# Patient Record
Sex: FEMALE | Race: WHITE | NOT HISPANIC OR LATINO | Employment: OTHER | URBAN - NONMETROPOLITAN AREA
[De-identification: names, ages, dates, MRNs, and addresses within clinical notes are randomized per-mention and may not be internally consistent; named-entity substitution may affect disease eponyms.]

---

## 2017-01-13 ENCOUNTER — HOSPITAL ENCOUNTER (EMERGENCY)
Facility: HOSPITAL | Age: 62
End: 2017-01-13
Attending: EMERGENCY MEDICINE
Payer: COMMERCIAL

## 2017-01-13 ENCOUNTER — APPOINTMENT (EMERGENCY)
Dept: RADIOLOGY | Facility: HOSPITAL | Age: 62
End: 2017-01-13
Payer: COMMERCIAL

## 2017-01-13 ENCOUNTER — APPOINTMENT (EMERGENCY)
Dept: RADIOLOGY | Facility: HOSPITAL | Age: 62
DRG: 956 | End: 2017-01-13
Payer: COMMERCIAL

## 2017-01-13 ENCOUNTER — HOSPITAL ENCOUNTER (INPATIENT)
Facility: HOSPITAL | Age: 62
LOS: 6 days | DRG: 956 | End: 2017-01-20
Attending: SURGERY | Admitting: SURGERY
Payer: COMMERCIAL

## 2017-01-13 VITALS
BODY MASS INDEX: 22.24 KG/M2 | SYSTOLIC BLOOD PRESSURE: 112 MMHG | HEART RATE: 95 BPM | OXYGEN SATURATION: 95 % | WEIGHT: 142 LBS | DIASTOLIC BLOOD PRESSURE: 62 MMHG | RESPIRATION RATE: 18 BRPM | TEMPERATURE: 97.5 F

## 2017-01-13 DIAGNOSIS — S72.142A: Primary | ICD-10-CM
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
## 2017-01-13 DIAGNOSIS — S72.142A INTERTROCHANTERIC FRACTURE OF LEFT FEMUR (HCC): Primary | ICD-10-CM

## 2017-01-13 LAB
ABO GROUP BLD: NORMAL
ABO GROUP BLD: NORMAL
ANION GAP SERPL CALCULATED.3IONS-SCNC: 14 MMOL/L (ref 4–13)
BASOPHILS # BLD AUTO: 0.02 THOUSANDS/ΜL (ref 0–0.1)
BASOPHILS NFR BLD AUTO: 0 % (ref 0–1)
BLD GP AB SCN SERPL QL: NEGATIVE
BLD GP AB SCN SERPL QL: NEGATIVE
BUN SERPL-MCNC: 8 MG/DL (ref 5–25)
CALCIUM SERPL-MCNC: 9.2 MG/DL (ref 8.3–10.1)
CHLORIDE SERPL-SCNC: 102 MMOL/L (ref 100–108)
CO2 SERPL-SCNC: 26 MMOL/L (ref 21–32)
CREAT SERPL-MCNC: 0.68 MG/DL (ref 0.6–1.3)
EOSINOPHIL # BLD AUTO: 0.01 THOUSAND/ΜL (ref 0–0.61)
EOSINOPHIL NFR BLD AUTO: 0 % (ref 0–6)
ERYTHROCYTE [DISTWIDTH] IN BLOOD BY AUTOMATED COUNT: 16.4 % (ref 11.6–15.1)
GFR SERPL CREATININE-BSD FRML MDRD: >60 ML/MIN/1.73SQ M
GLUCOSE SERPL-MCNC: 103 MG/DL (ref 65–140)
HCT VFR BLD AUTO: 46.4 % (ref 34.8–46.1)
HGB BLD-MCNC: 15.1 G/DL (ref 11.5–15.4)
INR PPP: 3.47 (ref 0.86–1.16)
LYMPHOCYTES # BLD AUTO: 1.92 THOUSANDS/ΜL (ref 0.6–4.47)
LYMPHOCYTES NFR BLD AUTO: 20 % (ref 14–44)
MCH RBC QN AUTO: 27.3 PG (ref 26.8–34.3)
MCHC RBC AUTO-ENTMCNC: 32.5 G/DL (ref 31.4–37.4)
MCV RBC AUTO: 84 FL (ref 82–98)
MONOCYTES # BLD AUTO: 0.5 THOUSAND/ΜL (ref 0.17–1.22)
MONOCYTES NFR BLD AUTO: 5 % (ref 4–12)
NEUTROPHILS # BLD AUTO: 7.08 THOUSANDS/ΜL (ref 1.85–7.62)
NEUTS SEG NFR BLD AUTO: 75 % (ref 43–75)
PLATELET # BLD AUTO: 297 THOUSANDS/UL (ref 149–390)
PMV BLD AUTO: 10.6 FL (ref 8.9–12.7)
POTASSIUM SERPL-SCNC: 3.5 MMOL/L (ref 3.5–5.3)
PROTHROMBIN TIME: 33.1 SECONDS (ref 12–14.3)
RBC # BLD AUTO: 5.53 MILLION/UL (ref 3.81–5.12)
RH BLD: POSITIVE
RH BLD: POSITIVE
SODIUM SERPL-SCNC: 142 MMOL/L (ref 136–145)
WBC # BLD AUTO: 9.53 THOUSAND/UL (ref 4.31–10.16)

## 2017-01-13 PROCEDURE — P9017 PLASMA 1 DONOR FRZ W/IN 8 HR: HCPCS

## 2017-01-13 PROCEDURE — 85025 COMPLETE CBC W/AUTO DIFF WBC: CPT | Performed by: EMERGENCY MEDICINE

## 2017-01-13 PROCEDURE — 86901 BLOOD TYPING SEROLOGIC RH(D): CPT | Performed by: ORTHOPAEDIC SURGERY

## 2017-01-13 PROCEDURE — 71010 HB CHEST X-RAY 1 VIEW FRONTAL: CPT

## 2017-01-13 PROCEDURE — 86923 COMPATIBILITY TEST ELECTRIC: CPT

## 2017-01-13 PROCEDURE — 86900 BLOOD TYPING SEROLOGIC ABO: CPT | Performed by: EMERGENCY MEDICINE

## 2017-01-13 PROCEDURE — 86850 RBC ANTIBODY SCREEN: CPT | Performed by: EMERGENCY MEDICINE

## 2017-01-13 PROCEDURE — 86850 RBC ANTIBODY SCREEN: CPT | Performed by: ORTHOPAEDIC SURGERY

## 2017-01-13 PROCEDURE — 99285 EMERGENCY DEPT VISIT HI MDM: CPT

## 2017-01-13 PROCEDURE — 85610 PROTHROMBIN TIME: CPT | Performed by: EMERGENCY MEDICINE

## 2017-01-13 PROCEDURE — 86927 PLASMA FRESH FROZEN: CPT

## 2017-01-13 PROCEDURE — 80048 BASIC METABOLIC PNL TOTAL CA: CPT | Performed by: EMERGENCY MEDICINE

## 2017-01-13 PROCEDURE — 73502 X-RAY EXAM HIP UNI 2-3 VIEWS: CPT

## 2017-01-13 PROCEDURE — 86900 BLOOD TYPING SEROLOGIC ABO: CPT | Performed by: ORTHOPAEDIC SURGERY

## 2017-01-13 PROCEDURE — 74177 CT ABD & PELVIS W/CONTRAST: CPT

## 2017-01-13 PROCEDURE — 72125 CT NECK SPINE W/O DYE: CPT

## 2017-01-13 PROCEDURE — 36415 COLL VENOUS BLD VENIPUNCTURE: CPT | Performed by: EMERGENCY MEDICINE

## 2017-01-13 PROCEDURE — 70450 CT HEAD/BRAIN W/O DYE: CPT

## 2017-01-13 PROCEDURE — 71260 CT THORAX DX C+: CPT

## 2017-01-13 PROCEDURE — 30233K1 TRANSFUSION OF NONAUTOLOGOUS FROZEN PLASMA INTO PERIPHERAL VEIN, PERCUTANEOUS APPROACH: ICD-10-PCS | Performed by: SURGERY

## 2017-01-13 PROCEDURE — 96374 THER/PROPH/DIAG INJ IV PUSH: CPT

## 2017-01-13 PROCEDURE — 73552 X-RAY EXAM OF FEMUR 2/>: CPT

## 2017-01-13 PROCEDURE — 86901 BLOOD TYPING SEROLOGIC RH(D): CPT | Performed by: EMERGENCY MEDICINE

## 2017-01-13 RX ORDER — OXYCODONE HYDROCHLORIDE 5 MG/1
5 TABLET ORAL EVERY 4 HOURS PRN
Status: DISCONTINUED | OUTPATIENT
Start: 2017-01-13 | End: 2017-01-20 | Stop reason: HOSPADM

## 2017-01-13 RX ORDER — MORPHINE SULFATE 2 MG/ML
2 INJECTION, SOLUTION INTRAMUSCULAR; INTRAVENOUS EVERY 4 HOURS PRN
Status: DISCONTINUED | OUTPATIENT
Start: 2017-01-13 | End: 2017-01-19

## 2017-01-13 RX ORDER — SENNOSIDES 8.6 MG
1 TABLET ORAL DAILY
Status: DISCONTINUED | OUTPATIENT
Start: 2017-01-14 | End: 2017-01-20 | Stop reason: HOSPADM

## 2017-01-13 RX ORDER — ALPRAZOLAM 0.25 MG/1
0.25 TABLET ORAL
Status: ON HOLD | COMMUNITY
End: 2017-01-31 | Stop reason: CLARIF

## 2017-01-13 RX ORDER — ATORVASTATIN CALCIUM 10 MG/1
10 TABLET, FILM COATED ORAL
COMMUNITY
End: 2017-01-31 | Stop reason: HOSPADM

## 2017-01-13 RX ORDER — MORPHINE SULFATE 10 MG/ML
6 INJECTION, SOLUTION INTRAMUSCULAR; INTRAVENOUS ONCE
Status: COMPLETED | OUTPATIENT
Start: 2017-01-13 | End: 2017-01-13

## 2017-01-13 RX ORDER — WARFARIN SODIUM 2.5 MG/1
2.5 TABLET ORAL
Status: ON HOLD | COMMUNITY
End: 2017-01-31

## 2017-01-13 RX ORDER — PHYTONADIONE 5 MG/1
10 TABLET ORAL ONCE
Status: COMPLETED | OUTPATIENT
Start: 2017-01-13 | End: 2017-01-13

## 2017-01-13 RX ORDER — DOCUSATE SODIUM 100 MG/1
100 CAPSULE, LIQUID FILLED ORAL 2 TIMES DAILY
Status: DISCONTINUED | OUTPATIENT
Start: 2017-01-13 | End: 2017-01-20 | Stop reason: HOSPADM

## 2017-01-13 RX ORDER — OXYCODONE AND ACETAMINOPHEN 10; 325 MG/1; MG/1
1 TABLET ORAL EVERY 4 HOURS PRN
Status: ON HOLD | COMMUNITY
End: 2017-01-20 | Stop reason: CLARIF

## 2017-01-13 RX ORDER — SODIUM CHLORIDE, SODIUM LACTATE, POTASSIUM CHLORIDE, CALCIUM CHLORIDE 600; 310; 30; 20 MG/100ML; MG/100ML; MG/100ML; MG/100ML
75 INJECTION, SOLUTION INTRAVENOUS CONTINUOUS
Status: DISCONTINUED | OUTPATIENT
Start: 2017-01-14 | End: 2017-01-15

## 2017-01-13 RX ORDER — OXYCODONE HYDROCHLORIDE 5 MG/1
10 TABLET ORAL EVERY 4 HOURS PRN
Status: DISCONTINUED | OUTPATIENT
Start: 2017-01-13 | End: 2017-01-20 | Stop reason: HOSPADM

## 2017-01-13 RX ORDER — SODIUM CHLORIDE 9 MG/ML
125 INJECTION, SOLUTION INTRAVENOUS CONTINUOUS
Status: DISCONTINUED | OUTPATIENT
Start: 2017-01-13 | End: 2017-01-13

## 2017-01-13 RX ORDER — ESCITALOPRAM OXALATE 10 MG/1
10 TABLET ORAL DAILY
COMMUNITY
End: 2017-01-31 | Stop reason: HOSPADM

## 2017-01-13 RX ORDER — ONDANSETRON 2 MG/ML
4 INJECTION INTRAMUSCULAR; INTRAVENOUS EVERY 6 HOURS PRN
Status: DISCONTINUED | OUTPATIENT
Start: 2017-01-13 | End: 2017-01-20 | Stop reason: HOSPADM

## 2017-01-13 RX ORDER — POTASSIUM CHLORIDE 20 MEQ/1
20 TABLET, EXTENDED RELEASE ORAL DAILY
Status: ON HOLD | COMMUNITY
End: 2017-01-31

## 2017-01-13 RX ADMIN — IOHEXOL 100 ML: 350 INJECTION, SOLUTION INTRAVENOUS at 21:05

## 2017-01-13 RX ADMIN — MORPHINE SULFATE 6 MG: 10 INJECTION, SOLUTION INTRAMUSCULAR; INTRAVENOUS at 17:33

## 2017-01-13 RX ADMIN — PHYTONADIONE 10 MG: 5 TABLET ORAL at 22:34

## 2017-01-13 RX ADMIN — DOCUSATE SODIUM 100 MG: 100 CAPSULE, LIQUID FILLED ORAL at 21:57

## 2017-01-13 RX ADMIN — OXYCODONE HYDROCHLORIDE 10 MG: 5 TABLET ORAL at 21:57

## 2017-01-14 ENCOUNTER — ANESTHESIA EVENT (OUTPATIENT)
Dept: PERIOP | Facility: HOSPITAL | Age: 62
DRG: 956 | End: 2017-01-14
Payer: COMMERCIAL

## 2017-01-14 ENCOUNTER — ANESTHESIA (OUTPATIENT)
Dept: PERIOP | Facility: HOSPITAL | Age: 62
DRG: 956 | End: 2017-01-14
Payer: COMMERCIAL

## 2017-01-14 ENCOUNTER — APPOINTMENT (OUTPATIENT)
Dept: RADIOLOGY | Facility: HOSPITAL | Age: 62
DRG: 956 | End: 2017-01-14
Payer: COMMERCIAL

## 2017-01-14 PROBLEM — Z04.1 ENCOUNTER FOR EXAMINATION FOLLOWING MOTOR VEHICLE COLLISION (MVC): Status: ACTIVE | Noted: 2017-01-14

## 2017-01-14 PROBLEM — S27.321A LEFT PULMONARY CONTUSION: Status: ACTIVE | Noted: 2017-01-14

## 2017-01-14 PROBLEM — R47.01 EXPRESSIVE APHASIA: Status: ACTIVE | Noted: 2017-01-14

## 2017-01-14 PROBLEM — S72.142A INTERTROCHANTERIC FRACTURE OF LEFT FEMUR (HCC): Status: ACTIVE | Noted: 2017-01-14

## 2017-01-14 PROBLEM — R79.1 ELEVATED INTERNATIONAL NORMALIZED RATIO (INR): Status: ACTIVE | Noted: 2017-01-14

## 2017-01-14 PROBLEM — T14.90XA INJURY: Status: RESOLVED | Noted: 2017-01-14 | Resolved: 2017-01-14

## 2017-01-14 PROBLEM — Z86.73 H/O ISCHEMIC LEFT MCA STROKE: Status: ACTIVE | Noted: 2017-01-14

## 2017-01-14 PROBLEM — T14.90XA INJURY: Status: ACTIVE | Noted: 2017-01-14

## 2017-01-14 LAB
ABO GROUP BLD BPU: NORMAL
ABO GROUP BLD BPU: NORMAL
ANION GAP SERPL CALCULATED.3IONS-SCNC: 12 MMOL/L (ref 4–13)
BASOPHILS # BLD AUTO: 0.02 THOUSANDS/ΜL (ref 0–0.1)
BASOPHILS NFR BLD AUTO: 0 % (ref 0–1)
BPU ID: NORMAL
BPU ID: NORMAL
BUN SERPL-MCNC: 8 MG/DL (ref 5–25)
CALCIUM SERPL-MCNC: 8.9 MG/DL (ref 8.3–10.1)
CHLORIDE SERPL-SCNC: 102 MMOL/L (ref 100–108)
CO2 SERPL-SCNC: 24 MMOL/L (ref 21–32)
CREAT SERPL-MCNC: 0.55 MG/DL (ref 0.6–1.3)
EOSINOPHIL # BLD AUTO: 0 THOUSAND/ΜL (ref 0–0.61)
EOSINOPHIL NFR BLD AUTO: 0 % (ref 0–6)
ERYTHROCYTE [DISTWIDTH] IN BLOOD BY AUTOMATED COUNT: 16.6 % (ref 11.6–15.1)
ERYTHROCYTE [DISTWIDTH] IN BLOOD BY AUTOMATED COUNT: 16.6 % (ref 11.6–15.1)
GFR SERPL CREATININE-BSD FRML MDRD: >60 ML/MIN/1.73SQ M
GLUCOSE SERPL-MCNC: 96 MG/DL (ref 65–140)
HCT VFR BLD AUTO: 30.4 % (ref 34.8–46.1)
HCT VFR BLD AUTO: 35.9 % (ref 34.8–46.1)
HGB BLD-MCNC: 10 G/DL (ref 11.5–15.4)
HGB BLD-MCNC: 12 G/DL (ref 11.5–15.4)
INR PPP: 1.52 (ref 0.86–1.16)
LYMPHOCYTES # BLD AUTO: 2 THOUSANDS/ΜL (ref 0.6–4.47)
LYMPHOCYTES NFR BLD AUTO: 19 % (ref 14–44)
MCH RBC QN AUTO: 27.6 PG (ref 26.8–34.3)
MCH RBC QN AUTO: 27.7 PG (ref 26.8–34.3)
MCHC RBC AUTO-ENTMCNC: 32.9 G/DL (ref 31.4–37.4)
MCHC RBC AUTO-ENTMCNC: 33.4 G/DL (ref 31.4–37.4)
MCV RBC AUTO: 83 FL (ref 82–98)
MCV RBC AUTO: 84 FL (ref 82–98)
MONOCYTES # BLD AUTO: 0.68 THOUSAND/ΜL (ref 0.17–1.22)
MONOCYTES NFR BLD AUTO: 7 % (ref 4–12)
NEUTROPHILS # BLD AUTO: 7.66 THOUSANDS/ΜL (ref 1.85–7.62)
NEUTS SEG NFR BLD AUTO: 74 % (ref 43–75)
NRBC BLD AUTO-RTO: 0 /100 WBCS
PLATELET # BLD AUTO: 256 THOUSANDS/UL (ref 149–390)
PLATELET # BLD AUTO: 258 THOUSANDS/UL (ref 149–390)
PMV BLD AUTO: 10 FL (ref 8.9–12.7)
PMV BLD AUTO: 10.2 FL (ref 8.9–12.7)
POTASSIUM SERPL-SCNC: 3.4 MMOL/L (ref 3.5–5.3)
PROTHROMBIN TIME: 18.3 SECONDS (ref 12–14.3)
RBC # BLD AUTO: 3.62 MILLION/UL (ref 3.81–5.12)
RBC # BLD AUTO: 4.33 MILLION/UL (ref 3.81–5.12)
SODIUM SERPL-SCNC: 138 MMOL/L (ref 136–145)
UNIT DISPENSE STATUS: NORMAL
UNIT DISPENSE STATUS: NORMAL
UNIT PRODUCT CODE: NORMAL
UNIT PRODUCT CODE: NORMAL
UNIT RH: NORMAL
UNIT RH: NORMAL
WBC # BLD AUTO: 10.38 THOUSAND/UL (ref 4.31–10.16)
WBC # BLD AUTO: 8.84 THOUSAND/UL (ref 4.31–10.16)

## 2017-01-14 PROCEDURE — C1769 GUIDE WIRE: HCPCS | Performed by: ORTHOPAEDIC SURGERY

## 2017-01-14 PROCEDURE — C1713 ANCHOR/SCREW BN/BN,TIS/BN: HCPCS | Performed by: ORTHOPAEDIC SURGERY

## 2017-01-14 PROCEDURE — 80048 BASIC METABOLIC PNL TOTAL CA: CPT | Performed by: EMERGENCY MEDICINE

## 2017-01-14 PROCEDURE — P9017 PLASMA 1 DONOR FRZ W/IN 8 HR: HCPCS

## 2017-01-14 PROCEDURE — 73502 X-RAY EXAM HIP UNI 2-3 VIEWS: CPT

## 2017-01-14 PROCEDURE — 85610 PROTHROMBIN TIME: CPT | Performed by: ORTHOPAEDIC SURGERY

## 2017-01-14 PROCEDURE — 85025 COMPLETE CBC W/AUTO DIFF WBC: CPT | Performed by: NURSE PRACTITIONER

## 2017-01-14 PROCEDURE — 86927 PLASMA FRESH FROZEN: CPT

## 2017-01-14 PROCEDURE — 85027 COMPLETE CBC AUTOMATED: CPT | Performed by: ORTHOPAEDIC SURGERY

## 2017-01-14 PROCEDURE — 0QH736Z INSERTION OF INTRAMEDULLARY INTERNAL FIXATION DEVICE INTO LEFT UPPER FEMUR, PERCUTANEOUS APPROACH: ICD-10-PCS | Performed by: ORTHOPAEDIC SURGERY

## 2017-01-14 DEVICE — 11.0MM TI HELICAL BLADE 105MM-STERILE: Type: IMPLANTABLE DEVICE | Status: FUNCTIONAL

## 2017-01-14 DEVICE — 10MM/130 DEG TI CANN TROCH FIXATION NAIL 170MM-STERILE: Type: IMPLANTABLE DEVICE | Status: FUNCTIONAL

## 2017-01-14 DEVICE — 5.0MM TI LOCKING SCREW W/T25 STARDRIVE 40MM F/IM NAIL-STER: Type: IMPLANTABLE DEVICE | Status: FUNCTIONAL

## 2017-01-14 RX ORDER — ONDANSETRON 2 MG/ML
INJECTION INTRAMUSCULAR; INTRAVENOUS AS NEEDED
Status: DISCONTINUED | OUTPATIENT
Start: 2017-01-14 | End: 2017-01-14 | Stop reason: SURG

## 2017-01-14 RX ORDER — GLYCOPYRROLATE 0.2 MG/ML
INJECTION INTRAMUSCULAR; INTRAVENOUS AS NEEDED
Status: DISCONTINUED | OUTPATIENT
Start: 2017-01-14 | End: 2017-01-14 | Stop reason: SURG

## 2017-01-14 RX ORDER — FENTANYL CITRATE 50 UG/ML
INJECTION, SOLUTION INTRAMUSCULAR; INTRAVENOUS AS NEEDED
Status: DISCONTINUED | OUTPATIENT
Start: 2017-01-14 | End: 2017-01-14 | Stop reason: SURG

## 2017-01-14 RX ORDER — SODIUM CHLORIDE, SODIUM LACTATE, POTASSIUM CHLORIDE, CALCIUM CHLORIDE 600; 310; 30; 20 MG/100ML; MG/100ML; MG/100ML; MG/100ML
75 INJECTION, SOLUTION INTRAVENOUS ONCE
Status: DISCONTINUED | OUTPATIENT
Start: 2017-01-14 | End: 2017-01-14 | Stop reason: SDUPTHER

## 2017-01-14 RX ORDER — ESMOLOL HYDROCHLORIDE 10 MG/ML
INJECTION INTRAVENOUS AS NEEDED
Status: DISCONTINUED | OUTPATIENT
Start: 2017-01-14 | End: 2017-01-14 | Stop reason: SURG

## 2017-01-14 RX ORDER — SODIUM CHLORIDE 9 MG/ML
INJECTION, SOLUTION INTRAVENOUS CONTINUOUS PRN
Status: DISCONTINUED | OUTPATIENT
Start: 2017-01-14 | End: 2017-01-14 | Stop reason: SURG

## 2017-01-14 RX ORDER — FENTANYL CITRATE/PF 50 MCG/ML
25 SYRINGE (ML) INJECTION
Status: DISCONTINUED | OUTPATIENT
Start: 2017-01-14 | End: 2017-01-14 | Stop reason: HOSPADM

## 2017-01-14 RX ORDER — OXYCODONE HYDROCHLORIDE 5 MG/1
5 TABLET ORAL EVERY 4 HOURS PRN
Qty: 30 TABLET | Refills: 0 | Status: ON HOLD | OUTPATIENT
Start: 2017-01-14 | End: 2017-01-20 | Stop reason: CLARIF

## 2017-01-14 RX ORDER — POTASSIUM CHLORIDE 14.9 MG/ML
20 INJECTION INTRAVENOUS ONCE
Status: COMPLETED | OUTPATIENT
Start: 2017-01-14 | End: 2017-01-14

## 2017-01-14 RX ADMIN — ESMOLOL HYDROCHLORIDE 10 MG: 100 INJECTION, SOLUTION INTRAVENOUS at 10:31

## 2017-01-14 RX ADMIN — ONDANSETRON 4 MG: 2 INJECTION INTRAMUSCULAR; INTRAVENOUS at 11:35

## 2017-01-14 RX ADMIN — SODIUM CHLORIDE: 0.9 INJECTION, SOLUTION INTRAVENOUS at 10:09

## 2017-01-14 RX ADMIN — SODIUM CHLORIDE, SODIUM LACTATE, POTASSIUM CHLORIDE, AND CALCIUM CHLORIDE 75 ML/HR: .6; .31; .03; .02 INJECTION, SOLUTION INTRAVENOUS at 02:56

## 2017-01-14 RX ADMIN — SODIUM CHLORIDE, SODIUM LACTATE, POTASSIUM CHLORIDE, AND CALCIUM CHLORIDE 75 ML/HR: .6; .31; .03; .02 INJECTION, SOLUTION INTRAVENOUS at 17:23

## 2017-01-14 RX ADMIN — CEFAZOLIN SODIUM 2000 MG: 2 SOLUTION INTRAVENOUS at 17:23

## 2017-01-14 RX ADMIN — GLYCOPYRROLATE 0.8 MG: 0.2 INJECTION INTRAMUSCULAR; INTRAVENOUS at 11:38

## 2017-01-14 RX ADMIN — FENTANYL CITRATE 25 MCG: 50 INJECTION, SOLUTION INTRAMUSCULAR; INTRAVENOUS at 10:52

## 2017-01-14 RX ADMIN — POTASSIUM CHLORIDE 20 MEQ: 200 INJECTION, SOLUTION INTRAVENOUS at 08:14

## 2017-01-14 RX ADMIN — SODIUM CHLORIDE, SODIUM LACTATE, POTASSIUM CHLORIDE, AND CALCIUM CHLORIDE: .6; .31; .03; .02 INJECTION, SOLUTION INTRAVENOUS at 10:09

## 2017-01-14 RX ADMIN — CEFAZOLIN SODIUM 2000 MG: 2 SOLUTION INTRAVENOUS at 10:31

## 2017-01-14 RX ADMIN — METOPROLOL TARTRATE 1 MG: 5 INJECTION, SOLUTION INTRAVENOUS at 10:31

## 2017-01-14 RX ADMIN — OXYCODONE HYDROCHLORIDE 5 MG: 5 TABLET ORAL at 21:11

## 2017-01-14 RX ADMIN — MORPHINE SULFATE 2 MG: 2 INJECTION, SOLUTION INTRAMUSCULAR; INTRAVENOUS at 05:29

## 2017-01-14 RX ADMIN — OXYCODONE HYDROCHLORIDE 10 MG: 5 TABLET ORAL at 04:35

## 2017-01-14 RX ADMIN — NEOSTIGMINE METHYLSULFATE 4 MG: 1 INJECTION INTRAMUSCULAR; INTRAVENOUS; SUBCUTANEOUS at 11:38

## 2017-01-15 LAB
ANION GAP SERPL CALCULATED.3IONS-SCNC: 8 MMOL/L (ref 4–13)
ANION GAP SERPL CALCULATED.3IONS-SCNC: 9 MMOL/L (ref 4–13)
BASOPHILS # BLD AUTO: 0.04 THOUSANDS/ΜL (ref 0–0.1)
BASOPHILS NFR BLD AUTO: 0 % (ref 0–1)
BUN SERPL-MCNC: 7 MG/DL (ref 5–25)
BUN SERPL-MCNC: 8 MG/DL (ref 5–25)
CALCIUM SERPL-MCNC: 7.8 MG/DL (ref 8.3–10.1)
CALCIUM SERPL-MCNC: 8.1 MG/DL (ref 8.3–10.1)
CHLORIDE SERPL-SCNC: 102 MMOL/L (ref 100–108)
CHLORIDE SERPL-SCNC: 99 MMOL/L (ref 100–108)
CO2 SERPL-SCNC: 28 MMOL/L (ref 21–32)
CO2 SERPL-SCNC: 30 MMOL/L (ref 21–32)
CREAT SERPL-MCNC: 0.6 MG/DL (ref 0.6–1.3)
CREAT SERPL-MCNC: 0.61 MG/DL (ref 0.6–1.3)
EOSINOPHIL # BLD AUTO: 0.04 THOUSAND/ΜL (ref 0–0.61)
EOSINOPHIL NFR BLD AUTO: 0 % (ref 0–6)
ERYTHROCYTE [DISTWIDTH] IN BLOOD BY AUTOMATED COUNT: 16.3 % (ref 11.6–15.1)
ERYTHROCYTE [DISTWIDTH] IN BLOOD BY AUTOMATED COUNT: 16.5 % (ref 11.6–15.1)
GFR SERPL CREATININE-BSD FRML MDRD: >60 ML/MIN/1.73SQ M
GFR SERPL CREATININE-BSD FRML MDRD: >60 ML/MIN/1.73SQ M
GLUCOSE SERPL-MCNC: 115 MG/DL (ref 65–140)
GLUCOSE SERPL-MCNC: 118 MG/DL (ref 65–140)
HCT VFR BLD AUTO: 23.3 % (ref 34.8–46.1)
HCT VFR BLD AUTO: 28.1 % (ref 34.8–46.1)
HGB BLD-MCNC: 7.7 G/DL (ref 11.5–15.4)
HGB BLD-MCNC: 9.3 G/DL (ref 11.5–15.4)
INR PPP: 1.1 (ref 0.86–1.16)
LYMPHOCYTES # BLD AUTO: 3.82 THOUSANDS/ΜL (ref 0.6–4.47)
LYMPHOCYTES NFR BLD AUTO: 31 % (ref 14–44)
MCH RBC QN AUTO: 27.8 PG (ref 26.8–34.3)
MCH RBC QN AUTO: 27.8 PG (ref 26.8–34.3)
MCHC RBC AUTO-ENTMCNC: 33 G/DL (ref 31.4–37.4)
MCHC RBC AUTO-ENTMCNC: 33.1 G/DL (ref 31.4–37.4)
MCV RBC AUTO: 84 FL (ref 82–98)
MCV RBC AUTO: 84 FL (ref 82–98)
MONOCYTES # BLD AUTO: 1.11 THOUSAND/ΜL (ref 0.17–1.22)
MONOCYTES NFR BLD AUTO: 9 % (ref 4–12)
NEUTROPHILS # BLD AUTO: 7.17 THOUSANDS/ΜL (ref 1.85–7.62)
NEUTS SEG NFR BLD AUTO: 60 % (ref 43–75)
NRBC BLD AUTO-RTO: 0 /100 WBCS
PLATELET # BLD AUTO: 225 THOUSANDS/UL (ref 149–390)
PLATELET # BLD AUTO: 254 THOUSANDS/UL (ref 149–390)
PMV BLD AUTO: 10 FL (ref 8.9–12.7)
PMV BLD AUTO: 10.8 FL (ref 8.9–12.7)
POTASSIUM SERPL-SCNC: 3.2 MMOL/L (ref 3.5–5.3)
POTASSIUM SERPL-SCNC: 3.4 MMOL/L (ref 3.5–5.3)
PROTHROMBIN TIME: 14.3 SECONDS (ref 12–14.3)
RBC # BLD AUTO: 2.77 MILLION/UL (ref 3.81–5.12)
RBC # BLD AUTO: 3.35 MILLION/UL (ref 3.81–5.12)
SODIUM SERPL-SCNC: 137 MMOL/L (ref 136–145)
SODIUM SERPL-SCNC: 139 MMOL/L (ref 136–145)
WBC # BLD AUTO: 12.23 THOUSAND/UL (ref 4.31–10.16)
WBC # BLD AUTO: 9.74 THOUSAND/UL (ref 4.31–10.16)

## 2017-01-15 PROCEDURE — G8988 SELF CARE GOAL STATUS: HCPCS

## 2017-01-15 PROCEDURE — 30233N1 TRANSFUSION OF NONAUTOLOGOUS RED BLOOD CELLS INTO PERIPHERAL VEIN, PERCUTANEOUS APPROACH: ICD-10-PCS | Performed by: SURGERY

## 2017-01-15 PROCEDURE — 85610 PROTHROMBIN TIME: CPT | Performed by: EMERGENCY MEDICINE

## 2017-01-15 PROCEDURE — 85027 COMPLETE CBC AUTOMATED: CPT | Performed by: ORTHOPAEDIC SURGERY

## 2017-01-15 PROCEDURE — 97163 PT EVAL HIGH COMPLEX 45 MIN: CPT

## 2017-01-15 PROCEDURE — P9021 RED BLOOD CELLS UNIT: HCPCS

## 2017-01-15 PROCEDURE — 97167 OT EVAL HIGH COMPLEX 60 MIN: CPT

## 2017-01-15 PROCEDURE — 80048 BASIC METABOLIC PNL TOTAL CA: CPT | Performed by: EMERGENCY MEDICINE

## 2017-01-15 PROCEDURE — 80048 BASIC METABOLIC PNL TOTAL CA: CPT | Performed by: ORTHOPAEDIC SURGERY

## 2017-01-15 PROCEDURE — G8981 BODY POS CURRENT STATUS: HCPCS

## 2017-01-15 PROCEDURE — G8982 BODY POS GOAL STATUS: HCPCS

## 2017-01-15 PROCEDURE — 85025 COMPLETE CBC W/AUTO DIFF WBC: CPT | Performed by: EMERGENCY MEDICINE

## 2017-01-15 PROCEDURE — G8987 SELF CARE CURRENT STATUS: HCPCS

## 2017-01-15 RX ORDER — SODIUM CHLORIDE, SODIUM LACTATE, POTASSIUM CHLORIDE, CALCIUM CHLORIDE 600; 310; 30; 20 MG/100ML; MG/100ML; MG/100ML; MG/100ML
75 INJECTION, SOLUTION INTRAVENOUS CONTINUOUS
Status: DISCONTINUED | OUTPATIENT
Start: 2017-01-15 | End: 2017-01-17

## 2017-01-15 RX ORDER — POTASSIUM CHLORIDE 20 MEQ/1
40 TABLET, EXTENDED RELEASE ORAL ONCE
Status: COMPLETED | OUTPATIENT
Start: 2017-01-15 | End: 2017-01-15

## 2017-01-15 RX ORDER — SODIUM CHLORIDE, SODIUM LACTATE, POTASSIUM CHLORIDE, CALCIUM CHLORIDE 600; 310; 30; 20 MG/100ML; MG/100ML; MG/100ML; MG/100ML
75 INJECTION, SOLUTION INTRAVENOUS CONTINUOUS
Status: DISCONTINUED | OUTPATIENT
Start: 2017-01-15 | End: 2017-01-15

## 2017-01-15 RX ORDER — POTASSIUM CHLORIDE 14.9 MG/ML
20 INJECTION INTRAVENOUS ONCE
Status: COMPLETED | OUTPATIENT
Start: 2017-01-15 | End: 2017-01-15

## 2017-01-15 RX ADMIN — OXYCODONE HYDROCHLORIDE 10 MG: 5 TABLET ORAL at 12:54

## 2017-01-15 RX ADMIN — SODIUM CHLORIDE, SODIUM LACTATE, POTASSIUM CHLORIDE, AND CALCIUM CHLORIDE 1000 ML: .6; .31; .03; .02 INJECTION, SOLUTION INTRAVENOUS at 20:01

## 2017-01-15 RX ADMIN — POTASSIUM CHLORIDE 20 MEQ: 200 INJECTION, SOLUTION INTRAVENOUS at 10:08

## 2017-01-15 RX ADMIN — OXYCODONE HYDROCHLORIDE 10 MG: 5 TABLET ORAL at 17:24

## 2017-01-15 RX ADMIN — SODIUM CHLORIDE, SODIUM LACTATE, POTASSIUM CHLORIDE, AND CALCIUM CHLORIDE 75 ML/HR: .6; .31; .03; .02 INJECTION, SOLUTION INTRAVENOUS at 03:53

## 2017-01-15 RX ADMIN — ONDANSETRON 4 MG: 2 INJECTION INTRAMUSCULAR; INTRAVENOUS at 10:13

## 2017-01-15 RX ADMIN — MORPHINE SULFATE 2 MG: 2 INJECTION, SOLUTION INTRAMUSCULAR; INTRAVENOUS at 09:45

## 2017-01-15 RX ADMIN — SODIUM CHLORIDE 1000 ML: 0.9 INJECTION, SOLUTION INTRAVENOUS at 07:54

## 2017-01-15 RX ADMIN — SENNOSIDES 8.6 MG: 8.6 TABLET, FILM COATED ORAL at 09:42

## 2017-01-15 RX ADMIN — CEFAZOLIN SODIUM 2000 MG: 2 SOLUTION INTRAVENOUS at 01:14

## 2017-01-15 RX ADMIN — DOCUSATE SODIUM 100 MG: 100 CAPSULE, LIQUID FILLED ORAL at 09:42

## 2017-01-15 RX ADMIN — POTASSIUM CHLORIDE 40 MEQ: 1500 TABLET, EXTENDED RELEASE ORAL at 13:13

## 2017-01-15 RX ADMIN — OXYCODONE HYDROCHLORIDE 10 MG: 5 TABLET ORAL at 21:48

## 2017-01-15 RX ADMIN — OXYCODONE HYDROCHLORIDE 5 MG: 5 TABLET ORAL at 06:33

## 2017-01-15 RX ADMIN — MORPHINE SULFATE 2 MG: 2 INJECTION, SOLUTION INTRAMUSCULAR; INTRAVENOUS at 14:38

## 2017-01-15 RX ADMIN — POTASSIUM CHLORIDE 40 MEQ: 1500 TABLET, EXTENDED RELEASE ORAL at 09:42

## 2017-01-15 RX ADMIN — ENOXAPARIN SODIUM 40 MG: 40 INJECTION SUBCUTANEOUS at 09:44

## 2017-01-16 LAB
ABO GROUP BLD BPU: NORMAL
ANION GAP SERPL CALCULATED.3IONS-SCNC: 7 MMOL/L (ref 4–13)
BPU ID: NORMAL
BUN SERPL-MCNC: 8 MG/DL (ref 5–25)
CALCIUM SERPL-MCNC: 7.8 MG/DL (ref 8.3–10.1)
CHLORIDE SERPL-SCNC: 102 MMOL/L (ref 100–108)
CO2 SERPL-SCNC: 30 MMOL/L (ref 21–32)
CREAT SERPL-MCNC: 0.5 MG/DL (ref 0.6–1.3)
GFR SERPL CREATININE-BSD FRML MDRD: >60 ML/MIN/1.73SQ M
GLUCOSE SERPL-MCNC: 106 MG/DL (ref 65–140)
HCT VFR BLD AUTO: 21.8 % (ref 34.8–46.1)
HCT VFR BLD AUTO: 25.4 % (ref 34.8–46.1)
HGB BLD-MCNC: 7.3 G/DL (ref 11.5–15.4)
HGB BLD-MCNC: 8.5 G/DL (ref 11.5–15.4)
POTASSIUM SERPL-SCNC: 3.6 MMOL/L (ref 3.5–5.3)
SODIUM SERPL-SCNC: 139 MMOL/L (ref 136–145)
UNIT DISPENSE STATUS: NORMAL
UNIT PRODUCT CODE: NORMAL
UNIT RH: NORMAL

## 2017-01-16 PROCEDURE — 85018 HEMOGLOBIN: CPT | Performed by: EMERGENCY MEDICINE

## 2017-01-16 PROCEDURE — P9021 RED BLOOD CELLS UNIT: HCPCS

## 2017-01-16 PROCEDURE — 85014 HEMATOCRIT: CPT | Performed by: EMERGENCY MEDICINE

## 2017-01-16 PROCEDURE — 85027 COMPLETE CBC AUTOMATED: CPT | Performed by: SURGERY

## 2017-01-16 PROCEDURE — 97112 NEUROMUSCULAR REEDUCATION: CPT

## 2017-01-16 PROCEDURE — 85018 HEMOGLOBIN: CPT | Performed by: PHYSICIAN ASSISTANT

## 2017-01-16 PROCEDURE — 97530 THERAPEUTIC ACTIVITIES: CPT

## 2017-01-16 PROCEDURE — 80048 BASIC METABOLIC PNL TOTAL CA: CPT | Performed by: PHYSICIAN ASSISTANT

## 2017-01-16 PROCEDURE — 97535 SELF CARE MNGMENT TRAINING: CPT

## 2017-01-16 PROCEDURE — 85014 HEMATOCRIT: CPT | Performed by: PHYSICIAN ASSISTANT

## 2017-01-16 RX ORDER — METOCLOPRAMIDE HYDROCHLORIDE 5 MG/ML
5 INJECTION INTRAMUSCULAR; INTRAVENOUS EVERY 6 HOURS PRN
Status: DISCONTINUED | OUTPATIENT
Start: 2017-01-16 | End: 2017-01-20 | Stop reason: HOSPADM

## 2017-01-16 RX ORDER — ACETAMINOPHEN 325 MG/1
650 TABLET ORAL EVERY 6 HOURS PRN
Status: DISCONTINUED | OUTPATIENT
Start: 2017-01-16 | End: 2017-01-20

## 2017-01-16 RX ORDER — POTASSIUM CHLORIDE 20MEQ/15ML
40 LIQUID (ML) ORAL ONCE
Status: COMPLETED | OUTPATIENT
Start: 2017-01-16 | End: 2017-01-16

## 2017-01-16 RX ADMIN — SENNOSIDES 8.6 MG: 8.6 TABLET, FILM COATED ORAL at 09:20

## 2017-01-16 RX ADMIN — SODIUM CHLORIDE, SODIUM LACTATE, POTASSIUM CHLORIDE, AND CALCIUM CHLORIDE 125 ML/HR: .6; .31; .03; .02 INJECTION, SOLUTION INTRAVENOUS at 03:46

## 2017-01-16 RX ADMIN — ACETAMINOPHEN 650 MG: 325 TABLET, FILM COATED ORAL at 13:04

## 2017-01-16 RX ADMIN — METOCLOPRAMIDE 5 MG: 5 INJECTION, SOLUTION INTRAMUSCULAR; INTRAVENOUS at 13:39

## 2017-01-16 RX ADMIN — DOCUSATE SODIUM 100 MG: 100 CAPSULE, LIQUID FILLED ORAL at 17:35

## 2017-01-16 RX ADMIN — ENOXAPARIN SODIUM 40 MG: 40 INJECTION SUBCUTANEOUS at 09:21

## 2017-01-16 RX ADMIN — OXYCODONE HYDROCHLORIDE 10 MG: 5 TABLET ORAL at 06:45

## 2017-01-16 RX ADMIN — DOCUSATE SODIUM 100 MG: 100 CAPSULE, LIQUID FILLED ORAL at 09:20

## 2017-01-16 RX ADMIN — POTASSIUM CHLORIDE 40 MEQ: 20 SOLUTION ORAL at 19:34

## 2017-01-16 RX ADMIN — OXYCODONE HYDROCHLORIDE 10 MG: 5 TABLET ORAL at 19:34

## 2017-01-16 RX ADMIN — ONDANSETRON 4 MG: 2 INJECTION INTRAMUSCULAR; INTRAVENOUS at 13:04

## 2017-01-17 LAB
ABO GROUP BLD BPU: NORMAL
ABO GROUP BLD BPU: NORMAL
BPU ID: NORMAL
BPU ID: NORMAL
ERYTHROCYTE [DISTWIDTH] IN BLOOD BY AUTOMATED COUNT: 15.4 % (ref 11.6–15.1)
ERYTHROCYTE [DISTWIDTH] IN BLOOD BY AUTOMATED COUNT: 15.8 % (ref 11.6–15.1)
HCT VFR BLD AUTO: 24.3 % (ref 34.8–46.1)
HCT VFR BLD AUTO: 25.8 % (ref 34.8–46.1)
HGB BLD-MCNC: 8.3 G/DL (ref 11.5–15.4)
HGB BLD-MCNC: 8.8 G/DL (ref 11.5–15.4)
MCH RBC QN AUTO: 28.6 PG (ref 26.8–34.3)
MCH RBC QN AUTO: 28.6 PG (ref 26.8–34.3)
MCHC RBC AUTO-ENTMCNC: 34.1 G/DL (ref 31.4–37.4)
MCHC RBC AUTO-ENTMCNC: 34.2 G/DL (ref 31.4–37.4)
MCV RBC AUTO: 84 FL (ref 82–98)
MCV RBC AUTO: 84 FL (ref 82–98)
PLATELET # BLD AUTO: 212 THOUSANDS/UL (ref 149–390)
PLATELET # BLD AUTO: 230 THOUSANDS/UL (ref 149–390)
PMV BLD AUTO: 9.9 FL (ref 8.9–12.7)
PMV BLD AUTO: 9.9 FL (ref 8.9–12.7)
RBC # BLD AUTO: 2.9 MILLION/UL (ref 3.81–5.12)
RBC # BLD AUTO: 3.08 MILLION/UL (ref 3.81–5.12)
UNIT DISPENSE STATUS: NORMAL
UNIT DISPENSE STATUS: NORMAL
UNIT PRODUCT CODE: NORMAL
UNIT PRODUCT CODE: NORMAL
UNIT RH: NORMAL
UNIT RH: NORMAL
WBC # BLD AUTO: 10.63 THOUSAND/UL (ref 4.31–10.16)
WBC # BLD AUTO: 8.55 THOUSAND/UL (ref 4.31–10.16)

## 2017-01-17 PROCEDURE — 97530 THERAPEUTIC ACTIVITIES: CPT

## 2017-01-17 PROCEDURE — 85027 COMPLETE CBC AUTOMATED: CPT | Performed by: SURGERY

## 2017-01-17 PROCEDURE — 97535 SELF CARE MNGMENT TRAINING: CPT

## 2017-01-17 RX ORDER — POTASSIUM CHLORIDE 20 MEQ/1
40 TABLET, EXTENDED RELEASE ORAL ONCE
Status: COMPLETED | OUTPATIENT
Start: 2017-01-17 | End: 2017-01-17

## 2017-01-17 RX ORDER — POLYETHYLENE GLYCOL 3350 17 G/17G
17 POWDER, FOR SOLUTION ORAL DAILY
Status: DISCONTINUED | OUTPATIENT
Start: 2017-01-17 | End: 2017-01-18

## 2017-01-17 RX ADMIN — ENOXAPARIN SODIUM 40 MG: 40 INJECTION SUBCUTANEOUS at 10:30

## 2017-01-17 RX ADMIN — SENNOSIDES 8.6 MG: 8.6 TABLET, FILM COATED ORAL at 10:30

## 2017-01-17 RX ADMIN — OXYCODONE HYDROCHLORIDE 5 MG: 5 TABLET ORAL at 03:51

## 2017-01-17 RX ADMIN — DOCUSATE SODIUM 100 MG: 100 CAPSULE, LIQUID FILLED ORAL at 17:02

## 2017-01-17 RX ADMIN — METOCLOPRAMIDE 5 MG: 5 INJECTION, SOLUTION INTRAMUSCULAR; INTRAVENOUS at 17:40

## 2017-01-17 RX ADMIN — ONDANSETRON 4 MG: 2 INJECTION INTRAMUSCULAR; INTRAVENOUS at 12:04

## 2017-01-17 RX ADMIN — POLYETHYLENE GLYCOL 3350 17 G: 17 POWDER, FOR SOLUTION ORAL at 10:30

## 2017-01-17 RX ADMIN — SODIUM CHLORIDE, SODIUM LACTATE, POTASSIUM CHLORIDE, AND CALCIUM CHLORIDE 75 ML/HR: .6; .31; .03; .02 INJECTION, SOLUTION INTRAVENOUS at 02:12

## 2017-01-17 RX ADMIN — OXYCODONE HYDROCHLORIDE 10 MG: 5 TABLET ORAL at 10:31

## 2017-01-17 RX ADMIN — DOCUSATE SODIUM 100 MG: 100 CAPSULE, LIQUID FILLED ORAL at 10:30

## 2017-01-17 RX ADMIN — SODIUM CHLORIDE 500 ML: 0.9 INJECTION, SOLUTION INTRAVENOUS at 17:02

## 2017-01-17 RX ADMIN — POTASSIUM CHLORIDE 40 MEQ: 1500 TABLET, EXTENDED RELEASE ORAL at 10:30

## 2017-01-17 RX ADMIN — OXYCODONE HYDROCHLORIDE 10 MG: 5 TABLET ORAL at 15:47

## 2017-01-17 RX ADMIN — OXYCODONE HYDROCHLORIDE 10 MG: 5 TABLET ORAL at 20:51

## 2017-01-18 ENCOUNTER — APPOINTMENT (INPATIENT)
Dept: RADIOLOGY | Facility: HOSPITAL | Age: 62
DRG: 956 | End: 2017-01-18
Attending: SURGERY
Payer: COMMERCIAL

## 2017-01-18 ENCOUNTER — APPOINTMENT (INPATIENT)
Dept: RADIOLOGY | Facility: HOSPITAL | Age: 62
DRG: 956 | End: 2017-01-18
Payer: COMMERCIAL

## 2017-01-18 LAB
APTT PPP: 26 SECONDS (ref 24–36)
HCT VFR BLD AUTO: 24.2 % (ref 34.8–46.1)
HCT VFR BLD AUTO: 26.6 % (ref 34.8–46.1)
HGB BLD-MCNC: 8.2 G/DL (ref 11.5–15.4)
HGB BLD-MCNC: 8.9 G/DL (ref 11.5–15.4)
INR PPP: 0.96 (ref 0.86–1.16)
PROTHROMBIN TIME: 12.9 SECONDS (ref 12–14.3)
RETICS # AUTO: ABNORMAL 10*3/UL (ref 14097–95744)
RETICS # CALC: 1.89 % (ref 0.37–1.87)

## 2017-01-18 PROCEDURE — 71010 HB CHEST X-RAY 1 VIEW FRONTAL (PORTABLE): CPT

## 2017-01-18 PROCEDURE — 85045 AUTOMATED RETICULOCYTE COUNT: CPT | Performed by: PHYSICIAN ASSISTANT

## 2017-01-18 PROCEDURE — 97530 THERAPEUTIC ACTIVITIES: CPT

## 2017-01-18 PROCEDURE — 85610 PROTHROMBIN TIME: CPT | Performed by: PHYSICIAN ASSISTANT

## 2017-01-18 PROCEDURE — 97535 SELF CARE MNGMENT TRAINING: CPT

## 2017-01-18 PROCEDURE — 85018 HEMOGLOBIN: CPT | Performed by: PHYSICIAN ASSISTANT

## 2017-01-18 PROCEDURE — 97112 NEUROMUSCULAR REEDUCATION: CPT

## 2017-01-18 PROCEDURE — 85014 HEMATOCRIT: CPT | Performed by: EMERGENCY MEDICINE

## 2017-01-18 PROCEDURE — 85014 HEMATOCRIT: CPT | Performed by: PHYSICIAN ASSISTANT

## 2017-01-18 PROCEDURE — 93971 EXTREMITY STUDY: CPT

## 2017-01-18 PROCEDURE — 85018 HEMOGLOBIN: CPT | Performed by: EMERGENCY MEDICINE

## 2017-01-18 PROCEDURE — 85730 THROMBOPLASTIN TIME PARTIAL: CPT | Performed by: PHYSICIAN ASSISTANT

## 2017-01-18 RX ORDER — POLYETHYLENE GLYCOL 3350 17 G/17G
17 POWDER, FOR SOLUTION ORAL 2 TIMES DAILY
Status: DISCONTINUED | OUTPATIENT
Start: 2017-01-18 | End: 2017-01-20 | Stop reason: HOSPADM

## 2017-01-18 RX ORDER — POTASSIUM CHLORIDE 20 MEQ/1
20 TABLET, EXTENDED RELEASE ORAL DAILY
Status: DISCONTINUED | OUTPATIENT
Start: 2017-01-18 | End: 2017-01-20 | Stop reason: HOSPADM

## 2017-01-18 RX ADMIN — POTASSIUM CHLORIDE 20 MEQ: 1500 TABLET, EXTENDED RELEASE ORAL at 12:42

## 2017-01-18 RX ADMIN — POLYETHYLENE GLYCOL 3350 17 G: 17 POWDER, FOR SOLUTION ORAL at 09:01

## 2017-01-18 RX ADMIN — OXYCODONE HYDROCHLORIDE 10 MG: 5 TABLET ORAL at 09:00

## 2017-01-18 RX ADMIN — SENNOSIDES 8.6 MG: 8.6 TABLET, FILM COATED ORAL at 09:00

## 2017-01-18 RX ADMIN — OXYCODONE HYDROCHLORIDE 5 MG: 5 TABLET ORAL at 21:41

## 2017-01-18 RX ADMIN — ENOXAPARIN SODIUM 40 MG: 40 INJECTION SUBCUTANEOUS at 09:01

## 2017-01-18 RX ADMIN — POLYETHYLENE GLYCOL 3350 17 G: 17 POWDER, FOR SOLUTION ORAL at 19:29

## 2017-01-18 RX ADMIN — OXYCODONE HYDROCHLORIDE 5 MG: 5 TABLET ORAL at 16:07

## 2017-01-18 RX ADMIN — DOCUSATE SODIUM 100 MG: 100 CAPSULE, LIQUID FILLED ORAL at 09:00

## 2017-01-18 RX ADMIN — MORPHINE SULFATE 2 MG: 2 INJECTION, SOLUTION INTRAMUSCULAR; INTRAVENOUS at 11:30

## 2017-01-18 RX ADMIN — DOCUSATE SODIUM 100 MG: 100 CAPSULE, LIQUID FILLED ORAL at 19:29

## 2017-01-19 PROCEDURE — 97530 THERAPEUTIC ACTIVITIES: CPT

## 2017-01-19 PROCEDURE — 97535 SELF CARE MNGMENT TRAINING: CPT

## 2017-01-19 RX ORDER — BUTALBITAL, ACETAMINOPHEN AND CAFFEINE 50; 325; 40 MG/1; MG/1; MG/1
1 TABLET ORAL EVERY 6 HOURS PRN
Status: DISCONTINUED | OUTPATIENT
Start: 2017-01-19 | End: 2017-01-20 | Stop reason: HOSPADM

## 2017-01-19 RX ORDER — MAGNESIUM CARB/ALUMINUM HYDROX 105-160MG
296 TABLET,CHEWABLE ORAL ONCE
Status: DISCONTINUED | OUTPATIENT
Start: 2017-01-19 | End: 2017-01-19

## 2017-01-19 RX ORDER — WARFARIN SODIUM 2.5 MG/1
2.5 TABLET ORAL
Status: DISCONTINUED | OUTPATIENT
Start: 2017-01-19 | End: 2017-01-20 | Stop reason: HOSPADM

## 2017-01-19 RX ORDER — BISACODYL 10 MG
10 SUPPOSITORY, RECTAL RECTAL DAILY PRN
Status: DISCONTINUED | OUTPATIENT
Start: 2017-01-19 | End: 2017-01-20 | Stop reason: HOSPADM

## 2017-01-19 RX ADMIN — POLYETHYLENE GLYCOL 3350 17 G: 17 POWDER, FOR SOLUTION ORAL at 17:23

## 2017-01-19 RX ADMIN — OXYCODONE HYDROCHLORIDE 10 MG: 5 TABLET ORAL at 16:23

## 2017-01-19 RX ADMIN — ENOXAPARIN SODIUM 40 MG: 40 INJECTION SUBCUTANEOUS at 12:05

## 2017-01-19 RX ADMIN — MORPHINE SULFATE 2 MG: 2 INJECTION, SOLUTION INTRAMUSCULAR; INTRAVENOUS at 01:02

## 2017-01-19 RX ADMIN — ACETAMINOPHEN 650 MG: 325 TABLET, FILM COATED ORAL at 12:05

## 2017-01-19 RX ADMIN — OXYCODONE HYDROCHLORIDE 10 MG: 5 TABLET ORAL at 03:17

## 2017-01-19 RX ADMIN — DOCUSATE SODIUM 100 MG: 100 CAPSULE, LIQUID FILLED ORAL at 17:22

## 2017-01-19 RX ADMIN — SENNOSIDES 8.6 MG: 8.6 TABLET, FILM COATED ORAL at 12:06

## 2017-01-19 RX ADMIN — WARFARIN SODIUM 2.5 MG: 2.5 TABLET ORAL at 17:22

## 2017-01-19 RX ADMIN — DOCUSATE SODIUM 100 MG: 100 CAPSULE, LIQUID FILLED ORAL at 12:05

## 2017-01-19 RX ADMIN — BUTALBITAL, ACETAMINOPHEN, AND CAFFEINE 1 TABLET: 50; 325; 40 TABLET ORAL at 12:32

## 2017-01-19 RX ADMIN — BISACODYL 10 MG: 10 SUPPOSITORY RECTAL at 16:24

## 2017-01-19 RX ADMIN — POTASSIUM CHLORIDE 20 MEQ: 1500 TABLET, EXTENDED RELEASE ORAL at 12:07

## 2017-01-19 RX ADMIN — POLYETHYLENE GLYCOL 3350 17 G: 17 POWDER, FOR SOLUTION ORAL at 12:06

## 2017-01-20 ENCOUNTER — HOSPITAL ENCOUNTER (INPATIENT)
Facility: HOSPITAL | Age: 62
LOS: 11 days | Discharge: HOME HEALTH | DRG: 560 | End: 2017-01-31
Attending: PHYSICAL MEDICINE & REHABILITATION | Admitting: PHYSICAL MEDICINE & REHABILITATION
Payer: COMMERCIAL

## 2017-01-20 VITALS
RESPIRATION RATE: 18 BRPM | DIASTOLIC BLOOD PRESSURE: 72 MMHG | TEMPERATURE: 98.3 F | WEIGHT: 144.62 LBS | HEIGHT: 65 IN | OXYGEN SATURATION: 98 % | HEART RATE: 90 BPM | BODY MASS INDEX: 24.1 KG/M2 | SYSTOLIC BLOOD PRESSURE: 108 MMHG

## 2017-01-20 DIAGNOSIS — R68.89 OTHER ABNORMAL CLINICAL FINDING: ICD-10-CM

## 2017-01-20 DIAGNOSIS — S72.142A INTERTROCHANTERIC FRACTURE OF LEFT FEMUR (HCC): ICD-10-CM

## 2017-01-20 DIAGNOSIS — I63.9 CVA (CEREBRAL VASCULAR ACCIDENT) (HCC): ICD-10-CM

## 2017-01-20 DIAGNOSIS — D75.839 THROMBOCYTOSIS: ICD-10-CM

## 2017-01-20 DIAGNOSIS — E87.6 HYPOKALEMIA: Primary | ICD-10-CM

## 2017-01-20 LAB
ANION GAP SERPL CALCULATED.3IONS-SCNC: 8 MMOL/L (ref 4–13)
BUN SERPL-MCNC: 7 MG/DL (ref 5–25)
CALCIUM SERPL-MCNC: 9.7 MG/DL (ref 8.3–10.1)
CHLORIDE SERPL-SCNC: 100 MMOL/L (ref 100–108)
CO2 SERPL-SCNC: 32 MMOL/L (ref 21–32)
CREAT SERPL-MCNC: 0.65 MG/DL (ref 0.6–1.3)
GFR SERPL CREATININE-BSD FRML MDRD: >60 ML/MIN/1.73SQ M
GLUCOSE SERPL-MCNC: 96 MG/DL (ref 65–140)
POTASSIUM SERPL-SCNC: 3.7 MMOL/L (ref 3.5–5.3)
SODIUM SERPL-SCNC: 140 MMOL/L (ref 136–145)

## 2017-01-20 PROCEDURE — 97163 PT EVAL HIGH COMPLEX 45 MIN: CPT | Performed by: PHYSICAL THERAPIST

## 2017-01-20 PROCEDURE — 97530 THERAPEUTIC ACTIVITIES: CPT | Performed by: PHYSICAL THERAPIST

## 2017-01-20 PROCEDURE — 80048 BASIC METABOLIC PNL TOTAL CA: CPT | Performed by: PHYSICIAN ASSISTANT

## 2017-01-20 RX ORDER — WARFARIN SODIUM 2.5 MG/1
2.5 TABLET ORAL
Status: DISCONTINUED | OUTPATIENT
Start: 2017-01-20 | End: 2017-01-22

## 2017-01-20 RX ORDER — ACETAMINOPHEN 325 MG/1
975 TABLET ORAL EVERY 8 HOURS SCHEDULED
Status: DISCONTINUED | OUTPATIENT
Start: 2017-01-20 | End: 2017-01-31

## 2017-01-20 RX ORDER — BISACODYL 10 MG
10 SUPPOSITORY, RECTAL RECTAL DAILY PRN
Status: DISCONTINUED | OUTPATIENT
Start: 2017-01-20 | End: 2017-01-31

## 2017-01-20 RX ORDER — ALPRAZOLAM 0.25 MG/1
0.25 TABLET ORAL
Status: DISCONTINUED | OUTPATIENT
Start: 2017-01-20 | End: 2017-01-27

## 2017-01-20 RX ORDER — POLYETHYLENE GLYCOL 3350 17 G/17G
17 POWDER, FOR SOLUTION ORAL DAILY
Status: DISCONTINUED | OUTPATIENT
Start: 2017-01-20 | End: 2017-01-31

## 2017-01-20 RX ORDER — WARFARIN SODIUM 2.5 MG/1
2.5 TABLET ORAL
Status: CANCELLED | OUTPATIENT
Start: 2017-01-20

## 2017-01-20 RX ORDER — ACETAMINOPHEN 325 MG/1
650 TABLET ORAL EVERY 8 HOURS
Status: CANCELLED | OUTPATIENT
Start: 2017-01-20

## 2017-01-20 RX ORDER — BISACODYL 10 MG
10 SUPPOSITORY, RECTAL RECTAL DAILY PRN
Status: DISCONTINUED | OUTPATIENT
Start: 2017-01-20 | End: 2017-01-20

## 2017-01-20 RX ORDER — ESCITALOPRAM OXALATE 10 MG/1
10 TABLET ORAL DAILY
Status: DISCONTINUED | OUTPATIENT
Start: 2017-01-20 | End: 2017-01-20

## 2017-01-20 RX ORDER — OXYCODONE HYDROCHLORIDE 10 MG/1
10 TABLET ORAL EVERY 4 HOURS PRN
Status: DISCONTINUED | OUTPATIENT
Start: 2017-01-20 | End: 2017-01-31

## 2017-01-20 RX ORDER — OXYCODONE HYDROCHLORIDE 5 MG/1
10 TABLET ORAL EVERY 4 HOURS PRN
Status: CANCELLED | OUTPATIENT
Start: 2017-01-20

## 2017-01-20 RX ORDER — BUTALBITAL, ACETAMINOPHEN AND CAFFEINE 50; 325; 40 MG/1; MG/1; MG/1
1 TABLET ORAL EVERY 6 HOURS PRN
Status: CANCELLED | OUTPATIENT
Start: 2017-01-20

## 2017-01-20 RX ORDER — POTASSIUM CHLORIDE 20 MEQ/1
20 TABLET, EXTENDED RELEASE ORAL DAILY
Status: CANCELLED | OUTPATIENT
Start: 2017-01-21

## 2017-01-20 RX ORDER — SENNOSIDES 8.6 MG
1 TABLET ORAL DAILY
Status: DISCONTINUED | OUTPATIENT
Start: 2017-01-21 | End: 2017-01-31

## 2017-01-20 RX ORDER — BISACODYL 10 MG
10 SUPPOSITORY, RECTAL RECTAL DAILY PRN
Status: CANCELLED | OUTPATIENT
Start: 2017-01-20

## 2017-01-20 RX ORDER — DOCUSATE SODIUM 100 MG/1
100 CAPSULE, LIQUID FILLED ORAL 2 TIMES DAILY
Status: CANCELLED | OUTPATIENT
Start: 2017-01-20

## 2017-01-20 RX ORDER — POTASSIUM CHLORIDE 20 MEQ/1
20 TABLET, EXTENDED RELEASE ORAL DAILY
Status: DISCONTINUED | OUTPATIENT
Start: 2017-01-21 | End: 2017-01-31 | Stop reason: HOSPADM

## 2017-01-20 RX ORDER — OXYCODONE HYDROCHLORIDE 5 MG/1
5 TABLET ORAL EVERY 4 HOURS PRN
Status: CANCELLED | OUTPATIENT
Start: 2017-01-20

## 2017-01-20 RX ORDER — SENNOSIDES 8.6 MG
1 TABLET ORAL DAILY
Status: CANCELLED | OUTPATIENT
Start: 2017-01-21

## 2017-01-20 RX ORDER — ACETAMINOPHEN 325 MG/1
650 TABLET ORAL EVERY 8 HOURS
Status: DISCONTINUED | OUTPATIENT
Start: 2017-01-20 | End: 2017-01-20 | Stop reason: HOSPADM

## 2017-01-20 RX ORDER — ATORVASTATIN CALCIUM 10 MG/1
10 TABLET, FILM COATED ORAL
Status: DISCONTINUED | OUTPATIENT
Start: 2017-01-20 | End: 2017-01-26

## 2017-01-20 RX ORDER — OXYCODONE HYDROCHLORIDE 5 MG/1
5 TABLET ORAL EVERY 4 HOURS PRN
Status: DISCONTINUED | OUTPATIENT
Start: 2017-01-20 | End: 2017-01-31

## 2017-01-20 RX ADMIN — SENNOSIDES 8.6 MG: 8.6 TABLET, FILM COATED ORAL at 09:01

## 2017-01-20 RX ADMIN — ENOXAPARIN SODIUM 40 MG: 40 INJECTION SUBCUTANEOUS at 14:58

## 2017-01-20 RX ADMIN — ACETAMINOPHEN 975 MG: 325 TABLET, FILM COATED ORAL at 21:28

## 2017-01-20 RX ADMIN — WARFARIN SODIUM 2.5 MG: 2.5 TABLET ORAL at 17:14

## 2017-01-20 RX ADMIN — POTASSIUM CHLORIDE 20 MEQ: 1500 TABLET, EXTENDED RELEASE ORAL at 09:01

## 2017-01-20 RX ADMIN — ACETAMINOPHEN 650 MG: 325 TABLET, FILM COATED ORAL at 09:01

## 2017-01-20 RX ADMIN — ATORVASTATIN CALCIUM 10 MG: 10 TABLET, FILM COATED ORAL at 17:14

## 2017-01-21 PROCEDURE — 97530 THERAPEUTIC ACTIVITIES: CPT

## 2017-01-21 PROCEDURE — 97167 OT EVAL HIGH COMPLEX 60 MIN: CPT

## 2017-01-21 PROCEDURE — 97112 NEUROMUSCULAR REEDUCATION: CPT

## 2017-01-21 PROCEDURE — 97110 THERAPEUTIC EXERCISES: CPT

## 2017-01-21 PROCEDURE — 97535 SELF CARE MNGMENT TRAINING: CPT

## 2017-01-21 RX ADMIN — ATORVASTATIN CALCIUM 10 MG: 10 TABLET, FILM COATED ORAL at 17:03

## 2017-01-21 RX ADMIN — ALPRAZOLAM 0.25 MG: 0.25 TABLET ORAL at 02:09

## 2017-01-21 RX ADMIN — ENOXAPARIN SODIUM 70 MG: 80 INJECTION SUBCUTANEOUS at 21:26

## 2017-01-21 RX ADMIN — POTASSIUM CHLORIDE 20 MEQ: 1500 TABLET, EXTENDED RELEASE ORAL at 08:33

## 2017-01-21 RX ADMIN — ACETAMINOPHEN 975 MG: 325 TABLET, FILM COATED ORAL at 06:18

## 2017-01-21 RX ADMIN — OXYCODONE HYDROCHLORIDE 5 MG: 5 TABLET ORAL at 13:59

## 2017-01-21 RX ADMIN — ACETAMINOPHEN 975 MG: 325 TABLET, FILM COATED ORAL at 13:59

## 2017-01-21 RX ADMIN — ENOXAPARIN SODIUM 40 MG: 40 INJECTION SUBCUTANEOUS at 08:32

## 2017-01-21 RX ADMIN — WARFARIN SODIUM 2.5 MG: 2.5 TABLET ORAL at 17:03

## 2017-01-21 RX ADMIN — ACETAMINOPHEN 975 MG: 325 TABLET, FILM COATED ORAL at 21:23

## 2017-01-22 LAB
25(OH)D3 SERPL-MCNC: 40.2 NG/ML (ref 30–100)
ANION GAP SERPL CALCULATED.3IONS-SCNC: 6 MMOL/L (ref 4–13)
BUN SERPL-MCNC: 14 MG/DL (ref 5–25)
CALCIUM SERPL-MCNC: 9 MG/DL (ref 8.3–10.1)
CHLORIDE SERPL-SCNC: 105 MMOL/L (ref 100–108)
CO2 SERPL-SCNC: 28 MMOL/L (ref 21–32)
CREAT SERPL-MCNC: 0.49 MG/DL (ref 0.6–1.3)
ERYTHROCYTE [DISTWIDTH] IN BLOOD BY AUTOMATED COUNT: 17.5 % (ref 11.6–15.1)
GFR SERPL CREATININE-BSD FRML MDRD: >60 ML/MIN/1.73SQ M
GLUCOSE SERPL-MCNC: 92 MG/DL (ref 65–140)
HCT VFR BLD AUTO: 27.7 % (ref 34.8–46.1)
HGB BLD-MCNC: 8.9 G/DL (ref 11.5–15.4)
INR PPP: 0.97 (ref 0.86–1.16)
MCH RBC QN AUTO: 28.8 PG (ref 26.8–34.3)
MCHC RBC AUTO-ENTMCNC: 32.1 G/DL (ref 31.4–37.4)
MCV RBC AUTO: 90 FL (ref 82–98)
PLATELET # BLD AUTO: 472 THOUSANDS/UL (ref 149–390)
PMV BLD AUTO: 9.1 FL (ref 8.9–12.7)
POTASSIUM SERPL-SCNC: 4.1 MMOL/L (ref 3.5–5.3)
PROTHROMBIN TIME: 13 SECONDS (ref 12–14.3)
RBC # BLD AUTO: 3.09 MILLION/UL (ref 3.81–5.12)
SODIUM SERPL-SCNC: 139 MMOL/L (ref 136–145)
WBC # BLD AUTO: 7.79 THOUSAND/UL (ref 4.31–10.16)

## 2017-01-22 PROCEDURE — 97530 THERAPEUTIC ACTIVITIES: CPT | Performed by: PHYSICAL THERAPIST

## 2017-01-22 PROCEDURE — 80048 BASIC METABOLIC PNL TOTAL CA: CPT | Performed by: PHYSICAL MEDICINE & REHABILITATION

## 2017-01-22 PROCEDURE — 85610 PROTHROMBIN TIME: CPT | Performed by: PHYSICAL MEDICINE & REHABILITATION

## 2017-01-22 PROCEDURE — 97112 NEUROMUSCULAR REEDUCATION: CPT

## 2017-01-22 PROCEDURE — 97530 THERAPEUTIC ACTIVITIES: CPT

## 2017-01-22 PROCEDURE — 85027 COMPLETE CBC AUTOMATED: CPT | Performed by: PHYSICAL MEDICINE & REHABILITATION

## 2017-01-22 PROCEDURE — 82306 VITAMIN D 25 HYDROXY: CPT | Performed by: PHYSICAL MEDICINE & REHABILITATION

## 2017-01-22 PROCEDURE — 97110 THERAPEUTIC EXERCISES: CPT | Performed by: PHYSICAL THERAPIST

## 2017-01-22 RX ORDER — WARFARIN SODIUM 5 MG/1
5 TABLET ORAL
Status: DISCONTINUED | OUTPATIENT
Start: 2017-01-22 | End: 2017-01-28

## 2017-01-22 RX ADMIN — POTASSIUM CHLORIDE 20 MEQ: 1500 TABLET, EXTENDED RELEASE ORAL at 08:01

## 2017-01-22 RX ADMIN — ACETAMINOPHEN 975 MG: 325 TABLET, FILM COATED ORAL at 05:54

## 2017-01-22 RX ADMIN — ENOXAPARIN SODIUM 70 MG: 80 INJECTION SUBCUTANEOUS at 08:01

## 2017-01-22 RX ADMIN — ACETAMINOPHEN 975 MG: 325 TABLET, FILM COATED ORAL at 13:47

## 2017-01-22 RX ADMIN — ENOXAPARIN SODIUM 70 MG: 80 INJECTION SUBCUTANEOUS at 21:46

## 2017-01-22 RX ADMIN — WARFARIN SODIUM 5 MG: 5 TABLET ORAL at 18:00

## 2017-01-22 RX ADMIN — ATORVASTATIN CALCIUM 10 MG: 10 TABLET, FILM COATED ORAL at 18:00

## 2017-01-22 RX ADMIN — ACETAMINOPHEN 975 MG: 325 TABLET, FILM COATED ORAL at 21:46

## 2017-01-23 LAB
INR PPP: 1.03 (ref 0.86–1.16)
PROTHROMBIN TIME: 13.6 SECONDS (ref 12–14.3)

## 2017-01-23 PROCEDURE — 97110 THERAPEUTIC EXERCISES: CPT

## 2017-01-23 PROCEDURE — 85610 PROTHROMBIN TIME: CPT | Performed by: PHYSICAL MEDICINE & REHABILITATION

## 2017-01-23 PROCEDURE — 97110 THERAPEUTIC EXERCISES: CPT | Performed by: PHYSICAL THERAPIST

## 2017-01-23 PROCEDURE — 97530 THERAPEUTIC ACTIVITIES: CPT

## 2017-01-23 PROCEDURE — 97530 THERAPEUTIC ACTIVITIES: CPT | Performed by: PHYSICAL THERAPIST

## 2017-01-23 PROCEDURE — 97112 NEUROMUSCULAR REEDUCATION: CPT

## 2017-01-23 RX ADMIN — ENOXAPARIN SODIUM 70 MG: 80 INJECTION SUBCUTANEOUS at 09:33

## 2017-01-23 RX ADMIN — OXYCODONE HYDROCHLORIDE 5 MG: 5 TABLET ORAL at 20:15

## 2017-01-23 RX ADMIN — ENOXAPARIN SODIUM 70 MG: 80 INJECTION SUBCUTANEOUS at 21:44

## 2017-01-23 RX ADMIN — ACETAMINOPHEN 975 MG: 325 TABLET, FILM COATED ORAL at 21:44

## 2017-01-23 RX ADMIN — ACETAMINOPHEN 975 MG: 325 TABLET, FILM COATED ORAL at 06:05

## 2017-01-23 RX ADMIN — ATORVASTATIN CALCIUM 10 MG: 10 TABLET, FILM COATED ORAL at 15:48

## 2017-01-23 RX ADMIN — POTASSIUM CHLORIDE 20 MEQ: 1500 TABLET, EXTENDED RELEASE ORAL at 09:34

## 2017-01-23 RX ADMIN — WARFARIN SODIUM 5 MG: 5 TABLET ORAL at 18:06

## 2017-01-23 RX ADMIN — ACETAMINOPHEN 975 MG: 325 TABLET, FILM COATED ORAL at 14:10

## 2017-01-24 LAB
INR PPP: 1.13 (ref 0.86–1.16)
PROTHROMBIN TIME: 14.6 SECONDS (ref 12–14.3)

## 2017-01-24 PROCEDURE — 85610 PROTHROMBIN TIME: CPT | Performed by: PHYSICAL MEDICINE & REHABILITATION

## 2017-01-24 PROCEDURE — 97110 THERAPEUTIC EXERCISES: CPT | Performed by: PHYSICAL THERAPIST

## 2017-01-24 PROCEDURE — 97530 THERAPEUTIC ACTIVITIES: CPT | Performed by: PHYSICAL THERAPIST

## 2017-01-24 PROCEDURE — 97530 THERAPEUTIC ACTIVITIES: CPT

## 2017-01-24 PROCEDURE — 97535 SELF CARE MNGMENT TRAINING: CPT

## 2017-01-24 PROCEDURE — 97110 THERAPEUTIC EXERCISES: CPT

## 2017-01-24 PROCEDURE — 97112 NEUROMUSCULAR REEDUCATION: CPT | Performed by: PHYSICAL THERAPIST

## 2017-01-24 RX ADMIN — ENOXAPARIN SODIUM 70 MG: 80 INJECTION SUBCUTANEOUS at 21:41

## 2017-01-24 RX ADMIN — OXYCODONE HYDROCHLORIDE 10 MG: 10 TABLET ORAL at 21:42

## 2017-01-24 RX ADMIN — ACETAMINOPHEN 975 MG: 325 TABLET, FILM COATED ORAL at 21:41

## 2017-01-24 RX ADMIN — ENOXAPARIN SODIUM 70 MG: 80 INJECTION SUBCUTANEOUS at 09:05

## 2017-01-24 RX ADMIN — WARFARIN SODIUM 5 MG: 5 TABLET ORAL at 18:20

## 2017-01-24 RX ADMIN — ATORVASTATIN CALCIUM 10 MG: 10 TABLET, FILM COATED ORAL at 16:11

## 2017-01-24 RX ADMIN — POTASSIUM CHLORIDE 20 MEQ: 1500 TABLET, EXTENDED RELEASE ORAL at 09:05

## 2017-01-24 RX ADMIN — ACETAMINOPHEN 975 MG: 325 TABLET, FILM COATED ORAL at 06:09

## 2017-01-24 RX ADMIN — ACETAMINOPHEN 975 MG: 325 TABLET, FILM COATED ORAL at 14:08

## 2017-01-25 PROCEDURE — 97530 THERAPEUTIC ACTIVITIES: CPT

## 2017-01-25 PROCEDURE — 97110 THERAPEUTIC EXERCISES: CPT

## 2017-01-25 PROCEDURE — 97535 SELF CARE MNGMENT TRAINING: CPT

## 2017-01-25 RX ADMIN — ACETAMINOPHEN 975 MG: 325 TABLET, FILM COATED ORAL at 14:57

## 2017-01-25 RX ADMIN — OXYCODONE HYDROCHLORIDE 5 MG: 5 TABLET ORAL at 19:50

## 2017-01-25 RX ADMIN — WARFARIN SODIUM 5 MG: 5 TABLET ORAL at 17:32

## 2017-01-25 RX ADMIN — POTASSIUM CHLORIDE 20 MEQ: 1500 TABLET, EXTENDED RELEASE ORAL at 09:07

## 2017-01-25 RX ADMIN — ACETAMINOPHEN 975 MG: 325 TABLET, FILM COATED ORAL at 06:09

## 2017-01-25 RX ADMIN — ENOXAPARIN SODIUM 70 MG: 80 INJECTION SUBCUTANEOUS at 09:06

## 2017-01-25 RX ADMIN — ALPRAZOLAM 0.25 MG: 0.25 TABLET ORAL at 23:32

## 2017-01-25 RX ADMIN — ATORVASTATIN CALCIUM 10 MG: 10 TABLET, FILM COATED ORAL at 16:41

## 2017-01-25 RX ADMIN — ENOXAPARIN SODIUM 70 MG: 80 INJECTION SUBCUTANEOUS at 21:06

## 2017-01-25 RX ADMIN — ACETAMINOPHEN 975 MG: 325 TABLET, FILM COATED ORAL at 21:07

## 2017-01-26 LAB
ANION GAP SERPL CALCULATED.3IONS-SCNC: 6 MMOL/L (ref 4–13)
BASOPHILS # BLD AUTO: 0.02 THOUSANDS/ΜL (ref 0–0.1)
BASOPHILS NFR BLD AUTO: 0 % (ref 0–1)
BUN SERPL-MCNC: 14 MG/DL (ref 5–25)
CALCIUM SERPL-MCNC: 8.9 MG/DL (ref 8.3–10.1)
CHLORIDE SERPL-SCNC: 106 MMOL/L (ref 100–108)
CO2 SERPL-SCNC: 30 MMOL/L (ref 21–32)
CREAT SERPL-MCNC: 0.48 MG/DL (ref 0.6–1.3)
EOSINOPHIL # BLD AUTO: 0.15 THOUSAND/ΜL (ref 0–0.61)
EOSINOPHIL NFR BLD AUTO: 2 % (ref 0–6)
ERYTHROCYTE [DISTWIDTH] IN BLOOD BY AUTOMATED COUNT: 21 % (ref 11.6–15.1)
GFR SERPL CREATININE-BSD FRML MDRD: >60 ML/MIN/1.73SQ M
GLUCOSE SERPL-MCNC: 83 MG/DL (ref 65–140)
HCT VFR BLD AUTO: 29.2 % (ref 34.8–46.1)
HGB BLD-MCNC: 9.3 G/DL (ref 11.5–15.4)
INR PPP: 1.44 (ref 0.86–1.16)
LYMPHOCYTES # BLD AUTO: 2.92 THOUSANDS/ΜL (ref 0.6–4.47)
LYMPHOCYTES NFR BLD AUTO: 45 % (ref 14–44)
MCH RBC QN AUTO: 29.9 PG (ref 26.8–34.3)
MCHC RBC AUTO-ENTMCNC: 31.8 G/DL (ref 31.4–37.4)
MCV RBC AUTO: 94 FL (ref 82–98)
MONOCYTES # BLD AUTO: 0.48 THOUSAND/ΜL (ref 0.17–1.22)
MONOCYTES NFR BLD AUTO: 7 % (ref 4–12)
NEUTROPHILS # BLD AUTO: 2.9 THOUSANDS/ΜL (ref 1.85–7.62)
NEUTS SEG NFR BLD AUTO: 46 % (ref 43–75)
NRBC BLD AUTO-RTO: 0 /100 WBCS
PLATELET # BLD AUTO: 591 THOUSANDS/UL (ref 149–390)
PMV BLD AUTO: 8.8 FL (ref 8.9–12.7)
POTASSIUM SERPL-SCNC: 3.7 MMOL/L (ref 3.5–5.3)
PROTHROMBIN TIME: 17.5 SECONDS (ref 12–14.3)
RBC # BLD AUTO: 3.11 MILLION/UL (ref 3.81–5.12)
SODIUM SERPL-SCNC: 142 MMOL/L (ref 136–145)
WBC # BLD AUTO: 6.49 THOUSAND/UL (ref 4.31–10.16)

## 2017-01-26 PROCEDURE — 97116 GAIT TRAINING THERAPY: CPT

## 2017-01-26 PROCEDURE — 97542 WHEELCHAIR MNGMENT TRAINING: CPT

## 2017-01-26 PROCEDURE — 85025 COMPLETE CBC W/AUTO DIFF WBC: CPT | Performed by: PHYSICIAN ASSISTANT

## 2017-01-26 PROCEDURE — 97112 NEUROMUSCULAR REEDUCATION: CPT

## 2017-01-26 PROCEDURE — 97110 THERAPEUTIC EXERCISES: CPT

## 2017-01-26 PROCEDURE — 85610 PROTHROMBIN TIME: CPT | Performed by: PHYSICIAN ASSISTANT

## 2017-01-26 PROCEDURE — 97530 THERAPEUTIC ACTIVITIES: CPT

## 2017-01-26 PROCEDURE — 80048 BASIC METABOLIC PNL TOTAL CA: CPT | Performed by: PHYSICIAN ASSISTANT

## 2017-01-26 PROCEDURE — 97535 SELF CARE MNGMENT TRAINING: CPT

## 2017-01-26 RX ORDER — ATORVASTATIN CALCIUM 40 MG/1
40 TABLET, FILM COATED ORAL
Status: DISCONTINUED | OUTPATIENT
Start: 2017-01-27 | End: 2017-01-31 | Stop reason: HOSPADM

## 2017-01-26 RX ADMIN — WARFARIN SODIUM 5 MG: 5 TABLET ORAL at 17:08

## 2017-01-26 RX ADMIN — ACETAMINOPHEN 975 MG: 325 TABLET, FILM COATED ORAL at 21:20

## 2017-01-26 RX ADMIN — POTASSIUM CHLORIDE 20 MEQ: 1500 TABLET, EXTENDED RELEASE ORAL at 08:53

## 2017-01-26 RX ADMIN — ACETAMINOPHEN 975 MG: 325 TABLET, FILM COATED ORAL at 06:04

## 2017-01-26 RX ADMIN — ENOXAPARIN SODIUM 70 MG: 80 INJECTION SUBCUTANEOUS at 08:53

## 2017-01-26 RX ADMIN — ALPRAZOLAM 0.25 MG: 0.25 TABLET ORAL at 21:20

## 2017-01-26 RX ADMIN — ACETAMINOPHEN 975 MG: 325 TABLET, FILM COATED ORAL at 15:02

## 2017-01-26 RX ADMIN — ENOXAPARIN SODIUM 70 MG: 80 INJECTION SUBCUTANEOUS at 21:20

## 2017-01-27 LAB
INR PPP: 1.47 (ref 0.86–1.16)
PROTHROMBIN TIME: 17.8 SECONDS (ref 12–14.3)

## 2017-01-27 PROCEDURE — 97530 THERAPEUTIC ACTIVITIES: CPT

## 2017-01-27 PROCEDURE — 97110 THERAPEUTIC EXERCISES: CPT

## 2017-01-27 PROCEDURE — 85610 PROTHROMBIN TIME: CPT | Performed by: NURSE PRACTITIONER

## 2017-01-27 RX ORDER — ZOLPIDEM TARTRATE 5 MG/1
5 TABLET ORAL
Status: DISCONTINUED | OUTPATIENT
Start: 2017-01-27 | End: 2017-01-31

## 2017-01-27 RX ADMIN — ATORVASTATIN CALCIUM 40 MG: 40 TABLET, FILM COATED ORAL at 16:35

## 2017-01-27 RX ADMIN — ACETAMINOPHEN 975 MG: 325 TABLET, FILM COATED ORAL at 21:34

## 2017-01-27 RX ADMIN — POTASSIUM CHLORIDE 20 MEQ: 1500 TABLET, EXTENDED RELEASE ORAL at 09:13

## 2017-01-27 RX ADMIN — ENOXAPARIN SODIUM 70 MG: 80 INJECTION SUBCUTANEOUS at 21:34

## 2017-01-27 RX ADMIN — ACETAMINOPHEN 975 MG: 325 TABLET, FILM COATED ORAL at 06:08

## 2017-01-27 RX ADMIN — WARFARIN SODIUM 5 MG: 5 TABLET ORAL at 19:34

## 2017-01-27 RX ADMIN — ACETAMINOPHEN 975 MG: 325 TABLET, FILM COATED ORAL at 14:48

## 2017-01-27 RX ADMIN — ENOXAPARIN SODIUM 70 MG: 80 INJECTION SUBCUTANEOUS at 09:14

## 2017-01-27 RX ADMIN — ZOLPIDEM TARTRATE 5 MG: 5 TABLET, FILM COATED ORAL at 19:34

## 2017-01-28 LAB
BASOPHILS # BLD AUTO: 0.02 THOUSANDS/ΜL (ref 0–0.1)
BASOPHILS NFR BLD AUTO: 0 % (ref 0–1)
EOSINOPHIL # BLD AUTO: 0.17 THOUSAND/ΜL (ref 0–0.61)
EOSINOPHIL NFR BLD AUTO: 2 % (ref 0–6)
ERYTHROCYTE [DISTWIDTH] IN BLOOD BY AUTOMATED COUNT: 22.2 % (ref 11.6–15.1)
HCT VFR BLD AUTO: 33.7 % (ref 34.8–46.1)
HGB BLD-MCNC: 10.5 G/DL (ref 11.5–15.4)
INR PPP: 1.58 (ref 0.86–1.16)
LYMPHOCYTES # BLD AUTO: 3.1 THOUSANDS/ΜL (ref 0.6–4.47)
LYMPHOCYTES NFR BLD AUTO: 45 % (ref 14–44)
MCH RBC QN AUTO: 30 PG (ref 26.8–34.3)
MCHC RBC AUTO-ENTMCNC: 31.2 G/DL (ref 31.4–37.4)
MCV RBC AUTO: 96 FL (ref 82–98)
MONOCYTES # BLD AUTO: 0.44 THOUSAND/ΜL (ref 0.17–1.22)
MONOCYTES NFR BLD AUTO: 6 % (ref 4–12)
NEUTROPHILS # BLD AUTO: 3.2 THOUSANDS/ΜL (ref 1.85–7.62)
NEUTS SEG NFR BLD AUTO: 47 % (ref 43–75)
NRBC BLD AUTO-RTO: 0 /100 WBCS
PLATELET # BLD AUTO: 677 THOUSANDS/UL (ref 149–390)
PMV BLD AUTO: 9 FL (ref 8.9–12.7)
PROTHROMBIN TIME: 18.8 SECONDS (ref 12–14.3)
RBC # BLD AUTO: 3.5 MILLION/UL (ref 3.81–5.12)
WBC # BLD AUTO: 6.95 THOUSAND/UL (ref 4.31–10.16)

## 2017-01-28 PROCEDURE — 97530 THERAPEUTIC ACTIVITIES: CPT | Performed by: STUDENT IN AN ORGANIZED HEALTH CARE EDUCATION/TRAINING PROGRAM

## 2017-01-28 PROCEDURE — 85025 COMPLETE CBC W/AUTO DIFF WBC: CPT | Performed by: NURSE PRACTITIONER

## 2017-01-28 PROCEDURE — 97530 THERAPEUTIC ACTIVITIES: CPT

## 2017-01-28 PROCEDURE — 85610 PROTHROMBIN TIME: CPT | Performed by: NURSE PRACTITIONER

## 2017-01-28 RX ORDER — WARFARIN SODIUM 7.5 MG/1
7.5 TABLET ORAL
Status: DISCONTINUED | OUTPATIENT
Start: 2017-01-28 | End: 2017-01-28

## 2017-01-28 RX ORDER — WARFARIN SODIUM 5 MG/1
5 TABLET ORAL
Status: DISCONTINUED | OUTPATIENT
Start: 2017-01-29 | End: 2017-01-30

## 2017-01-28 RX ORDER — WARFARIN SODIUM 6 MG/1
6 TABLET ORAL
Status: COMPLETED | OUTPATIENT
Start: 2017-01-28 | End: 2017-01-28

## 2017-01-28 RX ADMIN — ENOXAPARIN SODIUM 70 MG: 80 INJECTION SUBCUTANEOUS at 08:00

## 2017-01-28 RX ADMIN — POTASSIUM CHLORIDE 20 MEQ: 1500 TABLET, EXTENDED RELEASE ORAL at 08:00

## 2017-01-28 RX ADMIN — WARFARIN SODIUM 6 MG: 6 TABLET ORAL at 21:06

## 2017-01-28 RX ADMIN — ATORVASTATIN CALCIUM 40 MG: 40 TABLET, FILM COATED ORAL at 16:39

## 2017-01-28 RX ADMIN — ACETAMINOPHEN 975 MG: 325 TABLET, FILM COATED ORAL at 14:39

## 2017-01-28 RX ADMIN — ZOLPIDEM TARTRATE 5 MG: 5 TABLET, FILM COATED ORAL at 22:27

## 2017-01-28 RX ADMIN — ACETAMINOPHEN 975 MG: 325 TABLET, FILM COATED ORAL at 21:07

## 2017-01-28 RX ADMIN — ACETAMINOPHEN 975 MG: 325 TABLET, FILM COATED ORAL at 05:12

## 2017-01-29 ENCOUNTER — APPOINTMENT (INPATIENT)
Dept: RADIOLOGY | Facility: HOSPITAL | Age: 62
DRG: 560 | End: 2017-01-29
Payer: COMMERCIAL

## 2017-01-29 LAB
INR PPP: 1.84 (ref 0.86–1.16)
PROTHROMBIN TIME: 21.1 SECONDS (ref 12–14.3)

## 2017-01-29 PROCEDURE — 97110 THERAPEUTIC EXERCISES: CPT

## 2017-01-29 PROCEDURE — 85610 PROTHROMBIN TIME: CPT | Performed by: NURSE PRACTITIONER

## 2017-01-29 PROCEDURE — 97530 THERAPEUTIC ACTIVITIES: CPT

## 2017-01-29 PROCEDURE — 73502 X-RAY EXAM HIP UNI 2-3 VIEWS: CPT

## 2017-01-29 PROCEDURE — 97530 THERAPEUTIC ACTIVITIES: CPT | Performed by: STUDENT IN AN ORGANIZED HEALTH CARE EDUCATION/TRAINING PROGRAM

## 2017-01-29 RX ORDER — CEPHALEXIN 500 MG/1
500 CAPSULE ORAL EVERY 6 HOURS SCHEDULED
Status: DISCONTINUED | OUTPATIENT
Start: 2017-01-29 | End: 2017-01-31 | Stop reason: HOSPADM

## 2017-01-29 RX ADMIN — WARFARIN SODIUM 5 MG: 5 TABLET ORAL at 18:10

## 2017-01-29 RX ADMIN — POTASSIUM CHLORIDE 20 MEQ: 1500 TABLET, EXTENDED RELEASE ORAL at 08:06

## 2017-01-29 RX ADMIN — ATORVASTATIN CALCIUM 40 MG: 40 TABLET, FILM COATED ORAL at 16:55

## 2017-01-29 RX ADMIN — CEPHALEXIN 500 MG: 500 CAPSULE ORAL at 14:26

## 2017-01-29 RX ADMIN — ACETAMINOPHEN 975 MG: 325 TABLET, FILM COATED ORAL at 14:26

## 2017-01-29 RX ADMIN — ACETAMINOPHEN 975 MG: 325 TABLET, FILM COATED ORAL at 21:33

## 2017-01-29 RX ADMIN — ACETAMINOPHEN 975 MG: 325 TABLET, FILM COATED ORAL at 05:37

## 2017-01-29 RX ADMIN — CEPHALEXIN 500 MG: 500 CAPSULE ORAL at 08:06

## 2017-01-29 RX ADMIN — ZOLPIDEM TARTRATE 5 MG: 5 TABLET, FILM COATED ORAL at 21:33

## 2017-01-29 RX ADMIN — CEPHALEXIN 500 MG: 500 CAPSULE ORAL at 17:23

## 2017-01-30 LAB
ANION GAP SERPL CALCULATED.3IONS-SCNC: 9 MMOL/L (ref 4–13)
BASOPHILS # BLD AUTO: 0.05 THOUSANDS/ΜL (ref 0–0.1)
BASOPHILS NFR BLD AUTO: 1 % (ref 0–1)
BUN SERPL-MCNC: 13 MG/DL (ref 5–25)
CALCIUM SERPL-MCNC: 9.1 MG/DL (ref 8.3–10.1)
CHLORIDE SERPL-SCNC: 105 MMOL/L (ref 100–108)
CO2 SERPL-SCNC: 25 MMOL/L (ref 21–32)
CREAT SERPL-MCNC: 0.51 MG/DL (ref 0.6–1.3)
EOSINOPHIL # BLD AUTO: 0.15 THOUSAND/ΜL (ref 0–0.61)
EOSINOPHIL NFR BLD AUTO: 2 % (ref 0–6)
ERYTHROCYTE [DISTWIDTH] IN BLOOD BY AUTOMATED COUNT: 22 % (ref 11.6–15.1)
GFR SERPL CREATININE-BSD FRML MDRD: >60 ML/MIN/1.73SQ M
GLUCOSE SERPL-MCNC: 80 MG/DL (ref 65–140)
HCT VFR BLD AUTO: 28.5 % (ref 34.8–46.1)
HGB BLD-MCNC: 9 G/DL (ref 11.5–15.4)
INR PPP: 1.79 (ref 0.86–1.16)
LYMPHOCYTES # BLD AUTO: 2.7 THOUSANDS/ΜL (ref 0.6–4.47)
LYMPHOCYTES NFR BLD AUTO: 38 % (ref 14–44)
MCH RBC QN AUTO: 30.5 PG (ref 26.8–34.3)
MCHC RBC AUTO-ENTMCNC: 31.6 G/DL (ref 31.4–37.4)
MCV RBC AUTO: 97 FL (ref 82–98)
MONOCYTES # BLD AUTO: 0.53 THOUSAND/ΜL (ref 0.17–1.22)
MONOCYTES NFR BLD AUTO: 7 % (ref 4–12)
NEUTROPHILS # BLD AUTO: 3.74 THOUSANDS/ΜL (ref 1.85–7.62)
NEUTS SEG NFR BLD AUTO: 52 % (ref 43–75)
NRBC BLD AUTO-RTO: 0 /100 WBCS
PLATELET # BLD AUTO: 800 THOUSANDS/UL (ref 149–390)
PMV BLD AUTO: 9 FL (ref 8.9–12.7)
POTASSIUM SERPL-SCNC: 4.4 MMOL/L (ref 3.5–5.3)
PROTHROMBIN TIME: 20.7 SECONDS (ref 12–14.3)
RBC # BLD AUTO: 2.95 MILLION/UL (ref 3.81–5.12)
SODIUM SERPL-SCNC: 139 MMOL/L (ref 136–145)
WBC # BLD AUTO: 7.19 THOUSAND/UL (ref 4.31–10.16)

## 2017-01-30 PROCEDURE — 85025 COMPLETE CBC W/AUTO DIFF WBC: CPT | Performed by: NURSE PRACTITIONER

## 2017-01-30 PROCEDURE — 97116 GAIT TRAINING THERAPY: CPT

## 2017-01-30 PROCEDURE — 97535 SELF CARE MNGMENT TRAINING: CPT

## 2017-01-30 PROCEDURE — 93005 ELECTROCARDIOGRAM TRACING: CPT | Performed by: PHYSICAL MEDICINE & REHABILITATION

## 2017-01-30 PROCEDURE — 80048 BASIC METABOLIC PNL TOTAL CA: CPT | Performed by: NURSE PRACTITIONER

## 2017-01-30 PROCEDURE — 97110 THERAPEUTIC EXERCISES: CPT

## 2017-01-30 PROCEDURE — 85610 PROTHROMBIN TIME: CPT | Performed by: NURSE PRACTITIONER

## 2017-01-30 PROCEDURE — 97112 NEUROMUSCULAR REEDUCATION: CPT

## 2017-01-30 RX ORDER — WARFARIN SODIUM 6 MG/1
6 TABLET ORAL
Status: DISCONTINUED | OUTPATIENT
Start: 2017-01-30 | End: 2017-01-31

## 2017-01-30 RX ADMIN — SENNOSIDES 8.6 MG: 8.6 TABLET, FILM COATED ORAL at 09:02

## 2017-01-30 RX ADMIN — ACETAMINOPHEN 975 MG: 325 TABLET, FILM COATED ORAL at 05:41

## 2017-01-30 RX ADMIN — CEPHALEXIN 500 MG: 500 CAPSULE ORAL at 06:48

## 2017-01-30 RX ADMIN — CEPHALEXIN 500 MG: 500 CAPSULE ORAL at 11:36

## 2017-01-30 RX ADMIN — WARFARIN SODIUM 6 MG: 6 TABLET ORAL at 17:18

## 2017-01-30 RX ADMIN — POLYETHYLENE GLYCOL 3350 17 G: 17 POWDER, FOR SOLUTION ORAL at 09:02

## 2017-01-30 RX ADMIN — ACETAMINOPHEN 975 MG: 325 TABLET, FILM COATED ORAL at 21:48

## 2017-01-30 RX ADMIN — CEPHALEXIN 500 MG: 500 CAPSULE ORAL at 01:00

## 2017-01-30 RX ADMIN — ATORVASTATIN CALCIUM 40 MG: 40 TABLET, FILM COATED ORAL at 17:18

## 2017-01-30 RX ADMIN — ZOLPIDEM TARTRATE 5 MG: 5 TABLET, FILM COATED ORAL at 21:50

## 2017-01-30 RX ADMIN — ACETAMINOPHEN 975 MG: 325 TABLET, FILM COATED ORAL at 14:05

## 2017-01-30 RX ADMIN — POTASSIUM CHLORIDE 20 MEQ: 1500 TABLET, EXTENDED RELEASE ORAL at 09:02

## 2017-01-30 RX ADMIN — CEPHALEXIN 500 MG: 500 CAPSULE ORAL at 17:18

## 2017-01-31 VITALS
BODY MASS INDEX: 25.31 KG/M2 | SYSTOLIC BLOOD PRESSURE: 120 MMHG | DIASTOLIC BLOOD PRESSURE: 83 MMHG | RESPIRATION RATE: 20 BRPM | HEART RATE: 91 BPM | OXYGEN SATURATION: 97 % | WEIGHT: 151.9 LBS | HEIGHT: 65 IN | TEMPERATURE: 97.6 F

## 2017-01-31 LAB
INR PPP: 2.11 (ref 0.86–1.16)
PROTHROMBIN TIME: 23.4 SECONDS (ref 12–14.3)

## 2017-01-31 PROCEDURE — 85610 PROTHROMBIN TIME: CPT | Performed by: NURSE PRACTITIONER

## 2017-01-31 PROCEDURE — 97110 THERAPEUTIC EXERCISES: CPT | Performed by: PHYSICAL THERAPIST

## 2017-01-31 PROCEDURE — 97530 THERAPEUTIC ACTIVITIES: CPT | Performed by: PHYSICAL THERAPIST

## 2017-01-31 RX ORDER — POTASSIUM CHLORIDE 20 MEQ/1
20 TABLET, EXTENDED RELEASE ORAL DAILY
Qty: 30 TABLET | Refills: 0 | Status: SHIPPED | OUTPATIENT
Start: 2017-02-01 | End: 2018-01-17

## 2017-01-31 RX ORDER — ATORVASTATIN CALCIUM 40 MG/1
40 TABLET, FILM COATED ORAL
Qty: 30 TABLET | Refills: 0 | Status: SHIPPED | OUTPATIENT
Start: 2017-01-31 | End: 2020-06-12

## 2017-01-31 RX ORDER — WARFARIN SODIUM 5 MG/1
5 TABLET ORAL
Status: DISCONTINUED | OUTPATIENT
Start: 2017-01-31 | End: 2017-01-31 | Stop reason: HOSPADM

## 2017-01-31 RX ORDER — WARFARIN SODIUM 2.5 MG/1
5 TABLET ORAL EVERY EVENING
Qty: 60 TABLET | Refills: 0 | Status: SHIPPED | OUTPATIENT
Start: 2017-01-31 | End: 2017-07-22 | Stop reason: ALTCHOICE

## 2017-01-31 RX ORDER — CEPHALEXIN 500 MG/1
500 CAPSULE ORAL EVERY 6 HOURS SCHEDULED
Qty: 18 CAPSULE | Refills: 0 | Status: SHIPPED | OUTPATIENT
Start: 2017-01-31 | End: 2017-02-05

## 2017-01-31 RX ADMIN — ACETAMINOPHEN 975 MG: 325 TABLET, FILM COATED ORAL at 13:41

## 2017-01-31 RX ADMIN — CEPHALEXIN 500 MG: 500 CAPSULE ORAL at 11:27

## 2017-01-31 RX ADMIN — CEPHALEXIN 500 MG: 500 CAPSULE ORAL at 00:04

## 2017-01-31 RX ADMIN — POTASSIUM CHLORIDE 20 MEQ: 1500 TABLET, EXTENDED RELEASE ORAL at 08:58

## 2017-01-31 RX ADMIN — ACETAMINOPHEN 975 MG: 325 TABLET, FILM COATED ORAL at 05:46

## 2017-01-31 RX ADMIN — CEPHALEXIN 500 MG: 500 CAPSULE ORAL at 05:46

## 2017-02-01 ENCOUNTER — GENERIC CONVERSION - ENCOUNTER (OUTPATIENT)
Dept: OTHER | Facility: OTHER | Age: 62
End: 2017-02-01

## 2017-02-01 ENCOUNTER — LAB REQUISITION (OUTPATIENT)
Dept: LAB | Facility: HOSPITAL | Age: 62
End: 2017-02-01
Payer: COMMERCIAL

## 2017-02-01 DIAGNOSIS — E87.6 HYPOKALEMIA: ICD-10-CM

## 2017-02-01 DIAGNOSIS — I63.9 CEREBRAL INFARCTION (HCC): ICD-10-CM

## 2017-02-01 DIAGNOSIS — D47.3 ESSENTIAL HEMORRHAGIC THROMBOCYTHEMIA (HCC): ICD-10-CM

## 2017-02-01 LAB
ANION GAP SERPL CALCULATED.3IONS-SCNC: 10 MMOL/L (ref 4–13)
BASOPHILS # BLD AUTO: 0.04 THOUSANDS/ΜL (ref 0–0.1)
BASOPHILS NFR BLD AUTO: 0 % (ref 0–1)
BUN SERPL-MCNC: 17 MG/DL (ref 5–25)
CALCIUM SERPL-MCNC: 9.3 MG/DL (ref 8.3–10.1)
CHLORIDE SERPL-SCNC: 102 MMOL/L (ref 100–108)
CO2 SERPL-SCNC: 26 MMOL/L (ref 21–32)
CREAT SERPL-MCNC: 0.23 MG/DL (ref 0.6–1.3)
EOSINOPHIL # BLD AUTO: 0.07 THOUSAND/ΜL (ref 0–0.61)
EOSINOPHIL NFR BLD AUTO: 1 % (ref 0–6)
ERYTHROCYTE [DISTWIDTH] IN BLOOD BY AUTOMATED COUNT: 21.2 % (ref 11.6–15.1)
GFR SERPL CREATININE-BSD FRML MDRD: >60 ML/MIN/1.73SQ M
GLUCOSE SERPL-MCNC: 85 MG/DL (ref 65–140)
HCT VFR BLD AUTO: 38 % (ref 34.8–46.1)
HGB BLD-MCNC: 12 G/DL (ref 11.5–15.4)
INR PPP: 2.38 (ref 0.86–1.16)
LYMPHOCYTES # BLD AUTO: 2.18 THOUSANDS/ΜL (ref 0.6–4.47)
LYMPHOCYTES NFR BLD AUTO: 24 % (ref 14–44)
MCH RBC QN AUTO: 31.2 PG (ref 26.8–34.3)
MCHC RBC AUTO-ENTMCNC: 31.6 G/DL (ref 31.4–37.4)
MCV RBC AUTO: 99 FL (ref 82–98)
MONOCYTES # BLD AUTO: 0.64 THOUSAND/ΜL (ref 0.17–1.22)
MONOCYTES NFR BLD AUTO: 7 % (ref 4–12)
NEUTROPHILS # BLD AUTO: 6.1 THOUSANDS/ΜL (ref 1.85–7.62)
NEUTS SEG NFR BLD AUTO: 68 % (ref 43–75)
PLATELET # BLD AUTO: 809 THOUSANDS/UL (ref 149–390)
PMV BLD AUTO: 9.6 FL (ref 8.9–12.7)
POTASSIUM SERPL-SCNC: 5.6 MMOL/L (ref 3.5–5.3)
PROTHROMBIN TIME: 24.9 SECONDS (ref 12–14.3)
RBC # BLD AUTO: 3.85 MILLION/UL (ref 3.81–5.12)
SODIUM SERPL-SCNC: 138 MMOL/L (ref 136–145)
WBC # BLD AUTO: 9.03 THOUSAND/UL (ref 4.31–10.16)

## 2017-02-01 PROCEDURE — 85025 COMPLETE CBC W/AUTO DIFF WBC: CPT | Performed by: PHYSICAL MEDICINE & REHABILITATION

## 2017-02-01 PROCEDURE — 80048 BASIC METABOLIC PNL TOTAL CA: CPT | Performed by: PHYSICAL MEDICINE & REHABILITATION

## 2017-02-01 PROCEDURE — 85610 PROTHROMBIN TIME: CPT | Performed by: PHYSICAL MEDICINE & REHABILITATION

## 2017-02-02 ENCOUNTER — ALLSCRIPTS OFFICE VISIT (OUTPATIENT)
Dept: OTHER | Facility: OTHER | Age: 62
End: 2017-02-02

## 2017-02-03 LAB
ATRIAL RATE: 107 BPM
P AXIS: 47 DEGREES
PR INTERVAL: 120 MS
QRS AXIS: 38 DEGREES
QRSD INTERVAL: 82 MS
QT INTERVAL: 352 MS
QTC INTERVAL: 469 MS
T WAVE AXIS: 71 DEGREES
VENTRICULAR RATE: 107 BPM

## 2017-02-13 ENCOUNTER — LAB REQUISITION (OUTPATIENT)
Dept: LAB | Facility: HOSPITAL | Age: 62
End: 2017-02-13
Payer: COMMERCIAL

## 2017-02-13 DIAGNOSIS — Z79.01 LONG TERM CURRENT USE OF ANTICOAGULANT: ICD-10-CM

## 2017-02-13 LAB
INR PPP: 5.79 (ref 0.86–1.16)
PROTHROMBIN TIME: 48.8 SECONDS (ref 12–14.3)

## 2017-02-13 PROCEDURE — 85610 PROTHROMBIN TIME: CPT | Performed by: FAMILY MEDICINE

## 2017-02-14 ENCOUNTER — ALLSCRIPTS OFFICE VISIT (OUTPATIENT)
Dept: OTHER | Facility: OTHER | Age: 62
End: 2017-02-14

## 2017-02-16 ENCOUNTER — LAB REQUISITION (OUTPATIENT)
Dept: LAB | Facility: HOSPITAL | Age: 62
End: 2017-02-16
Payer: COMMERCIAL

## 2017-02-16 DIAGNOSIS — Z79.01 LONG TERM CURRENT USE OF ANTICOAGULANT: ICD-10-CM

## 2017-02-16 LAB
INR PPP: 2.81 (ref 0.86–1.16)
PROTHROMBIN TIME: 28.2 SECONDS (ref 12–14.3)

## 2017-02-16 PROCEDURE — 85610 PROTHROMBIN TIME: CPT | Performed by: FAMILY MEDICINE

## 2017-02-24 ENCOUNTER — APPOINTMENT (OUTPATIENT)
Dept: LAB | Facility: HOSPITAL | Age: 62
End: 2017-02-24
Payer: COMMERCIAL

## 2017-02-24 ENCOUNTER — TRANSCRIBE ORDERS (OUTPATIENT)
Dept: ADMINISTRATIVE | Facility: HOSPITAL | Age: 62
End: 2017-02-24

## 2017-02-24 DIAGNOSIS — Z79.01 ENCOUNTER FOR MONITORING COUMADIN THERAPY: Primary | ICD-10-CM

## 2017-02-24 DIAGNOSIS — Z51.81 ENCOUNTER FOR MONITORING COUMADIN THERAPY: Primary | ICD-10-CM

## 2017-02-24 DIAGNOSIS — Z79.01 ENCOUNTER FOR MONITORING COUMADIN THERAPY: ICD-10-CM

## 2017-02-24 DIAGNOSIS — Z51.81 ENCOUNTER FOR MONITORING COUMADIN THERAPY: ICD-10-CM

## 2017-02-24 LAB
INR PPP: 1.97 (ref 0.86–1.16)
PROTHROMBIN TIME: 21.6 SECONDS (ref 12–14.3)

## 2017-02-24 PROCEDURE — 85610 PROTHROMBIN TIME: CPT

## 2017-02-24 PROCEDURE — 36415 COLL VENOUS BLD VENIPUNCTURE: CPT

## 2017-02-28 ENCOUNTER — ALLSCRIPTS OFFICE VISIT (OUTPATIENT)
Dept: OTHER | Facility: OTHER | Age: 62
End: 2017-02-28

## 2017-03-09 ENCOUNTER — APPOINTMENT (OUTPATIENT)
Dept: LAB | Facility: HOSPITAL | Age: 62
End: 2017-03-09
Payer: COMMERCIAL

## 2017-03-09 ENCOUNTER — TRANSCRIBE ORDERS (OUTPATIENT)
Dept: ADMINISTRATIVE | Facility: HOSPITAL | Age: 62
End: 2017-03-09

## 2017-03-09 DIAGNOSIS — Z79.01 LONG TERM (CURRENT) USE OF ANTICOAGULANTS: ICD-10-CM

## 2017-03-09 DIAGNOSIS — I10 UNSPECIFIED ESSENTIAL HYPERTENSION: ICD-10-CM

## 2017-03-09 DIAGNOSIS — E78.5 HYPERLIPIDEMIA, UNSPECIFIED HYPERLIPIDEMIA TYPE: ICD-10-CM

## 2017-03-09 DIAGNOSIS — E55.9 UNSPECIFIED VITAMIN D DEFICIENCY: ICD-10-CM

## 2017-03-09 DIAGNOSIS — Z79.01 LONG TERM (CURRENT) USE OF ANTICOAGULANTS: Primary | ICD-10-CM

## 2017-03-09 LAB
ALBUMIN SERPL BCP-MCNC: 3.5 G/DL (ref 3.5–5)
ALP SERPL-CCNC: 146 U/L (ref 46–116)
ALT SERPL W P-5'-P-CCNC: 27 U/L (ref 12–78)
ANION GAP SERPL CALCULATED.3IONS-SCNC: 8 MMOL/L (ref 4–13)
AST SERPL W P-5'-P-CCNC: 38 U/L (ref 5–45)
BILIRUB SERPL-MCNC: 0.6 MG/DL (ref 0.2–1)
BUN SERPL-MCNC: 9 MG/DL (ref 5–25)
CALCIUM SERPL-MCNC: 9.6 MG/DL (ref 8.3–10.1)
CHLORIDE SERPL-SCNC: 104 MMOL/L (ref 100–108)
CHOLEST SERPL-MCNC: 133 MG/DL (ref 50–200)
CO2 SERPL-SCNC: 31 MMOL/L (ref 21–32)
CREAT SERPL-MCNC: 0.66 MG/DL (ref 0.6–1.3)
GFR SERPL CREATININE-BSD FRML MDRD: >60 ML/MIN/1.73SQ M
GLUCOSE SERPL-MCNC: 85 MG/DL (ref 65–140)
HDLC SERPL-MCNC: 52 MG/DL (ref 40–60)
LDLC SERPL CALC-MCNC: 56 MG/DL (ref 0–100)
POTASSIUM SERPL-SCNC: 4 MMOL/L (ref 3.5–5.3)
PROT SERPL-MCNC: 6.6 G/DL (ref 6.4–8.2)
SODIUM SERPL-SCNC: 143 MMOL/L (ref 136–145)
TRIGL SERPL-MCNC: 123 MG/DL
TSH SERPL DL<=0.05 MIU/L-ACNC: 2.16 UIU/ML (ref 0.36–3.74)

## 2017-03-09 PROCEDURE — 82306 VITAMIN D 25 HYDROXY: CPT

## 2017-03-09 PROCEDURE — 84443 ASSAY THYROID STIM HORMONE: CPT

## 2017-03-09 PROCEDURE — 80061 LIPID PANEL: CPT

## 2017-03-09 PROCEDURE — 80053 COMPREHEN METABOLIC PANEL: CPT

## 2017-03-09 PROCEDURE — 36415 COLL VENOUS BLD VENIPUNCTURE: CPT

## 2017-03-10 LAB — 25(OH)D3 SERPL-MCNC: 45.8 NG/ML (ref 30–100)

## 2017-03-13 ENCOUNTER — APPOINTMENT (OUTPATIENT)
Dept: LAB | Facility: HOSPITAL | Age: 62
End: 2017-03-13
Payer: COMMERCIAL

## 2017-03-13 ENCOUNTER — TRANSCRIBE ORDERS (OUTPATIENT)
Dept: ADMINISTRATIVE | Facility: HOSPITAL | Age: 62
End: 2017-03-13

## 2017-03-13 DIAGNOSIS — I10 ESSENTIAL HYPERTENSION, MALIGNANT: ICD-10-CM

## 2017-03-13 DIAGNOSIS — E03.9 UNSPECIFIED HYPOTHYROIDISM: ICD-10-CM

## 2017-03-13 DIAGNOSIS — E55.9 VITAMIN D DEFICIENCY, UNSPECIFIED: ICD-10-CM

## 2017-03-13 DIAGNOSIS — E78.5 HYPERLIPIDEMIA, UNSPECIFIED HYPERLIPIDEMIA TYPE: ICD-10-CM

## 2017-03-13 DIAGNOSIS — Z79.01 LONG TERM (CURRENT) USE OF ANTICOAGULANTS: Primary | ICD-10-CM

## 2017-03-13 DIAGNOSIS — Z79.01 LONG TERM (CURRENT) USE OF ANTICOAGULANTS: ICD-10-CM

## 2017-03-13 LAB
BASOPHILS # BLD AUTO: 0.03 THOUSANDS/ΜL (ref 0–0.1)
BASOPHILS NFR BLD AUTO: 1 % (ref 0–1)
EOSINOPHIL # BLD AUTO: 0.09 THOUSAND/ΜL (ref 0–0.61)
EOSINOPHIL NFR BLD AUTO: 2 % (ref 0–6)
ERYTHROCYTE [DISTWIDTH] IN BLOOD BY AUTOMATED COUNT: 15 % (ref 11.6–15.1)
HCT VFR BLD AUTO: 49 % (ref 34.8–46.1)
HGB BLD-MCNC: 15.4 G/DL (ref 11.5–15.4)
INR PPP: 2.65 (ref 0.86–1.16)
LYMPHOCYTES # BLD AUTO: 2.44 THOUSANDS/ΜL (ref 0.6–4.47)
LYMPHOCYTES NFR BLD AUTO: 42 % (ref 14–44)
MCH RBC QN AUTO: 30.1 PG (ref 26.8–34.3)
MCHC RBC AUTO-ENTMCNC: 31.4 G/DL (ref 31.4–37.4)
MCV RBC AUTO: 96 FL (ref 82–98)
MONOCYTES # BLD AUTO: 0.33 THOUSAND/ΜL (ref 0.17–1.22)
MONOCYTES NFR BLD AUTO: 6 % (ref 4–12)
NEUTROPHILS # BLD AUTO: 2.99 THOUSANDS/ΜL (ref 1.85–7.62)
NEUTS SEG NFR BLD AUTO: 49 % (ref 43–75)
PLATELET # BLD AUTO: 308 THOUSANDS/UL (ref 149–390)
PMV BLD AUTO: 10.8 FL (ref 8.9–12.7)
PROTHROMBIN TIME: 27 SECONDS (ref 12–14.3)
RBC # BLD AUTO: 5.12 MILLION/UL (ref 3.81–5.12)
WBC # BLD AUTO: 5.88 THOUSAND/UL (ref 4.31–10.16)

## 2017-03-13 PROCEDURE — 36415 COLL VENOUS BLD VENIPUNCTURE: CPT

## 2017-03-13 PROCEDURE — 85610 PROTHROMBIN TIME: CPT

## 2017-03-13 PROCEDURE — 85025 COMPLETE CBC W/AUTO DIFF WBC: CPT

## 2017-04-20 ENCOUNTER — APPOINTMENT (OUTPATIENT)
Dept: LAB | Facility: HOSPITAL | Age: 62
End: 2017-04-20
Payer: COMMERCIAL

## 2017-04-20 ENCOUNTER — TRANSCRIBE ORDERS (OUTPATIENT)
Dept: ADMINISTRATIVE | Facility: HOSPITAL | Age: 62
End: 2017-04-20

## 2017-04-20 DIAGNOSIS — Z92.29 HISTORY OF COUMADIN THERAPY: Primary | ICD-10-CM

## 2017-04-20 DIAGNOSIS — Z92.29 HISTORY OF COUMADIN THERAPY: ICD-10-CM

## 2017-04-20 LAB
INR PPP: 1.82 (ref 0.86–1.16)
PROTHROMBIN TIME: 20.3 SECONDS (ref 12–14.3)

## 2017-04-20 PROCEDURE — 85610 PROTHROMBIN TIME: CPT

## 2017-04-20 PROCEDURE — 36415 COLL VENOUS BLD VENIPUNCTURE: CPT

## 2017-05-08 ENCOUNTER — HOSPITAL ENCOUNTER (EMERGENCY)
Facility: HOSPITAL | Age: 62
Discharge: HOME | End: 2017-05-08
Attending: EMERGENCY MEDICINE | Admitting: EMERGENCY MEDICINE
Payer: COMMERCIAL

## 2017-05-08 ENCOUNTER — APPOINTMENT (EMERGENCY)
Dept: RADIOLOGY | Facility: HOSPITAL | Age: 62
End: 2017-05-08
Payer: COMMERCIAL

## 2017-05-08 VITALS
DIASTOLIC BLOOD PRESSURE: 65 MMHG | HEART RATE: 70 BPM | RESPIRATION RATE: 18 BRPM | OXYGEN SATURATION: 99 % | SYSTOLIC BLOOD PRESSURE: 111 MMHG | TEMPERATURE: 97 F | WEIGHT: 140 LBS | BODY MASS INDEX: 23.3 KG/M2

## 2017-05-08 DIAGNOSIS — S73.102A SPRAIN OF LEFT HIP: Primary | ICD-10-CM

## 2017-05-08 PROCEDURE — 99283 EMERGENCY DEPT VISIT LOW MDM: CPT

## 2017-05-08 PROCEDURE — 73502 X-RAY EXAM HIP UNI 2-3 VIEWS: CPT

## 2017-05-08 RX ORDER — MELATONIN
2000 DAILY
COMMUNITY

## 2017-05-26 ENCOUNTER — APPOINTMENT (OUTPATIENT)
Dept: LAB | Facility: HOSPITAL | Age: 62
End: 2017-05-26
Payer: COMMERCIAL

## 2017-05-26 ENCOUNTER — TRANSCRIBE ORDERS (OUTPATIENT)
Dept: ADMINISTRATIVE | Facility: HOSPITAL | Age: 62
End: 2017-05-26

## 2017-05-26 DIAGNOSIS — Z92.29 HISTORY OF COUMADIN THERAPY: ICD-10-CM

## 2017-05-26 DIAGNOSIS — Z92.29 HISTORY OF COUMADIN THERAPY: Primary | ICD-10-CM

## 2017-05-26 LAB
INR PPP: 1.22 (ref 0.86–1.16)
PROTHROMBIN TIME: 15.3 SECONDS (ref 12.1–14.4)

## 2017-05-26 PROCEDURE — 85610 PROTHROMBIN TIME: CPT

## 2017-05-26 PROCEDURE — 36415 COLL VENOUS BLD VENIPUNCTURE: CPT

## 2017-06-21 ENCOUNTER — APPOINTMENT (OUTPATIENT)
Dept: PHYSICAL THERAPY | Facility: CLINIC | Age: 62
End: 2017-06-21
Payer: COMMERCIAL

## 2017-06-21 PROCEDURE — 97163 PT EVAL HIGH COMPLEX 45 MIN: CPT

## 2017-06-21 PROCEDURE — 97116 GAIT TRAINING THERAPY: CPT

## 2017-06-26 ENCOUNTER — APPOINTMENT (OUTPATIENT)
Dept: PHYSICAL THERAPY | Facility: CLINIC | Age: 62
End: 2017-06-26
Payer: COMMERCIAL

## 2017-06-26 PROCEDURE — 97116 GAIT TRAINING THERAPY: CPT

## 2017-06-26 PROCEDURE — 97112 NEUROMUSCULAR REEDUCATION: CPT

## 2017-06-28 ENCOUNTER — APPOINTMENT (OUTPATIENT)
Dept: PHYSICAL THERAPY | Facility: CLINIC | Age: 62
End: 2017-06-28
Payer: COMMERCIAL

## 2017-06-28 PROCEDURE — 97112 NEUROMUSCULAR REEDUCATION: CPT

## 2017-06-28 PROCEDURE — 97116 GAIT TRAINING THERAPY: CPT

## 2017-06-28 PROCEDURE — 97110 THERAPEUTIC EXERCISES: CPT

## 2017-07-03 ENCOUNTER — APPOINTMENT (OUTPATIENT)
Dept: PHYSICAL THERAPY | Facility: CLINIC | Age: 62
End: 2017-07-03
Payer: COMMERCIAL

## 2017-07-03 PROCEDURE — 97112 NEUROMUSCULAR REEDUCATION: CPT

## 2017-07-03 PROCEDURE — 97116 GAIT TRAINING THERAPY: CPT

## 2017-07-03 PROCEDURE — 97110 THERAPEUTIC EXERCISES: CPT

## 2017-07-06 ENCOUNTER — APPOINTMENT (OUTPATIENT)
Dept: PHYSICAL THERAPY | Facility: CLINIC | Age: 62
End: 2017-07-06
Payer: COMMERCIAL

## 2017-07-06 PROCEDURE — 97110 THERAPEUTIC EXERCISES: CPT

## 2017-07-06 PROCEDURE — 97116 GAIT TRAINING THERAPY: CPT

## 2017-07-06 PROCEDURE — 97112 NEUROMUSCULAR REEDUCATION: CPT

## 2017-07-12 ENCOUNTER — APPOINTMENT (OUTPATIENT)
Dept: PHYSICAL THERAPY | Facility: CLINIC | Age: 62
End: 2017-07-12
Payer: COMMERCIAL

## 2017-07-12 PROCEDURE — 97116 GAIT TRAINING THERAPY: CPT

## 2017-07-12 PROCEDURE — 97112 NEUROMUSCULAR REEDUCATION: CPT

## 2017-07-22 ENCOUNTER — APPOINTMENT (EMERGENCY)
Dept: RADIOLOGY | Facility: HOSPITAL | Age: 62
End: 2017-07-22
Payer: COMMERCIAL

## 2017-07-22 ENCOUNTER — HOSPITAL ENCOUNTER (EMERGENCY)
Facility: HOSPITAL | Age: 62
Discharge: HOME | End: 2017-07-22
Payer: COMMERCIAL

## 2017-07-22 VITALS
RESPIRATION RATE: 16 BRPM | BODY MASS INDEX: 18.87 KG/M2 | WEIGHT: 127.43 LBS | HEART RATE: 77 BPM | TEMPERATURE: 98.8 F | DIASTOLIC BLOOD PRESSURE: 72 MMHG | SYSTOLIC BLOOD PRESSURE: 110 MMHG | OXYGEN SATURATION: 97 % | HEIGHT: 69 IN

## 2017-07-22 DIAGNOSIS — S93.401A RIGHT ANKLE SPRAIN: Primary | ICD-10-CM

## 2017-07-22 DIAGNOSIS — S80.11XA CONTUSION OF RIGHT LOWER LEG, INITIAL ENCOUNTER: ICD-10-CM

## 2017-07-22 PROCEDURE — 73610 X-RAY EXAM OF ANKLE: CPT

## 2017-07-22 PROCEDURE — 99283 EMERGENCY DEPT VISIT LOW MDM: CPT

## 2017-07-22 PROCEDURE — 73590 X-RAY EXAM OF LOWER LEG: CPT

## 2017-07-22 RX ORDER — TRAMADOL HYDROCHLORIDE 50 MG/1
50 TABLET ORAL EVERY 8 HOURS PRN
Qty: 12 TABLET | Refills: 0 | Status: SHIPPED | OUTPATIENT
Start: 2017-07-22 | End: 2017-08-01

## 2017-07-22 RX ORDER — IBUPROFEN 600 MG/1
600 TABLET ORAL ONCE
Status: COMPLETED | OUTPATIENT
Start: 2017-07-22 | End: 2017-07-22

## 2017-07-22 RX ORDER — IBUPROFEN 600 MG/1
600 TABLET ORAL EVERY 8 HOURS PRN
Qty: 15 TABLET | Refills: 0 | Status: SHIPPED | OUTPATIENT
Start: 2017-07-22 | End: 2020-10-12 | Stop reason: SDUPTHER

## 2017-07-22 RX ADMIN — IBUPROFEN 600 MG: 600 TABLET, FILM COATED ORAL at 11:38

## 2017-07-24 ENCOUNTER — APPOINTMENT (OUTPATIENT)
Dept: PHYSICAL THERAPY | Facility: CLINIC | Age: 62
End: 2017-07-24
Payer: COMMERCIAL

## 2017-07-24 PROCEDURE — 97112 NEUROMUSCULAR REEDUCATION: CPT

## 2017-07-24 PROCEDURE — 97116 GAIT TRAINING THERAPY: CPT

## 2017-07-24 PROCEDURE — 97110 THERAPEUTIC EXERCISES: CPT

## 2017-07-26 ENCOUNTER — APPOINTMENT (OUTPATIENT)
Dept: PHYSICAL THERAPY | Facility: CLINIC | Age: 62
End: 2017-07-26
Payer: COMMERCIAL

## 2017-07-26 PROCEDURE — 97110 THERAPEUTIC EXERCISES: CPT

## 2017-07-26 PROCEDURE — 97112 NEUROMUSCULAR REEDUCATION: CPT

## 2017-07-26 PROCEDURE — 97164 PT RE-EVAL EST PLAN CARE: CPT

## 2017-07-31 ENCOUNTER — APPOINTMENT (OUTPATIENT)
Dept: PHYSICAL THERAPY | Facility: CLINIC | Age: 62
End: 2017-07-31
Payer: COMMERCIAL

## 2017-07-31 PROCEDURE — 97116 GAIT TRAINING THERAPY: CPT

## 2017-07-31 PROCEDURE — 97110 THERAPEUTIC EXERCISES: CPT

## 2017-08-02 ENCOUNTER — APPOINTMENT (OUTPATIENT)
Dept: PHYSICAL THERAPY | Facility: CLINIC | Age: 62
End: 2017-08-02
Payer: COMMERCIAL

## 2017-08-02 PROCEDURE — 97112 NEUROMUSCULAR REEDUCATION: CPT

## 2017-08-02 PROCEDURE — 97116 GAIT TRAINING THERAPY: CPT

## 2017-08-10 ENCOUNTER — APPOINTMENT (OUTPATIENT)
Dept: PHYSICAL THERAPY | Facility: CLINIC | Age: 62
End: 2017-08-10
Payer: COMMERCIAL

## 2017-08-10 PROCEDURE — 97116 GAIT TRAINING THERAPY: CPT

## 2017-08-10 PROCEDURE — 97112 NEUROMUSCULAR REEDUCATION: CPT

## 2017-08-10 PROCEDURE — 97110 THERAPEUTIC EXERCISES: CPT

## 2017-08-15 ENCOUNTER — APPOINTMENT (OUTPATIENT)
Dept: PHYSICAL THERAPY | Facility: CLINIC | Age: 62
End: 2017-08-15
Payer: COMMERCIAL

## 2017-08-15 PROCEDURE — 97110 THERAPEUTIC EXERCISES: CPT

## 2017-08-15 PROCEDURE — 97112 NEUROMUSCULAR REEDUCATION: CPT

## 2017-08-16 ENCOUNTER — APPOINTMENT (OUTPATIENT)
Dept: PHYSICAL THERAPY | Facility: CLINIC | Age: 62
End: 2017-08-16
Payer: COMMERCIAL

## 2017-08-16 PROCEDURE — 97112 NEUROMUSCULAR REEDUCATION: CPT

## 2017-08-16 PROCEDURE — 97110 THERAPEUTIC EXERCISES: CPT

## 2017-08-16 PROCEDURE — 97116 GAIT TRAINING THERAPY: CPT

## 2017-08-24 ENCOUNTER — APPOINTMENT (OUTPATIENT)
Dept: PHYSICAL THERAPY | Facility: HOME HEALTHCARE | Age: 62
End: 2017-08-24
Payer: COMMERCIAL

## 2017-08-24 PROCEDURE — 97110 THERAPEUTIC EXERCISES: CPT

## 2017-08-28 ENCOUNTER — APPOINTMENT (OUTPATIENT)
Dept: PHYSICAL THERAPY | Facility: CLINIC | Age: 62
End: 2017-08-28
Payer: COMMERCIAL

## 2017-08-28 PROCEDURE — 97164 PT RE-EVAL EST PLAN CARE: CPT

## 2017-08-28 PROCEDURE — 97112 NEUROMUSCULAR REEDUCATION: CPT

## 2017-08-28 PROCEDURE — 97116 GAIT TRAINING THERAPY: CPT

## 2017-08-29 ENCOUNTER — APPOINTMENT (OUTPATIENT)
Dept: PHYSICAL THERAPY | Facility: CLINIC | Age: 62
End: 2017-08-29
Payer: COMMERCIAL

## 2017-08-29 PROCEDURE — 97110 THERAPEUTIC EXERCISES: CPT

## 2017-08-29 PROCEDURE — 97116 GAIT TRAINING THERAPY: CPT

## 2017-09-05 ENCOUNTER — APPOINTMENT (OUTPATIENT)
Dept: PHYSICAL THERAPY | Facility: CLINIC | Age: 62
End: 2017-09-05
Payer: COMMERCIAL

## 2017-09-05 PROCEDURE — 97110 THERAPEUTIC EXERCISES: CPT

## 2017-09-05 PROCEDURE — 97112 NEUROMUSCULAR REEDUCATION: CPT

## 2017-09-11 ENCOUNTER — APPOINTMENT (OUTPATIENT)
Dept: PHYSICAL THERAPY | Facility: CLINIC | Age: 62
End: 2017-09-11
Payer: COMMERCIAL

## 2017-09-11 PROCEDURE — 97110 THERAPEUTIC EXERCISES: CPT

## 2017-09-11 PROCEDURE — 97112 NEUROMUSCULAR REEDUCATION: CPT

## 2017-09-12 ENCOUNTER — APPOINTMENT (OUTPATIENT)
Dept: PHYSICAL THERAPY | Facility: CLINIC | Age: 62
End: 2017-09-12
Payer: COMMERCIAL

## 2017-09-13 ENCOUNTER — APPOINTMENT (OUTPATIENT)
Dept: PHYSICAL THERAPY | Facility: CLINIC | Age: 62
End: 2017-09-13
Payer: COMMERCIAL

## 2017-09-18 ENCOUNTER — APPOINTMENT (OUTPATIENT)
Dept: PHYSICAL THERAPY | Facility: CLINIC | Age: 62
End: 2017-09-18
Payer: COMMERCIAL

## 2017-09-18 PROCEDURE — 97112 NEUROMUSCULAR REEDUCATION: CPT

## 2017-09-18 PROCEDURE — 97116 GAIT TRAINING THERAPY: CPT

## 2017-09-18 PROCEDURE — 97110 THERAPEUTIC EXERCISES: CPT

## 2017-09-19 ENCOUNTER — TRANSCRIBE ORDERS (OUTPATIENT)
Dept: ADMINISTRATIVE | Facility: HOSPITAL | Age: 62
End: 2017-09-19

## 2017-09-19 ENCOUNTER — APPOINTMENT (OUTPATIENT)
Dept: PHYSICAL THERAPY | Facility: CLINIC | Age: 62
End: 2017-09-19
Payer: COMMERCIAL

## 2017-09-19 ENCOUNTER — APPOINTMENT (OUTPATIENT)
Dept: LAB | Facility: CLINIC | Age: 62
End: 2017-09-19
Payer: COMMERCIAL

## 2017-09-19 DIAGNOSIS — E55.9 VITAMIN D DEFICIENCY: ICD-10-CM

## 2017-09-19 DIAGNOSIS — E78.5 HYPERLIPIDEMIA, UNSPECIFIED HYPERLIPIDEMIA TYPE: ICD-10-CM

## 2017-09-19 DIAGNOSIS — Z00.00 WELL ADULT EXAM: ICD-10-CM

## 2017-09-19 DIAGNOSIS — I11.0 HYPERTENSIVE CHF (HCC): Primary | ICD-10-CM

## 2017-09-19 DIAGNOSIS — I11.0 HYPERTENSIVE CHF (HCC): ICD-10-CM

## 2017-09-19 LAB
25(OH)D3 SERPL-MCNC: 56.4 NG/ML (ref 30–100)
ALBUMIN SERPL BCP-MCNC: 3.6 G/DL (ref 3.5–5)
ALP SERPL-CCNC: 140 U/L (ref 46–116)
ALT SERPL W P-5'-P-CCNC: 57 U/L (ref 12–78)
ANION GAP SERPL CALCULATED.3IONS-SCNC: 7 MMOL/L (ref 4–13)
AST SERPL W P-5'-P-CCNC: 47 U/L (ref 5–45)
BASOPHILS # BLD AUTO: 0.02 THOUSANDS/ΜL (ref 0–0.1)
BASOPHILS NFR BLD AUTO: 0 % (ref 0–1)
BILIRUB SERPL-MCNC: 0.61 MG/DL (ref 0.2–1)
BUN SERPL-MCNC: 19 MG/DL (ref 5–25)
CALCIUM SERPL-MCNC: 9.6 MG/DL (ref 8.3–10.1)
CHLORIDE SERPL-SCNC: 104 MMOL/L (ref 100–108)
CHOLEST SERPL-MCNC: 103 MG/DL (ref 50–200)
CO2 SERPL-SCNC: 28 MMOL/L (ref 21–32)
CREAT SERPL-MCNC: 0.67 MG/DL (ref 0.6–1.3)
EOSINOPHIL # BLD AUTO: 0.05 THOUSAND/ΜL (ref 0–0.61)
EOSINOPHIL NFR BLD AUTO: 1 % (ref 0–6)
ERYTHROCYTE [DISTWIDTH] IN BLOOD BY AUTOMATED COUNT: 12.7 % (ref 11.6–15.1)
GFR SERPL CREATININE-BSD FRML MDRD: 95 ML/MIN/1.73SQ M
GLUCOSE P FAST SERPL-MCNC: 80 MG/DL (ref 65–99)
HCT VFR BLD AUTO: 44.8 % (ref 34.8–46.1)
HDLC SERPL-MCNC: 60 MG/DL (ref 40–60)
HGB BLD-MCNC: 15 G/DL (ref 11.5–15.4)
LDLC SERPL CALC-MCNC: 26 MG/DL (ref 0–100)
LYMPHOCYTES # BLD AUTO: 2.8 THOUSANDS/ΜL (ref 0.6–4.47)
LYMPHOCYTES NFR BLD AUTO: 51 % (ref 14–44)
MCH RBC QN AUTO: 31.1 PG (ref 26.8–34.3)
MCHC RBC AUTO-ENTMCNC: 33.5 G/DL (ref 31.4–37.4)
MCV RBC AUTO: 93 FL (ref 82–98)
MONOCYTES # BLD AUTO: 0.35 THOUSAND/ΜL (ref 0.17–1.22)
MONOCYTES NFR BLD AUTO: 6 % (ref 4–12)
NEUTROPHILS # BLD AUTO: 2.3 THOUSANDS/ΜL (ref 1.85–7.62)
NEUTS SEG NFR BLD AUTO: 42 % (ref 43–75)
NRBC BLD AUTO-RTO: 0 /100 WBCS
PLATELET # BLD AUTO: 301 THOUSANDS/UL (ref 149–390)
PMV BLD AUTO: 10.3 FL (ref 8.9–12.7)
POTASSIUM SERPL-SCNC: 4.1 MMOL/L (ref 3.5–5.3)
PROT SERPL-MCNC: 7.2 G/DL (ref 6.4–8.2)
RBC # BLD AUTO: 4.82 MILLION/UL (ref 3.81–5.12)
SODIUM SERPL-SCNC: 139 MMOL/L (ref 136–145)
TRIGL SERPL-MCNC: 83 MG/DL
TSH SERPL DL<=0.05 MIU/L-ACNC: 2.6 UIU/ML (ref 0.36–3.74)
WBC # BLD AUTO: 5.52 THOUSAND/UL (ref 4.31–10.16)

## 2017-09-19 PROCEDURE — 80061 LIPID PANEL: CPT

## 2017-09-19 PROCEDURE — 97112 NEUROMUSCULAR REEDUCATION: CPT

## 2017-09-19 PROCEDURE — 80053 COMPREHEN METABOLIC PANEL: CPT

## 2017-09-19 PROCEDURE — 84443 ASSAY THYROID STIM HORMONE: CPT

## 2017-09-19 PROCEDURE — 36415 COLL VENOUS BLD VENIPUNCTURE: CPT

## 2017-09-19 PROCEDURE — 97110 THERAPEUTIC EXERCISES: CPT

## 2017-09-19 PROCEDURE — 85025 COMPLETE CBC W/AUTO DIFF WBC: CPT

## 2017-09-19 PROCEDURE — 82306 VITAMIN D 25 HYDROXY: CPT

## 2017-09-21 ENCOUNTER — APPOINTMENT (OUTPATIENT)
Dept: PHYSICAL THERAPY | Facility: CLINIC | Age: 62
End: 2017-09-21
Payer: COMMERCIAL

## 2017-09-25 ENCOUNTER — APPOINTMENT (OUTPATIENT)
Dept: PHYSICAL THERAPY | Facility: CLINIC | Age: 62
End: 2017-09-25
Payer: COMMERCIAL

## 2017-09-25 PROCEDURE — 97116 GAIT TRAINING THERAPY: CPT

## 2017-09-25 PROCEDURE — 97112 NEUROMUSCULAR REEDUCATION: CPT

## 2017-09-27 ENCOUNTER — APPOINTMENT (OUTPATIENT)
Dept: PHYSICAL THERAPY | Facility: CLINIC | Age: 62
End: 2017-09-27
Payer: COMMERCIAL

## 2017-09-28 ENCOUNTER — APPOINTMENT (OUTPATIENT)
Dept: PHYSICAL THERAPY | Facility: CLINIC | Age: 62
End: 2017-09-28
Payer: COMMERCIAL

## 2017-09-28 PROCEDURE — 97112 NEUROMUSCULAR REEDUCATION: CPT

## 2017-09-28 PROCEDURE — 97110 THERAPEUTIC EXERCISES: CPT

## 2017-09-29 ENCOUNTER — APPOINTMENT (OUTPATIENT)
Dept: PHYSICAL THERAPY | Facility: CLINIC | Age: 62
End: 2017-09-29
Payer: COMMERCIAL

## 2017-10-02 ENCOUNTER — APPOINTMENT (OUTPATIENT)
Dept: PHYSICAL THERAPY | Facility: CLINIC | Age: 62
End: 2017-10-02
Payer: COMMERCIAL

## 2017-10-02 PROCEDURE — 97116 GAIT TRAINING THERAPY: CPT

## 2017-10-02 PROCEDURE — 97112 NEUROMUSCULAR REEDUCATION: CPT

## 2017-10-02 PROCEDURE — 97164 PT RE-EVAL EST PLAN CARE: CPT

## 2017-10-05 ENCOUNTER — APPOINTMENT (OUTPATIENT)
Dept: PHYSICAL THERAPY | Facility: CLINIC | Age: 62
End: 2017-10-05
Payer: COMMERCIAL

## 2017-10-10 ENCOUNTER — APPOINTMENT (OUTPATIENT)
Dept: PHYSICAL THERAPY | Facility: CLINIC | Age: 62
End: 2017-10-10
Payer: COMMERCIAL

## 2017-10-10 PROCEDURE — 97110 THERAPEUTIC EXERCISES: CPT

## 2017-10-10 PROCEDURE — 97112 NEUROMUSCULAR REEDUCATION: CPT

## 2017-10-12 ENCOUNTER — APPOINTMENT (OUTPATIENT)
Dept: PHYSICAL THERAPY | Facility: CLINIC | Age: 62
End: 2017-10-12
Payer: COMMERCIAL

## 2017-10-12 PROCEDURE — 97110 THERAPEUTIC EXERCISES: CPT

## 2017-10-12 PROCEDURE — 97112 NEUROMUSCULAR REEDUCATION: CPT

## 2017-10-17 ENCOUNTER — APPOINTMENT (OUTPATIENT)
Dept: PHYSICAL THERAPY | Facility: CLINIC | Age: 62
End: 2017-10-17
Payer: COMMERCIAL

## 2017-10-17 PROCEDURE — 97112 NEUROMUSCULAR REEDUCATION: CPT

## 2017-10-17 PROCEDURE — 97110 THERAPEUTIC EXERCISES: CPT

## 2017-10-19 ENCOUNTER — APPOINTMENT (OUTPATIENT)
Dept: PHYSICAL THERAPY | Facility: CLINIC | Age: 62
End: 2017-10-19
Payer: COMMERCIAL

## 2017-10-19 PROCEDURE — 97112 NEUROMUSCULAR REEDUCATION: CPT

## 2017-10-19 PROCEDURE — 97110 THERAPEUTIC EXERCISES: CPT

## 2017-10-24 ENCOUNTER — APPOINTMENT (OUTPATIENT)
Dept: PHYSICAL THERAPY | Facility: CLINIC | Age: 62
End: 2017-10-24
Payer: COMMERCIAL

## 2017-10-24 PROCEDURE — 97110 THERAPEUTIC EXERCISES: CPT

## 2017-10-24 PROCEDURE — 97112 NEUROMUSCULAR REEDUCATION: CPT

## 2017-10-26 ENCOUNTER — APPOINTMENT (OUTPATIENT)
Dept: PHYSICAL THERAPY | Facility: CLINIC | Age: 62
End: 2017-10-26
Payer: COMMERCIAL

## 2017-10-26 PROCEDURE — 97110 THERAPEUTIC EXERCISES: CPT

## 2017-10-26 PROCEDURE — 97140 MANUAL THERAPY 1/> REGIONS: CPT

## 2017-10-26 PROCEDURE — 97112 NEUROMUSCULAR REEDUCATION: CPT

## 2017-10-31 ENCOUNTER — APPOINTMENT (OUTPATIENT)
Dept: PHYSICAL THERAPY | Facility: CLINIC | Age: 62
End: 2017-10-31
Payer: COMMERCIAL

## 2017-10-31 PROCEDURE — 97110 THERAPEUTIC EXERCISES: CPT

## 2017-10-31 PROCEDURE — 97112 NEUROMUSCULAR REEDUCATION: CPT

## 2017-11-02 ENCOUNTER — APPOINTMENT (OUTPATIENT)
Dept: PHYSICAL THERAPY | Facility: CLINIC | Age: 62
End: 2017-11-02
Payer: COMMERCIAL

## 2017-11-02 PROCEDURE — G8979 MOBILITY GOAL STATUS: HCPCS

## 2017-11-02 PROCEDURE — 97164 PT RE-EVAL EST PLAN CARE: CPT

## 2017-11-02 PROCEDURE — G8978 MOBILITY CURRENT STATUS: HCPCS

## 2017-11-02 PROCEDURE — 97112 NEUROMUSCULAR REEDUCATION: CPT

## 2017-11-02 PROCEDURE — 97535 SELF CARE MNGMENT TRAINING: CPT

## 2017-11-09 ENCOUNTER — APPOINTMENT (OUTPATIENT)
Dept: PHYSICAL THERAPY | Facility: CLINIC | Age: 62
End: 2017-11-09
Payer: COMMERCIAL

## 2017-11-09 PROCEDURE — 97112 NEUROMUSCULAR REEDUCATION: CPT

## 2017-11-09 PROCEDURE — 97110 THERAPEUTIC EXERCISES: CPT

## 2017-11-14 ENCOUNTER — APPOINTMENT (OUTPATIENT)
Dept: PHYSICAL THERAPY | Facility: CLINIC | Age: 62
End: 2017-11-14
Payer: COMMERCIAL

## 2017-11-14 PROCEDURE — 97535 SELF CARE MNGMENT TRAINING: CPT

## 2017-11-16 ENCOUNTER — APPOINTMENT (OUTPATIENT)
Dept: PHYSICAL THERAPY | Facility: CLINIC | Age: 62
End: 2017-11-16
Payer: COMMERCIAL

## 2017-11-16 PROCEDURE — 97112 NEUROMUSCULAR REEDUCATION: CPT

## 2017-11-16 PROCEDURE — 97110 THERAPEUTIC EXERCISES: CPT

## 2017-11-21 ENCOUNTER — APPOINTMENT (OUTPATIENT)
Dept: PHYSICAL THERAPY | Facility: CLINIC | Age: 62
End: 2017-11-21
Payer: COMMERCIAL

## 2017-11-21 PROCEDURE — 97110 THERAPEUTIC EXERCISES: CPT

## 2017-11-21 PROCEDURE — 97112 NEUROMUSCULAR REEDUCATION: CPT

## 2017-12-05 ENCOUNTER — APPOINTMENT (OUTPATIENT)
Dept: PHYSICAL THERAPY | Facility: CLINIC | Age: 62
End: 2017-12-05
Payer: COMMERCIAL

## 2017-12-05 PROCEDURE — 97112 NEUROMUSCULAR REEDUCATION: CPT

## 2017-12-05 PROCEDURE — 97164 PT RE-EVAL EST PLAN CARE: CPT

## 2017-12-05 PROCEDURE — 97110 THERAPEUTIC EXERCISES: CPT

## 2017-12-05 PROCEDURE — G8978 MOBILITY CURRENT STATUS: HCPCS

## 2017-12-05 PROCEDURE — G8979 MOBILITY GOAL STATUS: HCPCS

## 2017-12-08 ENCOUNTER — APPOINTMENT (OUTPATIENT)
Dept: PHYSICAL THERAPY | Facility: CLINIC | Age: 62
End: 2017-12-08
Payer: COMMERCIAL

## 2017-12-08 PROCEDURE — 97116 GAIT TRAINING THERAPY: CPT

## 2017-12-08 PROCEDURE — 97112 NEUROMUSCULAR REEDUCATION: CPT

## 2017-12-08 PROCEDURE — 97110 THERAPEUTIC EXERCISES: CPT

## 2017-12-12 ENCOUNTER — APPOINTMENT (OUTPATIENT)
Dept: PHYSICAL THERAPY | Facility: CLINIC | Age: 62
End: 2017-12-12
Payer: COMMERCIAL

## 2017-12-12 PROCEDURE — 97112 NEUROMUSCULAR REEDUCATION: CPT

## 2017-12-12 PROCEDURE — 97110 THERAPEUTIC EXERCISES: CPT

## 2017-12-19 ENCOUNTER — APPOINTMENT (OUTPATIENT)
Dept: PHYSICAL THERAPY | Facility: CLINIC | Age: 62
End: 2017-12-19
Payer: COMMERCIAL

## 2017-12-19 PROCEDURE — 97112 NEUROMUSCULAR REEDUCATION: CPT

## 2017-12-19 PROCEDURE — 97110 THERAPEUTIC EXERCISES: CPT

## 2018-01-02 ENCOUNTER — APPOINTMENT (OUTPATIENT)
Dept: PHYSICAL THERAPY | Facility: CLINIC | Age: 63
End: 2018-01-02
Payer: COMMERCIAL

## 2018-01-02 PROCEDURE — 97110 THERAPEUTIC EXERCISES: CPT

## 2018-01-09 ENCOUNTER — APPOINTMENT (OUTPATIENT)
Dept: PHYSICAL THERAPY | Facility: CLINIC | Age: 63
End: 2018-01-09
Payer: COMMERCIAL

## 2018-01-09 PROCEDURE — 97112 NEUROMUSCULAR REEDUCATION: CPT

## 2018-01-09 PROCEDURE — 97110 THERAPEUTIC EXERCISES: CPT

## 2018-01-11 ENCOUNTER — APPOINTMENT (OUTPATIENT)
Dept: PHYSICAL THERAPY | Facility: CLINIC | Age: 63
End: 2018-01-11
Payer: COMMERCIAL

## 2018-01-11 PROCEDURE — 97112 NEUROMUSCULAR REEDUCATION: CPT

## 2018-01-11 PROCEDURE — 97110 THERAPEUTIC EXERCISES: CPT

## 2018-01-12 NOTE — MISCELLANEOUS
Message   Recorded as Task   Date: 07/11/2016 04:21 PM, Created By: Jazmín Lui   Task Name: Review Document   Assigned To: vascular call center,Team   Regarding Patient: Lamar Colon, Status: In Progress   Comment:    Jazmín Lui - 11 Jul 2016 4:21 PM     TASK CREATED  Please see hosp  discharge from 7/11/16 for f/u   Karma Colby - 13 Jul 2016 10:00 AM     TASK EDITED  Left the scheduling  number on the vm, would not accept an actual vm   Karma Colby - 13 Jul 2016 10:00 AM     TASK IN PROGRESS   Pau Naranjo - 19 Jul 2016 3:51 PM     TASK EDITED                 called pt to schedule unable to lm x2   Darcie Sierra - 25 Jul 2016 11:46 AM     TASK EDITED  called pt to sched ov/mailbox is full cannot leave a vm message   tried to contact this patient several times  vm full could not leave message      Active Problems    1  Knee pain (719 46) (M25 569)   2  Medial meniscus tear (836 0) (S83 249A)    Allergies    1   No Known Drug Allergies    Signatures   Electronically signed by : Levon Wylie, ; Jul 25 2016 11:48AM EST                       (Author)

## 2018-01-13 VITALS — DIASTOLIC BLOOD PRESSURE: 75 MMHG | RESPIRATION RATE: 18 BRPM | SYSTOLIC BLOOD PRESSURE: 111 MMHG | HEART RATE: 69 BPM

## 2018-01-14 NOTE — PROCEDURES
Procedures by Autumn Crump MD at 6/1/2016   1:54 PM      Author:  Autumn Crump MD Service:  Cardiology Author Type:  Physician    Filed:  6/1/2016  1:58 PM Date of Service:  6/1/2016  1:54 PM Status:  Attested    :  Autumn Crump MD (Physician)  Cosigner:  Tiffany Trevino MD at 6/23/2016  4:40 PM      Pre-procedure Diagnoses:       1  Stroke [I63 9]                Post-procedure Diagnoses:       1  PFO (patent foramen ovale) [Q21 1]                Procedures:       1  TANMAY [MHI05 (Custom)]              Attestation signed by Tiffany Trevino MD at 6/23/2016  4:40 PM                  Teaching Physician Statement  I performed history and exam of patient  I discussed with the Resident  I agree with the resident's documented findings and plan of care  I was present for the entire procedure and agree with the resident                                             Intubation easy, 2 attempts    IV Propofol 180 mg, IV ketamine 20 mg and IV lidocaine 60 mg used  for sedation by anesthesia    Findings  Normal LV size and function  Small PFO with redundant atrial septum  No obvious source of cardiac embolism    No immediate complications  No blood on probe           Received for:Torin Ruiz MD  Jun 23 2016  4:40PM Eastern Standard Time

## 2018-01-15 VITALS
WEIGHT: 142 LBS | DIASTOLIC BLOOD PRESSURE: 71 MMHG | HEART RATE: 74 BPM | SYSTOLIC BLOOD PRESSURE: 103 MMHG | BODY MASS INDEX: 23.63 KG/M2

## 2018-01-15 VITALS — RESPIRATION RATE: 18 BRPM | DIASTOLIC BLOOD PRESSURE: 76 MMHG | HEART RATE: 103 BPM | SYSTOLIC BLOOD PRESSURE: 110 MMHG

## 2018-01-16 ENCOUNTER — APPOINTMENT (OUTPATIENT)
Dept: PHYSICAL THERAPY | Facility: CLINIC | Age: 63
End: 2018-01-16
Payer: COMMERCIAL

## 2018-01-16 NOTE — PROCEDURES
Procedures by Hari Cheatham RN at  5/23/2016  9:41 AM      Author:  Hari Cheatham RN Service:  (none) Author Type:  Registered Nurse     Filed:  5/23/2016  9:48 AM Date of Service:  5/23/2016  9:41 AM Status:  Signed     :  Hari Cheatham RN (Registered Nurse)         Procedure Orders:       1  Insert PICC line [[id number]] ordered by Kristina Rogel MD at 05/22/16 0503                    Insert PICC line  Date/Time: 5/23/2016 9:42 AM  Performed by: HEATH POSADA  Authorized by: Kevin Topete     Patient location:  Bedside  Procedure performed by consultant:  Jamey Frances  Consent:     Consent obtained:  Verbal (by physician)    Consent given by:  Keyanna Martinez protocol:     Procedure explained and questions answered to patient or proxy's satisfaction: yes      Relevant documents present and verified: yes      Test results available and properly labeled: yes       Imaging studies available: yes      Required blood products, implants, devices, and special equipment available: yes      Site/side marked: yes      Patient identity confirmed:  Arm band  Pre-procedure details:     Hand hygiene: Hand hygiene performed prior to insertion      Sterile barrier technique: All elements of maximal sterile technique followed      Skin preparation:  ChloraPrep    Skin preparation agent: Skin preparation agent completely dried prior to procedure    Indications:     PICC line indications: vascular access    Anesthesia (see MAR for exact dosages):      Anesthesia method:  Local infiltration    Local anesthetic:  Lidocaine 1% w/o epi  Procedure details:     Location:  Basilic    Vessel type: vein      Laterality:  Right    Approach: percutaneous technique used      Patient position:  Flat    Procedural supplies:  Double lumen    Catheter size:  5 Fr    Landmarks identified: yes      Ultrasound guidance: yes      Sterile ultrasound techniques: Sterile gel and sterile probe covers were used      Successful placement: yes      Vessel of catheter tip end:  SVC    Total catheter length (cm):  42    Catheter out on skin (cm):  1    Max flow rate:  999ml/hour    Arm circumference:  34  Post-procedure details:     Post-procedure:  Dressing applied    Assessment:  Blood return through all ports and free fluid flow    Post-procedure complications: none      Patient tolerance of procedure:   Tolerated with difficulty  Comments:      Confirmed Lorenza 3CG                     Received for:Provider  EPIC   May 23 2016 10:53AM [facility]

## 2018-01-17 ENCOUNTER — APPOINTMENT (EMERGENCY)
Dept: RADIOLOGY | Facility: HOSPITAL | Age: 63
End: 2018-01-17
Payer: COMMERCIAL

## 2018-01-17 ENCOUNTER — HOSPITAL ENCOUNTER (EMERGENCY)
Facility: HOSPITAL | Age: 63
Discharge: HOME | End: 2018-01-17
Payer: COMMERCIAL

## 2018-01-17 ENCOUNTER — APPOINTMENT (EMERGENCY)
Dept: ULTRASOUND IMAGING | Facility: HOSPITAL | Age: 63
End: 2018-01-17
Payer: COMMERCIAL

## 2018-01-17 VITALS
DIASTOLIC BLOOD PRESSURE: 67 MMHG | BODY MASS INDEX: 21.48 KG/M2 | SYSTOLIC BLOOD PRESSURE: 119 MMHG | OXYGEN SATURATION: 98 % | HEART RATE: 70 BPM | WEIGHT: 145 LBS | RESPIRATION RATE: 16 BRPM | HEIGHT: 69 IN | TEMPERATURE: 98.2 F

## 2018-01-17 DIAGNOSIS — M79.89 RIGHT LEG SWELLING: Primary | ICD-10-CM

## 2018-01-17 PROCEDURE — 99283 EMERGENCY DEPT VISIT LOW MDM: CPT

## 2018-01-17 PROCEDURE — 73564 X-RAY EXAM KNEE 4 OR MORE: CPT

## 2018-01-17 PROCEDURE — 93971 EXTREMITY STUDY: CPT

## 2018-01-17 RX ORDER — NAPROXEN 500 MG/1
500 TABLET ORAL 2 TIMES DAILY WITH MEALS
Qty: 14 TABLET | Refills: 0 | Status: SHIPPED | OUTPATIENT
Start: 2018-01-17 | End: 2019-02-23

## 2018-01-17 NOTE — ED PROVIDER NOTES
History  Chief Complaint   Patient presents with    Leg Pain     Son states that patient started complaing of burning and pain in lower right leg radiating up towards thigh  Patient is non verbal due to a stroke     Patient presents to the emergency department today offering a chief complaint of right-sided leg pain and swelling  History is mostly provided by the patient's son  Patient had a stroke 2 years ago and does not communicate verbally  There was some notation of some redness of the medial right ankle yesterday however that has resolved  Patient points to the right knee it region when asked where the pain is  Patient is able to shake her head yes and no when asked questions  She denies chest pain or shortness of breath  Hx of dvt in the right leg 2 years ago  Patient does not utilize the right leg due to paralysis secondary to stroke  Prior to Admission Medications   Prescriptions Last Dose Informant Patient Reported? Taking?   atorvastatin (LIPITOR) 40 mg tablet   No No   Sig: Take 1 tablet by mouth daily with dinner for 30 days   cholecalciferol (VITAMIN D3) 1,000 units tablet   Yes No   Sig: Take 1,000 Units by mouth daily   ibuprofen (MOTRIN) 600 mg tablet   No No   Sig: Take 1 tablet by mouth every 8 (eight) hours as needed for mild pain or moderate pain for up to 5 days   potassium chloride (K-DUR,KLOR-CON) 20 mEq tablet   No No   Sig: Take 1 tablet by mouth daily for 30 days      Facility-Administered Medications: None       Past Medical History:   Diagnosis Date    CVA, old, aphasia     Medial meniscus tear     left    Mitral valve prolapse     [facility]roke [facility])        Past Surgical History:   Procedure Laterality Date     SECTION      FRACTURE SURGERY      HYSTERECTOMY      [place] OPEN RX FEMUR FX+INTRAMED ALAYNA Left 2017    Procedure: INSERTION NAIL IM FEMUR ANTEGRADE (TROCHANTERIC);   Surgeon: Cathleen Carmona MD;  [facility];  Service: Orthopedics    TONSILLECTOMY         Family History   Problem Relation Age of Onset    Arthritis Mother     Heart disease Father     Heart attack Father      I have reviewed and agree with the history as documented  Social History   Substance Use Topics    Smoking status: Never Smoker    Smokeless tobacco: Never Used    Alcohol use No        Review of Systems   Constitutional: Negative  HENT: Negative  Eyes: Negative  Respiratory: Negative  Cardiovascular: Negative  Gastrointestinal: Negative  Endocrine: Negative  Genitourinary: Negative  Musculoskeletal:        Right leg pain   Skin: Negative  Allergic/Immunologic: Negative  Neurological: Negative  Hematological: Negative  Psychiatric/Behavioral: Negative  All other systems reviewed and are negative  Physical Exam  ED Triage Vitals   Temperature Pulse Respirations Blood Pressure SpO2   01/17/18 1627 01/17/18 1628 01/17/18 1628 01/17/18 1628 01/17/18 1628   98 2 °F (36 8 °C) 73 18 113/69 97 %      Temp Source Heart Rate Source Patient Position - Orthostatic VS BP Location FiO2 (%)   01/17/18 1627 01/17/18 1628 01/17/18 1628 01/17/18 1628 --   Temporal Monitor Sitting Left arm       Pain Score       01/17/18 1628       7           Orthostatic Vital Signs  Vitals:    01/17/18 1628   BP: 113/69   Pulse: 73   Patient Position - Orthostatic VS: Sitting       Physical Exam   Constitutional: She appears well-developed and well-nourished  No distress  HENT:   Head: Normocephalic  Right Ear: External ear normal    Left Ear: External ear normal    Nose: Nose normal    Mouth/Throat: Oropharynx is clear and moist    Eyes: Pupils are equal, round, and reactive to light  Neck: Normal range of motion  Cardiovascular: Normal rate  Pulmonary/Chest: Effort normal    Abdominal: Soft  Musculoskeletal:   Patient does have slight swelling which appears nontraumatic of the right calf region  There is no redness warmth or heat  Neurological: She is alert  Skin: Skin is warm  Capillary refill takes less than 2 seconds  She is not diaphoretic  Psychiatric: She has a normal mood and affect  Vitals reviewed  ED Medications  Medications - No data to display    Diagnostic Studies  Results Reviewed     None                 XR knee 4+ vw right injury   Final Result by Sigifredo Hannon MD (01/17 1742)      No acute osseous abnormality  Workstation performed: [id number]         VAS lower limb venous duplex study, unilateral/limited    (Results Pending)              Procedures  Procedures       Phone Contacts  ED Phone Contact    ED Course  ED Course as of Jan 17 1907 Wed Jan 17, 2018   1708 Blood Pressure: 113/69   1708 Temperature: 98 2 °F (36 8 °C)   1708 Pulse: 73   1708 Respirations: 18   1708 SpO2: 97 %   1858 Narrative     RIGHT KNEE    INDICATION:  Right knee pain  COMPARISON: 7/22/2017  VIEWS:  4    IMAGES:  5    FINDINGS:    There is no acute fracture or dislocation  Diffuse osteopenia  There is no joint effusion  Mild osteoarthritic degenerative changes  No lytic or blastic lesions are seen  Soft tissues are unremarkable  Impression       No acute osseous abnormality  1858 Per ultrasound tech no evidence of DVT                                MDM  CritCare Time    Disposition  Final diagnoses:   Right leg swelling     Time reflects when diagnosis was documented in both MDM as applicable and the Disposition within this note     Time User Action Codes Description Comment    1/17/2018  7:03 PM Thalia Hayward Add [M79 89] Right leg swelling       ED Disposition     ED Disposition Condition Comment    Discharge  Reginaldo Bone discharge to home/self care      Condition at discharge: Good        Follow-up Information     Follow up With Specialties Details Why [facility],  Family Medicine Schedule an appointment as soon as possible for a visit  [address] [place] [address] [place] [address]  [phone]          Patient's Medications   Discharge Prescriptions    NAPROXEN (EC NAPROSYN) 500 MG EC TABLET    Take 1 tablet by mouth 2 (two) times a day with meals       Start Date: 1/17/2018 End Date: --       Order Dose: 500 mg       Quantity: 14 tablet    Refills: 0     No discharge procedures on file      ED Provider  Electronically Signed by           Varsha Trammell PA-C  01/17/18 1600 [address] Diaz PA-C  01/17/18 8020

## 2018-01-18 NOTE — DISCHARGE INSTRUCTIONS
Leg Pain   WHAT YOU NEED TO KNOW:   Leg pain may be caused by a variety of health conditions  Your tests did not show any broken bones or blood clots  DISCHARGE INSTRUCTIONS:   Return to the emergency department if:   · You have a fever  · Your leg starts to swell  · Your leg pain gets worse  · You have numbness or tingling in your leg or toes  · You cannot put any weight on or move your leg  Contact your healthcare provider if:   · Your pain does not decrease, even after treatment  · You have questions or concerns about your condition or care  Medicines:   · NSAIDs , such as ibuprofen, help decrease swelling, pain, and fever  This medicine is available with or without a doctor's order  NSAIDs can cause stomach bleeding or kidney problems in certain people  If you take blood thinner medicine, always ask your healthcare provider if NSAIDs are safe for you  Always read the medicine label and follow directions  · Take your medicine as directed  Contact your healthcare provider if you think your medicine is not helping or if you have side effects  Tell him of her if you are allergic to any medicine  Keep a list of the medicines, vitamins, and herbs you take  Include the amounts, and when and why you take them  Bring the list or the pill bottles to follow-up visits  Carry your medicine list with you in case of an emergency  Follow up with your healthcare provider as directed: You may need more tests to find the cause of your leg pain  You may need to see an orthopedic specialist or a physical therapist  Write down your questions so you remember to ask them during your visits  Manage your leg pain:   · Rest  your injured leg so that it can heal  You may need an immobilizer, brace, or splint to limit the movement of your leg  You may need to avoid putting any weight on your leg for at least 48 hours  Return to normal activities as directed      · Ice  the injury for 20 minutes every 4 hours for up to 24 hours, or as directed  Use an ice pack, or put crushed ice in a plastic bag  Cover it with a towel to protect your skin  Ice helps prevent tissue damage and decreases swelling and pain  · Elevate  your injured leg above the level of your heart as often as you can  This will help decrease swelling and pain  If possible, prop your leg on pillows or blankets to keep the area elevated comfortably  · Use assistive devices as directed  You may need to use a cane or crutches  Assistive devices help decrease pain and pressure on your leg when you walk  Ask your healthcare provider for more information about assistive devices and how to use them correctly  · Maintain a healthy weight  Extra body weight can cause pressure and pain in your hip, knee, and ankle joints  Ask your healthcare provider how much you should weigh  Ask him to help you create a weight loss plan if you are overweight  © 2017 [address] [employer] Information is for End User's use only and may not be sold, redistributed or otherwise used for commercial purposes  All illustrations and images included in CareNotes® are the copyrighted property of [employer] , [employer]  or Segundo Meyer  The above information is an  only  It is not intended as medical advice for individual conditions or treatments  Talk to your doctor, nurse or pharmacist before following any medical regimen to see if it is safe and effective for you

## 2018-01-23 ENCOUNTER — APPOINTMENT (OUTPATIENT)
Dept: SPEECH THERAPY | Facility: CLINIC | Age: 63
End: 2018-01-23
Payer: COMMERCIAL

## 2018-01-23 ENCOUNTER — APPOINTMENT (OUTPATIENT)
Dept: PHYSICAL THERAPY | Facility: CLINIC | Age: 63
End: 2018-01-23
Payer: COMMERCIAL

## 2018-01-23 PROCEDURE — 92523 SPEECH SOUND LANG COMPREHEN: CPT

## 2018-01-23 PROCEDURE — 97110 THERAPEUTIC EXERCISES: CPT

## 2018-01-23 PROCEDURE — G9163 LANG EXPRESS GOAL STATUS: HCPCS

## 2018-01-23 PROCEDURE — G9162 LANG EXPRESS CURRENT STATUS: HCPCS

## 2018-01-23 PROCEDURE — 97112 NEUROMUSCULAR REEDUCATION: CPT

## 2018-01-25 ENCOUNTER — TRANSCRIBE ORDERS (OUTPATIENT)
Dept: LAB | Facility: CLINIC | Age: 63
End: 2018-01-25

## 2018-01-25 ENCOUNTER — APPOINTMENT (OUTPATIENT)
Dept: LAB | Facility: CLINIC | Age: 63
End: 2018-01-25
Payer: COMMERCIAL

## 2018-01-25 ENCOUNTER — OFFICE VISIT (OUTPATIENT)
Dept: PHYSICAL THERAPY | Facility: CLINIC | Age: 63
End: 2018-01-25
Payer: COMMERCIAL

## 2018-01-25 DIAGNOSIS — G81.02 FLACCID HEMIPLEGIA OF LEFT DOMINANT SIDE DUE TO OTHER CEREBROVASCULAR DISEASE: ICD-10-CM

## 2018-01-25 DIAGNOSIS — E78.5 HYPERLIPIDEMIA, UNSPECIFIED HYPERLIPIDEMIA TYPE: Primary | ICD-10-CM

## 2018-01-25 DIAGNOSIS — E55.9 VITAMIN D DEFICIENCY: ICD-10-CM

## 2018-01-25 DIAGNOSIS — E78.5 HYPERLIPIDEMIA, UNSPECIFIED HYPERLIPIDEMIA TYPE: ICD-10-CM

## 2018-01-25 DIAGNOSIS — I63.9 CEREBELLAR INFARCTION (HCC): Primary | ICD-10-CM

## 2018-01-25 DIAGNOSIS — I67.89 FLACCID HEMIPLEGIA OF LEFT DOMINANT SIDE DUE TO OTHER CEREBROVASCULAR DISEASE: ICD-10-CM

## 2018-01-25 LAB
25(OH)D3 SERPL-MCNC: 54 NG/ML (ref 30–100)
ALBUMIN SERPL BCP-MCNC: 3.6 G/DL (ref 3.5–5)
ALP SERPL-CCNC: 143 U/L (ref 46–116)
ALT SERPL W P-5'-P-CCNC: 62 U/L (ref 12–78)
ANION GAP SERPL CALCULATED.3IONS-SCNC: 4 MMOL/L (ref 4–13)
AST SERPL W P-5'-P-CCNC: 34 U/L (ref 5–45)
BASOPHILS # BLD AUTO: 0.02 THOUSANDS/ΜL (ref 0–0.1)
BASOPHILS NFR BLD AUTO: 0 % (ref 0–1)
BILIRUB SERPL-MCNC: 0.46 MG/DL (ref 0.2–1)
BUN SERPL-MCNC: 17 MG/DL (ref 5–25)
CALCIUM SERPL-MCNC: 9.2 MG/DL (ref 8.3–10.1)
CHLORIDE SERPL-SCNC: 105 MMOL/L (ref 100–108)
CHOLEST SERPL-MCNC: 113 MG/DL (ref 50–200)
CO2 SERPL-SCNC: 32 MMOL/L (ref 21–32)
CREAT SERPL-MCNC: 0.67 MG/DL (ref 0.6–1.3)
EOSINOPHIL # BLD AUTO: 0.07 THOUSAND/ΜL (ref 0–0.61)
EOSINOPHIL NFR BLD AUTO: 1 % (ref 0–6)
ERYTHROCYTE [DISTWIDTH] IN BLOOD BY AUTOMATED COUNT: 13.1 % (ref 11.6–15.1)
GFR SERPL CREATININE-BSD FRML MDRD: 95 ML/MIN/1.73SQ M
GLUCOSE P FAST SERPL-MCNC: 80 MG/DL (ref 65–99)
HCT VFR BLD AUTO: 44.3 % (ref 34.8–46.1)
HDLC SERPL-MCNC: 60 MG/DL (ref 40–60)
HGB BLD-MCNC: 15.1 G/DL (ref 11.5–15.4)
LDLC SERPL CALC-MCNC: 35 MG/DL (ref 0–100)
LYMPHOCYTES # BLD AUTO: 2.44 THOUSANDS/ΜL (ref 0.6–4.47)
LYMPHOCYTES NFR BLD AUTO: 44 % (ref 14–44)
MCH RBC QN AUTO: 31.9 PG (ref 26.8–34.3)
MCHC RBC AUTO-ENTMCNC: 34.1 G/DL (ref 31.4–37.4)
MCV RBC AUTO: 94 FL (ref 82–98)
MONOCYTES # BLD AUTO: 0.33 THOUSAND/ΜL (ref 0.17–1.22)
MONOCYTES NFR BLD AUTO: 6 % (ref 4–12)
NEUTROPHILS # BLD AUTO: 2.62 THOUSANDS/ΜL (ref 1.85–7.62)
NEUTS SEG NFR BLD AUTO: 49 % (ref 43–75)
NRBC BLD AUTO-RTO: 0 /100 WBCS
PLATELET # BLD AUTO: 257 THOUSANDS/UL (ref 149–390)
PMV BLD AUTO: 10.3 FL (ref 8.9–12.7)
POTASSIUM SERPL-SCNC: 4 MMOL/L (ref 3.5–5.3)
PROT SERPL-MCNC: 7.1 G/DL (ref 6.4–8.2)
RBC # BLD AUTO: 4.74 MILLION/UL (ref 3.81–5.12)
SODIUM SERPL-SCNC: 141 MMOL/L (ref 136–145)
TRIGL SERPL-MCNC: 88 MG/DL
TSH SERPL DL<=0.05 MIU/L-ACNC: 2.77 UIU/ML (ref 0.36–3.74)
WBC # BLD AUTO: 5.49 THOUSAND/UL (ref 4.31–10.16)

## 2018-01-25 PROCEDURE — 84443 ASSAY THYROID STIM HORMONE: CPT

## 2018-01-25 PROCEDURE — 80061 LIPID PANEL: CPT

## 2018-01-25 PROCEDURE — 97110 THERAPEUTIC EXERCISES: CPT | Performed by: PHYSICAL THERAPIST

## 2018-01-25 PROCEDURE — 85025 COMPLETE CBC W/AUTO DIFF WBC: CPT

## 2018-01-25 PROCEDURE — 36415 COLL VENOUS BLD VENIPUNCTURE: CPT

## 2018-01-25 PROCEDURE — 80053 COMPREHEN METABOLIC PANEL: CPT

## 2018-01-25 PROCEDURE — 82306 VITAMIN D 25 HYDROXY: CPT

## 2018-01-30 ENCOUNTER — OFFICE VISIT (OUTPATIENT)
Dept: SPEECH THERAPY | Facility: CLINIC | Age: 63
End: 2018-01-30
Payer: COMMERCIAL

## 2018-01-30 ENCOUNTER — OFFICE VISIT (OUTPATIENT)
Dept: PHYSICAL THERAPY | Facility: CLINIC | Age: 63
End: 2018-01-30
Payer: COMMERCIAL

## 2018-01-30 DIAGNOSIS — I67.89 FLACCID HEMIPLEGIA OF LEFT DOMINANT SIDE DUE TO OTHER CEREBROVASCULAR DISEASE: ICD-10-CM

## 2018-01-30 DIAGNOSIS — G81.02 FLACCID HEMIPLEGIA OF LEFT DOMINANT SIDE DUE TO OTHER CEREBROVASCULAR DISEASE: ICD-10-CM

## 2018-01-30 DIAGNOSIS — I69.062: Primary | ICD-10-CM

## 2018-01-30 DIAGNOSIS — I63.9 CEREBELLAR INFARCTION (HCC): Primary | ICD-10-CM

## 2018-01-30 DIAGNOSIS — G81.02: ICD-10-CM

## 2018-01-30 PROCEDURE — 92507 TX SP LANG VOICE COMM INDIV: CPT | Performed by: NURSE PRACTITIONER

## 2018-01-30 PROCEDURE — 97112 NEUROMUSCULAR REEDUCATION: CPT | Performed by: PHYSICAL THERAPIST

## 2018-01-30 PROCEDURE — 97110 THERAPEUTIC EXERCISES: CPT | Performed by: PHYSICAL THERAPIST
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
## 2018-01-30 NOTE — PROGRESS NOTES
Daily Note     Today's date: 2018  Patient name: Hay Meyers  : 1955  MRN: [id number]  Referring provider: Mayda Davis DO  Dx: LEVAR      Speciality Daily Treatment Diary  Re-eval Date:18    Date 18      Visit Count 4/8 5/8      Pain In 1/10 0/10      Pain Out 1/10 0/10      Time In/Out 11-12:00 11-12:00                Subjective: Patient is non-verbal   Notes feeling better today  Son notes he ordered a HW for home use  Objective: See treatment diary below  Precautions   MAFO right LE  Falls    Specialty Daily Treatment Diary     Manual  18      Hamstring Stretch prn       Quad stretch prn       Iliacus Stretch prn                           Exercise Diary         Seated LE TE 20 mins       Hip ABduction 2x10 Red TB 2 x 10 Green      ADductions  2x10 Red TB 2 x 10 Green      hamstring curl 2x10 Red TB       DF/PF  2x10 Red TB 2 x 10 Green      Hip flexion 2 x 10 2#       LAQ 2 x 10 2#                        Left UE TE 10 mins 10 mins (stand)      Biceps 2# 2 x 10 ea 2 x 10 Green      Triceps 2 x 10 Red 2 x 10 Green      IR 2 x 10 Red 2 x 10 Green      ER 2 x 10 Red 2 x 10 Green      MTP/LTP 2 x 10 Red 2 x 10 Green      ABduction  2 x 10 Green              Transfer Training  10 mins              Ambulation 15 mins 15 mins        Ambulation with HW - min assist to move from sit to stand from w/c; close supervision from [facility]  Ambulates 60 feet with supervision, able to clear foot, requires cues for technique for turning to sit  Transfers with  - close supervision with mod verbal cues for technique to include hand placement      Modalities         prn                             Neuro Exam :     Cognition   Following Directions: follows one step commands    Problem Solving: moderately impaired    Judgement: intact      Functional Outcome Measures   Single leg stance left eyes open:  Unable  Single leg stance left eyes closed:  Unable  Single leg stance right eyes open:  Unable  Single leg stance right eyes closed:  Unable    Assessment: Patient with improved tolerance tho session this date compared to last session  Patient remains uneasy about attempting to ambulate with son, but encouraged  Patient noted to be at supervision level  She is demonstrating improved mobility overall and will benefit from ongoing PT  Plan: Progress treatment as tolerated  Family training in upcoming sessions

## 2018-02-01 ENCOUNTER — OFFICE VISIT (OUTPATIENT)
Dept: PHYSICAL THERAPY | Facility: CLINIC | Age: 63
End: 2018-02-01
Payer: COMMERCIAL

## 2018-02-01 ENCOUNTER — OFFICE VISIT (OUTPATIENT)
Dept: SPEECH THERAPY | Facility: CLINIC | Age: 63
End: 2018-02-01
Payer: COMMERCIAL

## 2018-02-01 DIAGNOSIS — R47.01 EXPRESSIVE APHASIA: ICD-10-CM

## 2018-02-01 DIAGNOSIS — I63.9 CEREBELLAR INFARCTION (HCC): Primary | ICD-10-CM

## 2018-02-01 DIAGNOSIS — Z86.73 H/O ISCHEMIC LEFT MCA STROKE: ICD-10-CM

## 2018-02-01 DIAGNOSIS — I69.062: Primary | ICD-10-CM

## 2018-02-01 PROCEDURE — 97164 PT RE-EVAL EST PLAN CARE: CPT | Performed by: PHYSICAL THERAPIST

## 2018-02-01 PROCEDURE — G8990 OTHER PT/OT CURRENT STATUS: HCPCS | Performed by: PHYSICAL THERAPIST

## 2018-02-01 PROCEDURE — 92507 TX SP LANG VOICE COMM INDIV: CPT | Performed by: NURSE PRACTITIONER

## 2018-02-01 PROCEDURE — G8991 OTHER PT/OT GOAL STATUS: HCPCS | Performed by: PHYSICAL THERAPIST

## 2018-02-01 PROCEDURE — 97112 NEUROMUSCULAR REEDUCATION: CPT | Performed by: PHYSICAL THERAPIST

## 2018-02-01 PROCEDURE — 97110 THERAPEUTIC EXERCISES: CPT | Performed by: PHYSICAL THERAPIST
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
## 2018-02-01 NOTE — LETTER
2018    Magda Cotto DO  [address] [address]    Patient: Shayla Bryan   YOB: 1955   Date of Visit: 2018       Dear Dr Esther Hawkins:    Please review the attached summary of Mariaelena Duarte's progress and our plan for continued therapy, and verify that you agree therapy should continue by signing the attached document and sending it back to our office  If you have any questions or concerns, please don't hesitate to call  Sincerely,      Kathleene Khan Walden Kanner, PT, MPT, CLT        Referring Provider:      Based upon review of the patient's progress and continued therapy plan, it is my medical opinion that Rubio Mcnulty should continue physical therapy treatment at the Physical Therapy at [address] [place] [address] [address]:                    Magda Cotto DO  [address] [address]: [phone]          PT Re-Evaluation     Today's date: 2018  Patient name: Shayla Bryan  : 1955  MRN: [id number]  Referring provider: Dario Snowden DO  Dx: CVA    Assessment  Impairments: abnormal coordination, abnormal gait, abnormal muscle tone, abnormal or restricted ROM, activity intolerance, difficulty understanding, impaired balance, impaired physical strength and safety issue    Assessment details: Shayla Bryan is a 58 y o  female presenting to outpatient physical therapy with diagnosis of Cerebellar infarction ([facility] [address] )  Patient presents with weakness,  reduced balance, reduced endurance, reduced postural awareness, and reduced functional activity tolerance  Patient to benefit from skilled outpatient physical therapy to address deficits as noted, improve stability, and improve functional mobility/functional activity in order to maximize return to prior level of function with reduced limitations   Thank you for your referral     Understanding of Dx/Px/POC: good   Prognosis: good  Prognosis details: Son supportive    Goals  Management of steps with no more than min A for 12 steps - partially  met  Demonstrate ability to ambulate at least 75 feet with supervision, right UE supported and MAFO in place - partially met  Demonstrate ability to perform w/c to car transfer with no more than min A - met  Demonstrate ability to negotiate with w/c on ramp/level surface with supervision using left UE/LE - met  Demonstrate carryover for ambulation at [facility] with family using HW for household distances - partially met  Demonstrate consistent participation in meal prep - partially met    Plan  Patient would benefit from: skilled PTPlanned therapy interventions: manual therapy, neuromuscular re-education, self care, therapeutic exercise and home exercise program  Frequency: 1x month  Treatment plan discussed with: patient and family        Subjective Evaluation    History of Present Illness  Mechanism of injury: See Red[facility] for initial evaluation and last re-eval        Objective     Static Posture     Comments  Right UE flaccid since CVA    Strength/Myotome Testing     Left Hip   Planes of Motion   Flexion: 4-  Extension: 4-  Abduction: 4-  Adduction: 4-    Right Hip   Planes of Motion   Flexion: 3-  Extension: 3-  Abduction: 2  Adduction: 2    Left Knee   Flexion: 4  Extension: 4    Right Knee   Flexion: 3  Extension: 3    Left Ankle/Foot   Dorsiflexion: WFL  Plantar flexion: WFL  Additional Strength Details  ROM grossly WFL bilaterally tested in supine    Ambulation     Comments   Ambulates on level surfaces with left HW, right MAFO in place  Demonstrates ability to walk 40-60 feet with HW and supervision at this point  Able to clear 1" obstacles  Difficulty on compliant surfaces and difficulty with elevations if lip on step is present  Functional Assessment     Comments  Tinetti Balance and Gait Assessment  Balance Assessment:  Begin in a hard, armless chair    Sitting Balance   1= Steady, safe    Rises From Chair  1= Able to Rise, requires use of arms to help    Attempts to Rise  1= Able to Rise, requires more than one attempt    Standing Balance (First 5 seconds)  1= Steady, but uses walker or other support    Nudged  0= Begins to fall    Eyes Closed  1= Steady    Turning 360 degrees  0= Discontinuous steps    1= Steady    Sitting Down (Getting Seated)  1= Uses arms or not a smooth transition    Sub Score: 7/16    Gait Assessment:  Stand with the patient  Walk across the room at a usual pace, then rapidly    Initiation of Gait  1= No hesitancy    Step Length and Height  1= Step through left    Foot Clearance  1= Left foot clears the floor  1= Right foot clears the floor    Step Symmetry  0= Right and left step length are not equal    Step Continuity  1= Steps appear continuous    Path  1= Mild/moderate deviation OR uses a walking aid    Trunk  0= Marked sway or uses a walking aid    Walking Time  1= Heels almost touching while walking    Sub Score:  7/12  Total Score: 14/28        Flowsheet Rows    Flowsheet Row Most Recent Value   PT/OT G-Codes   FOTO information reviewed  N/A   Assessment Type  Re-evaluation   G code set  Other PT/OT Primary   Other PT Primary Current Status ()  CJ   Other PT Primary Goal Status ()  CI        Speciality Daily Treatment Diary  Re-eval Date:3/1/18    Date 1/25/18 1/30/18 2/1/18     Visit Count 4/8 5/8      Pain In 1/10 0/10      Pain Out 1/10 0/10      Time In/Out 11-12:00 11-12:00                Objective: See treatment diary below  Precautions   MAFO right LE  Falls    Specialty Daily Treatment Diary     Manual  1/25/18 1/30/18      Hamstring Stretch prn       Quad stretch prn       Iliacus Stretch prn                           Exercise Diary         Seated LE TE 20 mins       Hip ABduction 2x10 Red TB 2 x 10 Green      ADductions  2x10 Red TB 2 x 10 Green      hamstring curl 2x10 Red TB       DF/PF  2x10 Red TB 2 x 10 Green      Hip flexion 2 x 10 2#       LAQ 2 x 10 2#                        Left UE TE 10 mins 10 mins (stand)      Biceps 2# 2 x 10 ea 2 x 10 Green      Triceps 2 x 10 Red 2 x 10 Green      IR 2 x 10 Red 2 x 10 Green      ER 2 x 10 Red 2 x 10 Green      MTP/LTP 2 x 10 Red 2 x 10 Green      ABduction  2 x 10 Green              Transfer Training  10 mins 5 mins             Dynamic Balance   15 mins             Ambulation 15 mins 15 mins 15 mins       Ambulation with HW - min assist to move from sit to stand from w/c; close supervision from [facility]  Ambulates 60 feet with supervision, able to clear foot, requires cues for technique for turning to sit  Transfers with HW - close supervision with mod verbal cues for technique to include hand placement  Cues for surfaces without arm rests  Toilet transfer with grab bars and sylvester-walker  Min A for clothing management  Dynamic Balance:  ADL kitchen activity, reaching with 1-5# pots/bowls  Struggles with non-handled items at this time  Encouraged forward weight shift to support pelvis/hip on counter while performing UE tasks  Struggles      Modalities         prn

## 2018-02-01 NOTE — PROGRESS NOTES
PT Re-Evaluation     Today's date: 2018  Patient name: Dean Pearson  : 1955  MRN: [id number]  Referring provider: Estephania Flores DO  Dx: CVA    Assessment  Impairments: abnormal coordination, abnormal gait, abnormal muscle tone, abnormal or restricted ROM, activity intolerance, difficulty understanding, impaired balance, impaired physical strength and safety issue    Assessment details: Dean Pearson is a 58 y o  female presenting to outpatient physical therapy with diagnosis of Cerebellar infarction ([facility] [address] )  Patient presents with weakness,  reduced balance, reduced endurance, reduced postural awareness, and reduced functional activity tolerance  Patient to benefit from skilled outpatient physical therapy to address deficits as noted, improve stability, and improve functional mobility/functional activity in order to maximize return to prior level of function with reduced limitations   Thank you for your referral     Understanding of Dx/Px/POC: good   Prognosis: good  Prognosis details: Son supportive    Goals  Management of steps with no more than min A for 12 steps - partially  met  Demonstrate ability to ambulate at least 75 feet with supervision, right UE supported and MAFO in place - partially met  Demonstrate ability to perform w/c to car transfer with no more than min A - met  Demonstrate ability to negotiate with w/c on ramp/level surface with supervision using left UE/LE - met  Demonstrate carryover for ambulation at [facility] with family using HW for household distances - partially met  Demonstrate consistent participation in meal prep - partially met    Plan  Patient would benefit from: skilled PTPlanned therapy interventions: manual therapy, neuromuscular re-education, self care, therapeutic exercise and home exercise program  Frequency: 1x month  Treatment plan discussed with: patient and family        Subjective Evaluation    History of Present Illness  Mechanism of injury: See Red[facility] for initial evaluation and last re-eval        Objective     Static Posture     Comments  Right UE flaccid since CVA    Strength/Myotome Testing     Left Hip   Planes of Motion   Flexion: 4-  Extension: 4-  Abduction: 4-  Adduction: 4-    Right Hip   Planes of Motion   Flexion: 3-  Extension: 3-  Abduction: 2  Adduction: 2    Left Knee   Flexion: 4  Extension: 4    Right Knee   Flexion: 3  Extension: 3    Left Ankle/Foot   Dorsiflexion: WFL  Plantar flexion: WFL  Additional Strength Details  ROM grossly WFL bilaterally tested in supine    Ambulation     Comments   Ambulates on level surfaces with left HW, right MAFO in place  Demonstrates ability to walk 40-60 feet with HW and supervision at this point  Able to clear 1" obstacles  Difficulty on compliant surfaces and difficulty with elevations if lip on step is present  Functional Assessment     Comments  Tinetti Balance and Gait Assessment  Balance Assessment:  Begin in a hard, armless chair  Sitting Balance   1= Steady, safe    Rises From Chair  1= Able to Rise, requires use of arms to help    Attempts to Rise  1= Able to Rise, requires more than one attempt    Standing Balance (First 5 seconds)  1= Steady, but uses walker or other support    Nudged  0= Begins to fall    Eyes Closed  1= Steady    Turning 360 degrees  0= Discontinuous steps    1= Steady    Sitting Down (Getting Seated)  1= Uses arms or not a smooth transition    Sub Score: 7/16    Gait Assessment:  Stand with the patient  Walk across the room at a usual pace, then rapidly    Initiation of Gait  1= No hesitancy    Step Length and Height  1= Step through left    Foot Clearance  1= Left foot clears the floor  1= Right foot clears the floor    Step Symmetry  0= Right and left step length are not equal    Step Continuity  1= Steps appear continuous    Path  1= Mild/moderate deviation OR uses a walking aid    Trunk  0= Marked sway or uses a walking aid    Walking Time  1= Heels almost touching while walking    Sub Score:  7/12  Total Score: 14/28        Flowsheet Rows    Flowsheet Row Most Recent Value   PT/OT G-Codes   FOTO information reviewed  N/A   Assessment Type  Re-evaluation   G code set  Other PT/OT Primary   Other PT Primary Current Status ()  CJ   Other PT Primary Goal Status ()  CI        Speciality Daily Treatment Diary  Re-eval Date:3/1/18    Date 1/25/18 1/30/18 2/1/18     Visit Count 4/8 5/8      Pain In 1/10 0/10      Pain Out 1/10 0/10      Time In/Out 11-12:00 11-12:00                Objective: See treatment diary below  Precautions   MAFO right LE  Falls    Specialty Daily Treatment Diary     Manual  1/25/18 1/30/18      Hamstring Stretch prn       Quad stretch prn       Iliacus Stretch prn                           Exercise Diary         Seated LE TE 20 mins       Hip ABduction 2x10 Red TB 2 x 10 Green      ADductions  2x10 Red TB 2 x 10 Green      hamstring curl 2x10 Red TB       DF/PF  2x10 Red TB 2 x 10 Green      Hip flexion 2 x 10 2#       LAQ 2 x 10 2#                        Left UE TE 10 mins 10 mins (stand)      Biceps 2# 2 x 10 ea 2 x 10 Green      Triceps 2 x 10 Red 2 x 10 Green      IR 2 x 10 Red 2 x 10 Green      ER 2 x 10 Red 2 x 10 Green      MTP/LTP 2 x 10 Red 2 x 10 Green      ABduction  2 x 10 Green              Transfer Training  10 mins 5 mins             Dynamic Balance   15 mins             Ambulation 15 mins 15 mins 15 mins       Ambulation with HW - min assist to move from sit to stand from w/c; close supervision from [facility]  Ambulates 60 feet with supervision, able to clear foot, requires cues for technique for turning to sit  Transfers with HW - close supervision with mod verbal cues for technique to include hand placement  Cues for surfaces without arm rests  Toilet transfer with grab bars and sylvester-walker  Min A for clothing management  Dynamic Balance:  ADL kitchen activity, reaching with 1-5# pots/bowls    Struggles with non-handled items at this time  Encouraged forward weight shift to support pelvis/hip on counter while performing UE tasks  Struggles      Modalities        MH prn

## 2018-02-06 ENCOUNTER — OFFICE VISIT (OUTPATIENT)
Dept: PHYSICAL THERAPY | Facility: CLINIC | Age: 63
End: 2018-02-06
Payer: COMMERCIAL

## 2018-02-06 ENCOUNTER — OFFICE VISIT (OUTPATIENT)
Dept: SPEECH THERAPY | Facility: CLINIC | Age: 63
End: 2018-02-06
Payer: COMMERCIAL

## 2018-02-06 DIAGNOSIS — I63.9 CEREBELLAR INFARCTION (HCC): Primary | ICD-10-CM

## 2018-02-06 DIAGNOSIS — G81.02 FLACCID HEMIPLEGIA OF LEFT DOMINANT SIDE DUE TO OTHER CEREBROVASCULAR DISEASE: ICD-10-CM

## 2018-02-06 DIAGNOSIS — R47.01 EXPRESSIVE APHASIA: Primary | ICD-10-CM

## 2018-02-06 DIAGNOSIS — I67.89 FLACCID HEMIPLEGIA OF LEFT DOMINANT SIDE DUE TO OTHER CEREBROVASCULAR DISEASE: ICD-10-CM

## 2018-02-06 PROCEDURE — 97110 THERAPEUTIC EXERCISES: CPT | Performed by: PHYSICAL THERAPIST

## 2018-02-06 PROCEDURE — 92507 TX SP LANG VOICE COMM INDIV: CPT
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
## 2018-02-07 NOTE — PROGRESS NOTES
Daily Note     Today's date: 2018  Patient name: Dean Pearson  : 1955  MRN: [id number]  Referring provider: Estephania Flores DO  Dx:   Encounter Diagnoses   Name Primary?  Cerebellar infarction ([facility] [address] ) Yes    Flaccid hemiplegia of left dominant side due to other cerebrovascular disease [facility])        Speciality Daily Treatment Diary  Re-eval Date:3/1/18    Date 18    Visit Count  5    Pain In 1/10 0/10  0/10    Pain Out 1/10 0/10  0/10    Time In/Out 11-12:00 11-12:00  11-11:15              Objective: See treatment diary below  Precautions   MAFO right LE  Falls    Specialty Daily Treatment Diary     Manual  18      Hamstring Stretch prn   NP    Quad stretch prn   NP    Iliacus Stretch prn   NP                        Exercise Diary         Seated LE TE 20 mins   NP    Hip ABduction 2x10 Red TB 2 x 10 Green      ADductions  2x10 Red TB 2 x 10 Green      hamstring curl 2x10 Red TB       DF/PF  2x10 Red TB 2 x 10 Green      Hip flexion 2 x 10 2#       LAQ 2 x 10 2#       Nu-Step    10 mins L1             Left UE TE 10 mins 10 mins (stand)      Biceps 2# 2 x 10 ea 2 x 10 Green      Triceps 2 x 10 Red 2 x 10 Green      IR 2 x 10 Red 2 x 10 Green      ER 2 x 10 Red 2 x 10 Green      MTP/LTP 2 x 10 Red 2 x 10 Green      ABduction  2 x 10 Green              Transfer Training  10 mins 5 mins             Dynamic Balance   15 mins             Ambulation 15 mins 15 mins 15 mins 5 mins      Ambulation with HW - Ambulates 40 feet with HW supervision, able to clear foot, requires cues for technique for turning to sit  Modalities        MH prn                         Assessment:  Clinic without heat this date  Patient declines further treatment  HW presented from home, adjusted for patient to use at home  Encouraged use  Plan:  Cont per POC

## 2018-02-08 ENCOUNTER — APPOINTMENT (OUTPATIENT)
Dept: SPEECH THERAPY | Facility: CLINIC | Age: 63
End: 2018-02-08
Payer: COMMERCIAL

## 2018-02-08 ENCOUNTER — APPOINTMENT (OUTPATIENT)
Dept: PHYSICAL THERAPY | Facility: CLINIC | Age: 63
End: 2018-02-08
Payer: COMMERCIAL

## 2018-02-13 ENCOUNTER — OFFICE VISIT (OUTPATIENT)
Dept: SPEECH THERAPY | Facility: CLINIC | Age: 63
End: 2018-02-13
Payer: COMMERCIAL

## 2018-02-13 DIAGNOSIS — R47.01 EXPRESSIVE APHASIA: Primary | ICD-10-CM

## 2018-02-13 DIAGNOSIS — I69.062: ICD-10-CM

## 2018-02-13 PROCEDURE — 92507 TX SP LANG VOICE COMM INDIV: CPT | Performed by: NURSE PRACTITIONER
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
## 2018-02-15 ENCOUNTER — OFFICE VISIT (OUTPATIENT)
Dept: SPEECH THERAPY | Facility: CLINIC | Age: 63
End: 2018-02-15
Payer: COMMERCIAL

## 2018-02-15 ENCOUNTER — OFFICE VISIT (OUTPATIENT)
Dept: PHYSICAL THERAPY | Facility: CLINIC | Age: 63
End: 2018-02-15
Payer: COMMERCIAL

## 2018-02-15 DIAGNOSIS — I63.9 CEREBELLAR INFARCTION (HCC): Primary | ICD-10-CM

## 2018-02-15 DIAGNOSIS — I67.89 FLACCID HEMIPLEGIA OF LEFT DOMINANT SIDE DUE TO OTHER CEREBROVASCULAR DISEASE: ICD-10-CM

## 2018-02-15 DIAGNOSIS — R47.01 EXPRESSIVE APHASIA: Primary | ICD-10-CM

## 2018-02-15 DIAGNOSIS — G81.02 FLACCID HEMIPLEGIA OF LEFT DOMINANT SIDE DUE TO OTHER CEREBROVASCULAR DISEASE: ICD-10-CM

## 2018-02-15 PROCEDURE — 97112 NEUROMUSCULAR REEDUCATION: CPT | Performed by: PHYSICAL THERAPIST

## 2018-02-15 PROCEDURE — 92507 TX SP LANG VOICE COMM INDIV: CPT | Performed by: NURSE PRACTITIONER

## 2018-02-15 PROCEDURE — 97110 THERAPEUTIC EXERCISES: CPT | Performed by: PHYSICAL THERAPIST

## 2018-02-15 NOTE — PROGRESS NOTES
Daily Note     Today's date: 2/15/2018  Patient name: Farhana Mello  : 1955  MRN: [id number]  Referring provider: Armando Lord DO  Dx:   Encounter Diagnoses   Name Primary?  Cerebellar infarction ([facility] [address] ) Yes    Flaccid hemiplegia of left dominant side due to other cerebrovascular disease [facility])      Speciality Daily Treatment Diary  Re-eval Date:3/1/18    Date 1/25/18 1/30/18 2/1/18 2/6/18 2/15/18   Visit Count 4/8 5/8 6/8 7/8 8/8   Pain In 1/10 0/10  0/10 0/10   Pain Out 1/10 0/10  0/10 0/10   Time In/Out 11-:00 11-12:00  11-:15 10-11:00             Objective: See treatment diary below  Precautions   MAFO right LE  Falls    Manual  18      Hamstring Stretch prn   NP NP   Quad stretch prn   NP    Iliacus Stretch prn   NP                        Exercise Diary         Seated LE TE 20 mins   NP NP   Hip ABduction 2x10 Red TB 2 x 10 Green      ADductions  2x10 Red TB 2 x 10 Green      hamstring curl 2x10 Red TB       DF/PF  2x10 Red TB 2 x 10 Green      Hip flexion 2 x 10 2#       LAQ 2 x 10 2#       Nu-Step    10 mins L1             Left UE TE 10 mins 10 mins (stand)   NP   Biceps 2# 2 x 10 ea 2 x 10 Green      Triceps 2 x 10 Red 2 x 10 Green      IR 2 x 10 Red 2 x 10 Green      ER 2 x 10 Red 2 x 10 Green      MTP/LTP 2 x 10 Red 2 x 10 Green      ABduction  2 x 10 Green              Transfer Training  10 mins 5 mins  10 mins           Dynamic Balance   15 mins  25 mins           Ambulation 15 mins 15 mins 15 mins 5 mins 20 mins     Ambulation with HW - Ambulates 40 feet with HW supervision, able to clear foot, requires cues for technique for turning to sit  Completed 2 trials  Ambulates 25 feet x 2 with HW and supervision x 1 trial, 10 feet x 1 trial with same  Transfer training: sit to/from stand, toilet transfer, w/c to/from plinth with verbal cues and intermittent manual cues for hand placement  Dynamic Balance:  Standing reaching with single UE support intermittent    Cues for stabilizing while leaning forward onto counter  Reaching overhead, shoulder height, to knee height  Incorporated rotation  Struggled significantly with item identification and divided attention with 2 step tasks  Modalities         prn                               Assessment: Tolerated treatment well  Patient demonstrated fatigue post treatment  Reports she did not attempt ambulation at home with [facility] yet, still fearful  PT will reinforce with son  Patient overall making steady progress  Cont per POC  Plan: Continue per plan of care
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
## 2018-02-20 ENCOUNTER — OFFICE VISIT (OUTPATIENT)
Dept: SPEECH THERAPY | Facility: CLINIC | Age: 63
End: 2018-02-20
Payer: COMMERCIAL

## 2018-02-20 ENCOUNTER — OFFICE VISIT (OUTPATIENT)
Dept: PHYSICAL THERAPY | Facility: CLINIC | Age: 63
End: 2018-02-20
Payer: COMMERCIAL

## 2018-02-20 DIAGNOSIS — R47.01 EXPRESSIVE APHASIA: Primary | ICD-10-CM

## 2018-02-20 DIAGNOSIS — G81.02 FLACCID HEMIPLEGIA OF LEFT DOMINANT SIDE DUE TO OTHER CEREBROVASCULAR DISEASE: ICD-10-CM

## 2018-02-20 DIAGNOSIS — I63.9 CEREBELLAR INFARCTION (HCC): Primary | ICD-10-CM

## 2018-02-20 DIAGNOSIS — I67.89 FLACCID HEMIPLEGIA OF LEFT DOMINANT SIDE DUE TO OTHER CEREBROVASCULAR DISEASE: ICD-10-CM

## 2018-02-20 PROCEDURE — 97150 GROUP THERAPEUTIC PROCEDURES: CPT | Performed by: PHYSICAL THERAPIST

## 2018-02-20 PROCEDURE — 97112 NEUROMUSCULAR REEDUCATION: CPT | Performed by: PHYSICAL THERAPIST

## 2018-02-20 PROCEDURE — 92507 TX SP LANG VOICE COMM INDIV: CPT

## 2018-02-20 NOTE — PROGRESS NOTES
Daily Note     Today's date: 2018  Patient name: Shelley Cornell  : 1955  MRN: [id number]  Referring provider: Maximiliano Everett DO  Dx:   Encounter Diagnosis     ICD-10-CM    1  Cerebellar infarction ([facility] [address] ) I63 9    2  Flaccid hemiplegia of left dominant side due to other cerebrovascular disease [facility] ) G81 02     I67 89         Speciality Daily Treatment Diary  Re-eval Date:3/1/18    Date 18       Visit Count 1/8       Pain In 0/10       Pain Out 0/10       Time In/Out 10-11:00                 Objective: See treatment diary below  Precautions   MAFO right LE  Falls    Manual         Hamstring Stretch prn       Quad stretch prn       Iliacus Stretch prn                           Exercise Diary         Seated LE TE        Hip ABduction 2x10 Red TB       ADductions  2x10 Red TB       hamstring curl 2x10 Red TB       DF/PF  2x10 Red TB   Left only       Hip flexion 2 x 10 2 5#       LAQ 2 x 10 2 5#       Nu-Step L2 10 mins                Left UE TE        Biceps 5# 2 x 10 ea       Triceps 2 x 10 Red       IR 2 x 10 Red       ER 2 x 10 Red       MTP/LTP 2 x 10 Red       ABduction Elbow flex'd  5# 2 x 10               Transfer Training                Dynamic Balance                Ambulation 15 mins         Ambulation with HW - Ambulates 45 feet with HW supervision, able to clear foot, requires cues for technique for turning to sit  Dynamic Balance:  Standing reaching with single UE support intermittent  Cues for stabilizing while leaning forward onto counter  Reaching overhead, shoulder height  Reaching for glass wear, placed in microwave in ADL kitchen, small plate settings  Patient demonstrates ability to use empty items, unable to manage with liquids at this time  Modalities         prn                           Assessment: Tolerated treatment well  Patient demonstrated fatigue post treatment   She is able to count to 10 with reps this date    Demonstrates decreased ability to continue with reps after initial  Instruction  Able to complete, at most, 3-4 reps before re-instruction  Plan: Continue per plan of care
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
## 2018-02-22 ENCOUNTER — APPOINTMENT (OUTPATIENT)
Dept: SPEECH THERAPY | Facility: CLINIC | Age: 63
End: 2018-02-22
Payer: COMMERCIAL

## 2018-02-22 ENCOUNTER — APPOINTMENT (OUTPATIENT)
Dept: PHYSICAL THERAPY | Facility: CLINIC | Age: 63
End: 2018-02-22
Payer: COMMERCIAL

## 2018-02-27 ENCOUNTER — APPOINTMENT (OUTPATIENT)
Dept: PHYSICAL THERAPY | Facility: CLINIC | Age: 63
End: 2018-02-27
Payer: COMMERCIAL

## 2018-02-27 ENCOUNTER — APPOINTMENT (OUTPATIENT)
Dept: SPEECH THERAPY | Facility: CLINIC | Age: 63
End: 2018-02-27
Payer: COMMERCIAL

## 2018-03-06 ENCOUNTER — OFFICE VISIT (OUTPATIENT)
Dept: SPEECH THERAPY | Facility: CLINIC | Age: 63
End: 2018-03-06
Payer: COMMERCIAL

## 2018-03-06 ENCOUNTER — OFFICE VISIT (OUTPATIENT)
Dept: PHYSICAL THERAPY | Facility: CLINIC | Age: 63
End: 2018-03-06
Payer: COMMERCIAL

## 2018-03-06 DIAGNOSIS — I67.89 FLACCID HEMIPLEGIA OF LEFT DOMINANT SIDE DUE TO OTHER CEREBROVASCULAR DISEASE: ICD-10-CM

## 2018-03-06 DIAGNOSIS — G81.02 FLACCID HEMIPLEGIA OF LEFT DOMINANT SIDE DUE TO OTHER CEREBROVASCULAR DISEASE: ICD-10-CM

## 2018-03-06 DIAGNOSIS — I63.9 CEREBELLAR INFARCTION (HCC): Primary | ICD-10-CM

## 2018-03-06 DIAGNOSIS — R47.01 EXPRESSIVE APHASIA: Primary | ICD-10-CM

## 2018-03-06 PROCEDURE — 92507 TX SP LANG VOICE COMM INDIV: CPT | Performed by: NURSE PRACTITIONER

## 2018-03-06 PROCEDURE — G8982 BODY POS GOAL STATUS: HCPCS | Performed by: PHYSICAL THERAPIST

## 2018-03-06 PROCEDURE — G8981 BODY POS CURRENT STATUS: HCPCS | Performed by: PHYSICAL THERAPIST

## 2018-03-06 PROCEDURE — 97164 PT RE-EVAL EST PLAN CARE: CPT | Performed by: PHYSICAL THERAPIST

## 2018-03-06 PROCEDURE — 97110 THERAPEUTIC EXERCISES: CPT | Performed by: PHYSICAL THERAPIST

## 2018-03-06 PROCEDURE — 97112 NEUROMUSCULAR REEDUCATION: CPT | Performed by: PHYSICAL THERAPIST

## 2018-03-06 NOTE — PROGRESS NOTES
Speech Treatment Note    Today's date: 3/6/2018  Patient name: Gilford Argue  : 1955  MRN: [id number]  Referring provider: Tahmina Sampson DO  Dx: No diagnosis found  Subjective:  Pt arrived on time and attended session il'y  Patient had physical therapy immediately prior to today's session  Pt rolling her eye and sighing when asked "how did PT go today?"  Objective: 1  Pt will ID F2 and name objects, pictures,letters, numbers and body parts @ 100% il'y  -   Picture ID F2  100% il'y  100% il'y   100% i'ly increased to F3 95% i'ly  Picture Use/Category ID  @ 100% acc i'ly  Picture namimg  100% min-mod cue,  20% il'y increased to 100% min-mod cue  ( phonetic cue, sentence completion cue or object function gesture)   Fill in the blank @ 100% acc use of phonetic cue in 70% of trials  Letter ID F2  @ 100% il'y,   @ 90% il'y  2/15: 80% il'y   Letter naming  @ 100% after imitation  ( only completed with same 5 repetitive letters)  Body part ID  @ 100% mod cue,  @ 30% il'y increased to 100% mod cue   # ID F4  @ 100% il'y  # naming  # 1-4 max cue no carryover  2  Pt will answer yes/no questions @ 100% il'y  -  in context pertaining to familiar information @ 100% il'y   not pertaining to familiar information @ 40% il'y    Y/N @ 20% today, difficult time with this task  3  Pt will follow 2 step directions @ 80% min cue provided  n/a    4  Pt will repeat single syllable words @ 100% provided occasional min cue -  single syllable common words @ 100%  Imitated-max cue   66% il'y   :  Patient repeating single syllable words @ 10% acc today when given visual word  Patient able to repeat without visual cues @ 90% acc with model and cues! 5  Pt will improve automatic language with decreased prompt over time   -    ABC ( prompt to start and 7 errors throughout), 1-20 ( cue to start and 5 errors throughout), CARSON ( cue to start and 3 errors throughout)  2/6 ABC prompt to start, pt with 7-10 errors across all trials  1-10 @ 100%, 1-20 (pt with one error, repeated with increasing errors to 3-4)  CARSON (starter cue and 2 errors consistently)  Singing initiated today, Happy Birthday x2 with 1 error and Twinkle Twinkle x1 with two errors  2/13: ABC ( 11 letters cues  With visual cues and cue to start  Mostly carried over by 3rd trial)  Twinkle twinkle 6 errors and cues to start and happy birthday 4 errors and cues to start      2/15: nursery rhyme first lines @ 50% il'y, common sentence completion @ 42% il'y , SOW cye to start 1 error, 1-10 1 error, JERE cue to start 50% il'y     2/20:  Automatic speech of 1-10 with starter cue, phonemic cue x1  Counting 1-20 patient started i'ly, required phonemic cue x2-3 each trial   CARSON required starter cue, phonemic cue x1 and JERE starter cue, phonemic cue x7  Patient completed fill in the blank @ 60% i'ly, increased to 85% with phonemic cue and 95% with phonemic cue and gesture  Sentence completion 90% acc i'ly increased to 100% with phonemic cue     6  Pt will name and/or gesture object function @ 80% acc mod cue - 2/13: completed gesture or object function @ 15% il'y otherwise imitation required 100% acc  Sentence completion completed @ 10% il'y increased to 100% min-mod cue ( very little carryover)  2/15: imitated @ 80% il'y improved to 100% for improved accuracy  2/20:  Patient asked to provide gesture for a picture, multiple modalities/explanations used  Patient did not appear to understand task in full, completed @ 10% acc i'ly  3/6: named objects 1st trial 30% il'y increased to 100% min-max cue ( sentence completion, phonetic cue and/or unison speech) on 2nd trial with same task no carryover and actually decreased accuracy 10% il'y increased to 100% min-max cue ( sentence completion, phonetic cue and/or unison speech)   Gestured object function with objects 1st trial @ 60% il'y and 2nd trial same task decreased accuracy 10% il'y  Assessment:  Patient using phonetic cues, most helpful today  Patient cont to struggle with y/n questions and gesture  Pt with poor carryover today  Plan/Recommendations:  Recommend speech therapy 2-3x per week for 45-60 minute sessions for 12 weeks  Visit 8/30    Homework: 3/6 pt's son educated to name simple everyday objects and demonstrate function

## 2018-03-06 NOTE — PROGRESS NOTES
PT Re-Evaluation     Today's date: 3/6/2018  Patient name: Rafiq Rogers  : 1955  MRN: [id number]  Referring provider: Rickey Valentin DO  Dx: CVA    Assessment  Impairments: abnormal coordination, abnormal gait, abnormal muscle tone, abnormal or restricted ROM, activity intolerance, difficulty understanding, impaired balance, impaired physical strength and safety issue    Assessment details: Rafiq Rogers is a 58 y o  female presenting to outpatient physical therapy with diagnosis of Cerebellar infarction  Patient presents with chronic weakness,  reduced balance, reduced endurance, reduced postural awareness, and reduced functional activity tolerance  She is demonstrating improved carryover of ambulation at home, but intermittently  Son is supportive, patient remains fearful  Static standing balance has improved as well as endurance, however, patient self limits  Patient to benefit from skilled outpatient physical therapy to address deficits as noted, improve stability, and improve functional mobility/functional activity in order to maximize return to prior level of function with reduced limitations   Thank you for your referral     Understanding of Dx/Px/POC: good   Prognosis: good  Prognosis details: Son supportive    Goals  In 4 weeks, patient will:  Management of steps with no more than min A for 12 steps - partially  met  Demonstrate ability to ambulate at least 75 feet with supervision, right UE supported and MAFO in place - met  Demonstrate ability to perform w/c to car transfer with no more than min A - met  Demonstrate ability to negotiate with w/c on ramp/level surface with supervision using left UE/LE - met  Demonstrate carryover for ambulation at [facility] with family using [address] [place] [address] Dr Rodriguez for household distances - partially met  Demonstrate consistent participation in meal prep - partially met  Demonstrate ability to reach from waist to shoulder height with 5# to remove items from cabinet in kitchen - partially met (up to 3#)    Plan  Patient would benefit from: skilled PT  Planned therapy interventions: manual therapy, neuromuscular re-education, self care, therapeutic exercise and home exercise program  Frequency: 1x month  Treatment plan discussed with: patient and family        Subjective Evaluation    History of Present Illness  Mechanism of injury: See [facility] for initial evaluation and last re-eval    Update:  Patient's son reports she is walking at home with [facility] periodically  Patient is afraid of falling with puppy at home  Encouraged patient's son to have patient ambulate with HW for bathroom and crate puppy  Not a recurrent problem   Quality of life: good    Pain  No pain reported          Objective     Static Posture     Comments  Right UE flaccid since CVA    Strength/Myotome Testing     Left Hip   Planes of Motion   Flexion: 4-  Extension: 4-  Abduction: 4-  Adduction: 4-    Right Hip   Planes of Motion   Flexion: 3-  Extension: 3-  Abduction: 2  Adduction: 2    Left Knee   Flexion: 4  Extension: 4    Right Knee   Flexion: 3  Extension: 3    Left Ankle/Foot   Dorsiflexion: WFL  Plantar flexion: WFL  Additional Strength Details  ROM grossly WFL bilaterally tested in supine    Ambulation     Comments   Ambulates on level surfaces with left HW, right MAFO in place  Demonstrates ability to walk 40-80 feet with HW and supervision at this point  Able to clear 1" obstacles  Difficulty on compliant surfaces and difficulty with elevations if lip on step is present  Functional Assessment     Comments  Tinetti Balance and Gait Assessment  Balance Assessment:  Begin in a hard, armless chair    Sitting Balance   1= Steady, safe    Rises From Chair  1= Able to Rise, requires use of arms to help    Attempts to Rise  1= Able to Rise, requires more than one attempt    Standing Balance (First 5 seconds)  1= Steady, but uses walker or other support    Nudged  0= Begins to fall    Eyes Closed  1= Steady    Turning 360 degrees  0= Discontinuous steps    1= Steady    Sitting Down (Getting Seated)  1= Uses arms or not a smooth transition    Sub Score: 7/16    Gait Assessment:  Stand with the patient  Walk across the room at a usual pace, then rapidly    Initiation of Gait  1= No hesitancy    Step Length and Height  1= Step through left    Foot Clearance  1= Left foot clears the floor  1= Right foot clears the floor    Step Symmetry  0= Right and left step length are not equal    Step Continuity  1= Steps appear continuous    Path  1= Mild/moderate deviation OR uses a walking aid    Trunk  0= Marked sway or uses a walking aid    Walking Time  1= Heels almost touching while walking    Sub Score:  7/12  Total Score: 14/28        Flowsheet Rows    Flowsheet Row Most Recent Value   PT/OT G-Codes   Assessment Type  Re-evaluation   G code set  Changing & Maintaining Body Position   Changing and Maintaining Body Position Current Status ()  CL   Changing and Maintaining Body Position Goal Status ()          Speciality Daily Treatment Diary  Precautions:  MAFO right LE  Falls  Re-eval Date:4/5/18    Date 2/20/18 3/6/18      Visit Count 1/8 2/8      Pain In 0/10 See RE      Pain Out 0/10 See RE      Time In/Out 10-11:00         RE completed        Objective: See treatment diary below    Manual         Hamstring Stretch prn prn      Quad stretch prn prn      Iliacus Stretch prn prn                          Exercise Diary         Seated LE TE        Hip ABduction 2x10 Red TB 2x10 stand AROM      ADductions  2x10 Red TB 2x10 stand AROM      hamstring curl 2x10 Red TB 2x10 stand AROM      DF/PF  2x10 Red TB   Left only       Hip flexion 2 x 10 2 5# 2x10 stand AROM      LAQ 2 x 10 2 5#       Nu-Step L2 10 mins                Left UE TE        Biceps 5# 2 x 10 ea       Triceps 2 x 10 Red       IR 2 x 10 Red       ER 2 x 10 Red       MTP/LTP 2 x 10 Red       ABduction Elbow flex'd  5# 2 x 10               Transfer Training                Dynamic Balance  15 mins              Ambulation 15 mins 15 mins        Ambulation with HW - Ambulates 45 feet with HW supervision, able to clear foot, requires cues for technique for turning to sit  Step negotiation 4 x 3 with single HR and min A for 2 reps, mod A x 1 rep  Dynamic Balance:  Standing reaching waist to shoulder, waist to overhead  Lift 1#, 2#, 3#, 5# ea level  Static standing - max 5:51 with anterior pelvic support on counter    Modalities        MH prn prn

## 2018-03-06 NOTE — LETTER
2018    David Braxton DO  [address] [address]    Patient: Farhana Mello   YOB: 1955   Date of Visit: 3/6/2018     Encounter Diagnosis     ICD-10-CM    1  Cerebellar infarction ([facility] [address] ) I63 9    2  Flaccid hemiplegia of left dominant side due to other cerebrovascular disease [facility]) G81 02     I67 89        Dear Dr Warren Hopes:    Please review the attached Plan of Care from Trice Duarte's recent visit  Please verify that you agree therapy should continue by signing the attached document and sending it back to our office  If you have any questions or concerns, please don't hesitate to call  Sincerely,    Lisha Khoury      Referring Provider:      I certify that I have read the below Plan of Care and certify the need for these services furnished under this plan of treatment while under my care  David Braxton DO  [address]: [phone]          PT Re-Evaluation     Today's date: 3/6/2018  Patient name: Farhana Mello  : 1955  MRN: [id number]  Referring provider: Armando Lord DO  Dx: CVA    Assessment  Impairments: abnormal coordination, abnormal gait, abnormal muscle tone, abnormal or restricted ROM, activity intolerance, difficulty understanding, impaired balance, impaired physical strength and safety issue    Assessment details: Farhana Mello is a 58 y o  female presenting to outpatient physical therapy with diagnosis of Cerebellar infarction  Patient presents with chronic weakness,  reduced balance, reduced endurance, reduced postural awareness, and reduced functional activity tolerance  She is demonstrating improved carryover of ambulation at home, but intermittently  Son is supportive, patient remains fearful  Static standing balance has improved as well as endurance, however, patient self limits    Patient to benefit from skilled outpatient physical therapy to address deficits as noted, improve stability, and improve functional mobility/functional activity in order to maximize return to prior level of function with reduced limitations  Thank you for your referral     Understanding of Dx/Px/POC: good   Prognosis: good  Prognosis details: Son supportive    Goals  In 4 weeks, patient will:  Management of steps with no more than min A for 12 steps - partially  met  Demonstrate ability to ambulate at least 75 feet with supervision, right UE supported and MAFO in place - met  Demonstrate ability to perform w/c to car transfer with no more than min A - met  Demonstrate ability to negotiate with w/c on ramp/level surface with supervision using left UE/LE - met  Demonstrate carryover for ambulation at [facility] with family using HW for household distances - partially met  Demonstrate consistent participation in meal prep - partially met  Demonstrate ability to reach from waist to shoulder height with 5# to remove items from cabinet in kitchen - partially met (up to 3#)    Plan  Patient would benefit from: skilled PT  Planned therapy interventions: manual therapy, neuromuscular re-education, self care, therapeutic exercise and home exercise program  Frequency: 1x month  Treatment plan discussed with: patient and family        Subjective Evaluation    History of Present Illness  Mechanism of injury: See [facility] for initial evaluation and last re-eval    Update:  Patient's son reports she is walking at home with [facility] periodically  Patient is afraid of falling with puppy at home  Encouraged patient's son to have patient ambulate with HW for bathroom and crate puppy    Not a recurrent problem   Quality of life: good    Pain  No pain reported          Objective     Static Posture     Comments  Right UE flaccid since CVA    Strength/Myotome Testing     Left Hip   Planes of Motion   Flexion: 4-  Extension: 4-  Abduction: 4-  Adduction: 4-    Right Hip   Planes of Motion   Flexion: 3-  Extension: 3-  Abduction: 2  Adduction: 2    Left Knee   Flexion: 4  Extension: 4    Right Knee   Flexion: 3  Extension: 3    Left Ankle/Foot   Dorsiflexion: WFL  Plantar flexion: WFL  Additional Strength Details  ROM grossly WFL bilaterally tested in supine    Ambulation     Comments   Ambulates on level surfaces with left HW, right MAFO in place  Demonstrates ability to walk 40-80 feet with HW and supervision at this point  Able to clear 1" obstacles  Difficulty on compliant surfaces and difficulty with elevations if lip on step is present  Functional Assessment     Comments  Tinetti Balance and Gait Assessment  Balance Assessment:  Begin in a hard, armless chair  Sitting Balance   1= Steady, safe    Rises From Chair  1= Able to Rise, requires use of arms to help    Attempts to Rise  1= Able to Rise, requires more than one attempt    Standing Balance (First 5 seconds)  1= Steady, but uses walker or other support    Nudged  0= Begins to fall    Eyes Closed  1= Steady    Turning 360 degrees  0= Discontinuous steps    1= Steady    Sitting Down (Getting Seated)  1= Uses arms or not a smooth transition    Sub Score: 7/16    Gait Assessment:  Stand with the patient  Walk across the room at a usual pace, then rapidly    Initiation of Gait  1= No hesitancy    Step Length and Height  1= Step through left    Foot Clearance  1= Left foot clears the floor  1= Right foot clears the floor    Step Symmetry  0= Right and left step length are not equal    Step Continuity  1= Steps appear continuous    Path  1= Mild/moderate deviation OR uses a walking aid    Trunk  0= Marked sway or uses a walking aid    Walking Time  1= Heels almost touching while walking    Sub Score:  7/12  Total Score: 14/28        Flowsheet Rows    Flowsheet Row Most Recent Value   PT/OT G-Codes   Assessment Type  Re-evaluation   G code set  Changing & Maintaining Body Position   Changing and Maintaining Body Position Current Status ()  CL   Changing and Maintaining Body Position Goal Status ()          Speciality Daily Treatment Diary  Precautions:  MAFO right LE  Falls  Re-eval Date:4/5/18    Date 2/20/18 3/6/18      Visit Count 1/8 2/8      Pain In 0/10 See RE      Pain Out 0/10 See RE      Time In/Out 10-11:00         RE completed        Objective: See treatment diary below    Manual         Hamstring Stretch prn prn      Quad stretch prn prn      Iliacus Stretch prn prn                          Exercise Diary         Seated LE TE        Hip ABduction 2x10 Red TB 2x10 stand AROM      ADductions  2x10 Red TB 2x10 stand AROM      hamstring curl 2x10 Red TB 2x10 stand AROM      DF/PF  2x10 Red TB   Left only       Hip flexion 2 x 10 2 5# 2x10 stand AROM      LAQ 2 x 10 2 5#       Nu-Step L2 10 mins                Left UE TE        Biceps 5# 2 x 10 ea       Triceps 2 x 10 Red       IR 2 x 10 Red       ER 2 x 10 Red       MTP/LTP 2 x 10 Red       ABduction Elbow flex'd  5# 2 x 10               Transfer Training                Dynamic Balance  15 mins              Ambulation 15 mins 15 mins        Ambulation with HW - Ambulates 45 feet with HW supervision, able to clear foot, requires cues for technique for turning to sit  Step negotiation 4 x 3 with single HR and min A for 2 reps, mod A x 1 rep  Dynamic Balance:  Standing reaching waist to shoulder, waist to overhead  Lift 1#, 2#, 3#, 5# ea level  Static standing - max 5:51 with anterior pelvic support on counter    Modalities        MH prn prn

## 2018-03-08 ENCOUNTER — TRANSCRIBE ORDERS (OUTPATIENT)
Dept: PHYSICAL THERAPY | Facility: CLINIC | Age: 63
End: 2018-03-08

## 2018-03-08 ENCOUNTER — OFFICE VISIT (OUTPATIENT)
Dept: SPEECH THERAPY | Facility: CLINIC | Age: 63
End: 2018-03-08
Payer: COMMERCIAL

## 2018-03-08 ENCOUNTER — OFFICE VISIT (OUTPATIENT)
Dept: PHYSICAL THERAPY | Facility: CLINIC | Age: 63
End: 2018-03-08
Payer: COMMERCIAL

## 2018-03-08 DIAGNOSIS — I61.9: ICD-10-CM

## 2018-03-08 DIAGNOSIS — G81.02 FLACCID HEMIPLEGIA OF LEFT DOMINANT SIDE DUE TO OTHER CEREBROVASCULAR DISEASE: ICD-10-CM

## 2018-03-08 DIAGNOSIS — I63.9 IMPENDING CEREBROVASCULAR ACCIDENT (HCC): Primary | ICD-10-CM

## 2018-03-08 DIAGNOSIS — I63.9 CEREBELLAR INFARCTION (HCC): Primary | ICD-10-CM

## 2018-03-08 DIAGNOSIS — G81.02: ICD-10-CM

## 2018-03-08 DIAGNOSIS — I67.89 FLACCID HEMIPLEGIA OF LEFT DOMINANT SIDE DUE TO OTHER CEREBROVASCULAR DISEASE: ICD-10-CM

## 2018-03-08 DIAGNOSIS — R47.01 EXPRESSIVE APHASIA: Primary | ICD-10-CM

## 2018-03-08 PROCEDURE — 92507 TX SP LANG VOICE COMM INDIV: CPT | Performed by: NURSE PRACTITIONER

## 2018-03-08 PROCEDURE — 97112 NEUROMUSCULAR REEDUCATION: CPT | Performed by: PHYSICAL THERAPIST

## 2018-03-08 PROCEDURE — 97110 THERAPEUTIC EXERCISES: CPT | Performed by: PHYSICAL THERAPIST
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
## 2018-03-08 NOTE — PROGRESS NOTES
Daily Note     Today's date: 3/8/2018  Patient name: Checo Morrow  : 1955  MRN: [id number]  Referring provider: Melani Bucio DO  Dx:   Encounter Diagnosis     ICD-10-CM    1  Cerebellar infarction ([facility] [address] ) I63 9    2  Flaccid hemiplegia of left dominant side due to other cerebrovascular disease ([facility] [address] ) G81 02     I67 89      Speciality Daily Treatment Diary  Precautions:  MAFO right LE  Falls  Re-eval Date:18    Date 2/20/18 3/6/18 3/8/18     Visit Count 1/8 2/8 3/8     Pain In 0/10 See RE 0/10     Pain Out 0/10 See RE 0/10     Time In/Out 10-11:00  10-11:00       RE completed        Objective: See treatment diary below    Manual         Hamstring Stretch prn prn      Quad stretch prn prn      Iliacus Stretch prn prn                          Exercise Diary         Seated LE TE        Hip ABduction 2x10 Red TB 2x10 stand AROM      ADductions  2x10 Red TB 2x10 stand AROM      hamstring curl 2x10 Red TB 2x10 stand AROM      DF/PF  2x10 Red TB   Left only       Hip flexion 2 x 10 2 5# 2x10 stand AROM      LAQ 2 x 10 2 5#       Nu-Step L2 10 mins                Left UE TE        Biceps 5# 2 x 10 ea       Triceps 2 x 10 Red       IR 2 x 10 Red       ER 2 x 10 Red       MTP/LTP 2 x 10 Red       ABduction Elbow flex'd  5# 2 x 10               Transfer Training                Dynamic Balance  15 mins 35 mins             Ambulation 15 mins 15 mins 25 mins       Ambulation with HW - Ambulates 90 feet with HW supervision, able to clear foot, requires cues for technique for turning to sit  Curb and ramp negotiation 3x with HW and min A  Cues for foot and AD placement  45 feet x 2 with HW and CGA  Increased hip ADduction with increased fatigue, requires assist to correct  Dynamic Balance:  Standing reaching, ball toss/balloon tap on firm, foam, foam wedge (attempted - only right LE fwd)  Modalities         prn prn prn                         Assessment: Tolerated treatment well   Patient demonstrated fatigue post treatment  Fearful of falling during inclines/declines, cues for sequence/technique  LOB x 1 requiring assist to recover  Requires more assist related to increased fatigue due to right LE placement  Will continue to progress with inclines/declines as tolerated  Plan: Continue per plan of care

## 2018-03-13 ENCOUNTER — APPOINTMENT (OUTPATIENT)
Dept: SPEECH THERAPY | Facility: CLINIC | Age: 63
End: 2018-03-13
Payer: COMMERCIAL

## 2018-03-15 ENCOUNTER — OFFICE VISIT (OUTPATIENT)
Dept: SPEECH THERAPY | Facility: CLINIC | Age: 63
End: 2018-03-15
Payer: COMMERCIAL

## 2018-03-15 ENCOUNTER — OFFICE VISIT (OUTPATIENT)
Dept: PHYSICAL THERAPY | Facility: CLINIC | Age: 63
End: 2018-03-15
Payer: COMMERCIAL

## 2018-03-15 DIAGNOSIS — I67.89 FLACCID HEMIPLEGIA OF LEFT DOMINANT SIDE DUE TO OTHER CEREBROVASCULAR DISEASE: ICD-10-CM

## 2018-03-15 DIAGNOSIS — I63.9 CEREBELLAR INFARCTION (HCC): Primary | ICD-10-CM

## 2018-03-15 DIAGNOSIS — G81.02 FLACCID HEMIPLEGIA OF LEFT DOMINANT SIDE DUE TO OTHER CEREBROVASCULAR DISEASE: ICD-10-CM

## 2018-03-15 DIAGNOSIS — R47.01 EXPRESSIVE APHASIA: Primary | ICD-10-CM

## 2018-03-15 PROCEDURE — 97112 NEUROMUSCULAR REEDUCATION: CPT | Performed by: PHYSICAL THERAPIST

## 2018-03-15 PROCEDURE — 92507 TX SP LANG VOICE COMM INDIV: CPT | Performed by: NURSE PRACTITIONER

## 2018-03-15 PROCEDURE — 97110 THERAPEUTIC EXERCISES: CPT | Performed by: PHYSICAL THERAPIST

## 2018-03-15 NOTE — PROGRESS NOTES
Daily Note     Today's date: 3/15/2018  Patient name: Jewell Figueroa  : 1955  MRN: [id number]  Referring provider: Tawny Lopez DO  Dx:   Encounter Diagnosis     ICD-10-CM    1  Cerebellar infarction ([facility] [address] ) I63 9    2  Flaccid hemiplegia of left dominant side due to other cerebrovascular disease ([facility] [address] ) G81 02     I67 89      Speciality Daily Treatment Diary  Precautions:  MAFO right LE  Falls  Re-eval Date:18    Date 2/20/18 3/6/18 3/8/18 3/15/18    Visit Count 1/8 2/8 3/8 4/8    Pain In 0/10 See RE 0/10 0/10    Pain Out 0/10 See RE 0/10 0/10    Time In/Out 10-11:00  10-11:00 10-11      RE completed        Objective: See treatment diary below    Manual         Hamstring Stretch prn prn prn prn    Quad stretch prn prn prn prn    Iliacus Stretch prn prn prn prn                        Exercise Diary         Seated LE TE        Hip ABduction 2x10 Red TB 2x10 stand AROM      ADductions  2x10 Red TB 2x10 stand AROM      hamstring curl 2x10 Red TB 2x10 stand AROM      DF/PF  2x10 Red TB   Left only       Hip flexion 2 x 10 2 5# 2x10 stand AROM      LAQ 2 x 10 2 5#       Nu-Step L2 10 mins   L2 10 mins             Left UE TE        Biceps 5# 2 x 10 ea       Triceps 2 x 10 Red       IR 2 x 10 Red       ER 2 x 10 Red       MTP/LTP 2 x 10 Red       ABduction Elbow flex'd  5# 2 x 10               Transfer Training                Dynamic Balance  15 mins 35 mins 30 mins            Ambulation 15 mins 15 mins 25 mins 20 mins      Ambulation with SBQC - Ambulates 40 feet x4 with min A, able to clear foot, but requires increased weight shift  She does require cues for proper weight shift  Noted with increased fatigue  Requires increased time to complete distance, decreased stability with sequence and R/L weight shift  Dynamic Balance:  Standing reaching, ball toss/balloon tap on firm, foam   Seated T-ball forward flexion for UE ROM, noted with increased UE tone this date    Static standing balance with cues for increased weight shift to right  Min-mod A to maintain weight shift to right with assist to maintain knee extension  Noted on foam surfaces more than firm surfaces given muscle fatigue  Modalities        MH prn prn prn prn                        Assessment: Tolerated treatment fair  Patient demonstrated fatigue post treatment and would benefit from continued PT  Demonstrates more fatigue at start of session that carried throughout session  She did require increased rest periods during with shorter endurance for individual activities this session  She demonstrates decreased weight shift to right in standing and ambulation this date  Plan: Continue per plan of care
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
## 2018-03-20 ENCOUNTER — OFFICE VISIT (OUTPATIENT)
Dept: SPEECH THERAPY | Facility: CLINIC | Age: 63
End: 2018-03-20
Payer: COMMERCIAL

## 2018-03-20 ENCOUNTER — OFFICE VISIT (OUTPATIENT)
Dept: PHYSICAL THERAPY | Facility: CLINIC | Age: 63
End: 2018-03-20
Payer: COMMERCIAL

## 2018-03-20 DIAGNOSIS — G81.02 FLACCID HEMIPLEGIA OF LEFT DOMINANT SIDE DUE TO OTHER CEREBROVASCULAR DISEASE: ICD-10-CM

## 2018-03-20 DIAGNOSIS — I63.9 CEREBELLAR INFARCTION (HCC): Primary | ICD-10-CM

## 2018-03-20 DIAGNOSIS — I67.89 FLACCID HEMIPLEGIA OF LEFT DOMINANT SIDE DUE TO OTHER CEREBROVASCULAR DISEASE: ICD-10-CM

## 2018-03-20 DIAGNOSIS — I69.062: Primary | ICD-10-CM

## 2018-03-20 PROCEDURE — 92507 TX SP LANG VOICE COMM INDIV: CPT | Performed by: NURSE PRACTITIONER

## 2018-03-20 PROCEDURE — 97110 THERAPEUTIC EXERCISES: CPT | Performed by: PHYSICAL THERAPIST

## 2018-03-20 PROCEDURE — 97112 NEUROMUSCULAR REEDUCATION: CPT | Performed by: PHYSICAL THERAPIST

## 2018-03-20 NOTE — PROGRESS NOTES
Daily Note     Today's date: 3/20/2018  Patient name: Nishant Morgan  : 1955  MRN: [id number]  Referring provider: Ge George DO  Dx:   Encounter Diagnosis     ICD-10-CM    1  Cerebellar infarction ([facility] [address] ) I63 9    2  Flaccid hemiplegia of left dominant side due to other cerebrovascular disease ([facility] ) G81 02     I67 89      Speciality Daily Treatment Diary  Precautions:  MAFO right LE  Falls  Re-eval Date:18    Date 2/20/18 3/6/18 3/8/18 3/15/18 3/20/18   Visit Count 1/8 2/8 3/8 4/8 5/8   Pain In 0/10 See RE 0/10 0/10 0/10   Pain Out 0/10 See RE 0/10 0/10 0/10   Time In/Out 10-11:00  10-11:00 10- 10-11     RE completed        Objective: See treatment diary below    Manual         Hamstring Stretch prn prn prn prn prn   Quad stretch prn prn prn prn prn   Iliacus Stretch prn prn prn prn prn                       Exercise Diary         Seated LE TE        Hip ABduction 2x10 Red TB 2x10 stand AROM      ADductions  2x10 Red TB 2x10 stand AROM      hamstring curl 2x10 Red TB 2x10 stand AROM      DF/PF  2x10 Red TB   Left only       Hip flexion 2 x 10 2 5# 2x10 stand AROM      LAQ 2 x 10 2 5#       Nu-Step L2 10 mins   L2 10 mins             Left UE TE        Biceps 5# 2 x 10 ea       Triceps 2 x 10 Red       IR 2 x 10 Red       ER 2 x 10 Red       MTP/LTP 2 x 10 Red       ABduction Elbow flex'd  5# 2 x 10               Transfer Training     10 mins           Dynamic Balance  15 mins 35 mins 30 mins            Ambulation 15 mins 15 mins 25 mins 20 mins      Ambulation with SBQC - Ambulates 60 feet x1, 55 feet x 1 with Close supervision to min A, able to clear foot, but requires increased weight shift  She does require cues for proper weight shift  Noted with increased fatigue  Requires increased time to complete distance, decreased stability with sequence and R/L weight shift      Dynamic Balance:  Standing side steps with single UE support, 12 feet x6, forward/back stepping single UE support 12 feet x 4   Hip hiking level surface, hip hiking 4" step with mod assist x 2 mins each  Transfer Training:  Level surface transfers without arm rests on chair x 8 transfers, increased LE fatigue requiring min A to perform sit to stand     Modalities        MH prn prn prn prn prn                       Assessment: Tolerated treatment fair  Patient demonstrated fatigue post treatment and would benefit from continued PT  Patient does demonstrate increased LE muscle fatigue during session  Knee buckling with ambulation toward end of session  Struggles with right hip control today  Denies pain  Plan: Progress treatment as tolerated
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
## 2018-03-21 NOTE — PROGRESS NOTES
Daily Note     Today's date: 3/21/2018  Patient name: Checo Morrow  : 1955  MRN: [id number]  Referring provider: Melani Bucio DO  Dx: No diagnosis found  Speciality Daily Treatment Diary  Precautions:  MAFO right LE  Falls  Re-eval Date:18    Date 3/22/18       Visit Count 6/8       Pain In 0/10       Pain Out 0/10       Time In/Out 10:30-11:30                 Objective: See treatment diary below    Manual         Hamstring Stretch prn       Quad stretch prn       Iliacus Stretch prn                           Exercise Diary         Seated LE TE        Hip ABduction        ADductions         hamstring curl        DF/PF         Hip flexion        LAQ        Nu-Step 15 mins                Left UE TE        Biceps        Triceps        IR        ER        MTP/LTP        ABduction                Transfer Training 10 mins               Dynamic Balance 15 mins               Ambulation 30 mins         Ambulation with SBQC - Ambulates 25 feet x1, 35 feet x 1, 40 feet x 1, 20 feet x 1 with Close supervision to min A, able to clear foot, but requires increased weight shift  She does require cues for proper weight shift  Noted with increased fatigue  Requires increased time to complete distance, decreased stability with sequence and R/L weight shift  Dynamic Balance:  Step up/reach waist to shoulder height, firm surface, navigation of small spaces using HW  Transfer Training:  Level surface transfers without arm rests on chair x 9 transfers, increased LE fatigue requiring min A to perform sit to stand     Modalities         prn                           Assessment: Tolerated treatment well  Patient demonstrated fatigue post treatment and would benefit from continued PT  Possible OT consult for splinting  Panics with new tasks  Plan: Continue per plan of care

## 2018-03-22 ENCOUNTER — OFFICE VISIT (OUTPATIENT)
Dept: PHYSICAL THERAPY | Facility: CLINIC | Age: 63
End: 2018-03-22
Payer: COMMERCIAL

## 2018-03-22 ENCOUNTER — OFFICE VISIT (OUTPATIENT)
Dept: SPEECH THERAPY | Facility: CLINIC | Age: 63
End: 2018-03-22
Payer: COMMERCIAL

## 2018-03-22 DIAGNOSIS — I67.89 FLACCID HEMIPLEGIA OF LEFT DOMINANT SIDE DUE TO OTHER CEREBROVASCULAR DISEASE: ICD-10-CM

## 2018-03-22 DIAGNOSIS — I69.062: Primary | ICD-10-CM

## 2018-03-22 DIAGNOSIS — I63.9 CEREBELLAR INFARCTION (HCC): Primary | ICD-10-CM

## 2018-03-22 DIAGNOSIS — G81.02 FLACCID HEMIPLEGIA OF LEFT DOMINANT SIDE DUE TO OTHER CEREBROVASCULAR DISEASE: ICD-10-CM

## 2018-03-22 PROCEDURE — 97112 NEUROMUSCULAR REEDUCATION: CPT | Performed by: PHYSICAL THERAPIST

## 2018-03-22 PROCEDURE — 97110 THERAPEUTIC EXERCISES: CPT | Performed by: PHYSICAL THERAPIST

## 2018-03-22 PROCEDURE — 92507 TX SP LANG VOICE COMM INDIV: CPT | Performed by: NURSE PRACTITIONER
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
## 2018-03-27 ENCOUNTER — OFFICE VISIT (OUTPATIENT)
Dept: SPEECH THERAPY | Facility: CLINIC | Age: 63
End: 2018-03-27
Payer: COMMERCIAL

## 2018-03-27 ENCOUNTER — OFFICE VISIT (OUTPATIENT)
Dept: PHYSICAL THERAPY | Facility: CLINIC | Age: 63
End: 2018-03-27
Payer: COMMERCIAL

## 2018-03-27 DIAGNOSIS — I63.9 CEREBELLAR INFARCTION (HCC): Primary | ICD-10-CM

## 2018-03-27 DIAGNOSIS — I67.89 FLACCID HEMIPLEGIA OF LEFT DOMINANT SIDE DUE TO OTHER CEREBROVASCULAR DISEASE: ICD-10-CM

## 2018-03-27 DIAGNOSIS — G81.02 FLACCID HEMIPLEGIA OF LEFT DOMINANT SIDE DUE TO OTHER CEREBROVASCULAR DISEASE: ICD-10-CM

## 2018-03-27 DIAGNOSIS — I69.062: Primary | ICD-10-CM

## 2018-03-27 PROCEDURE — 92507 TX SP LANG VOICE COMM INDIV: CPT | Performed by: NURSE PRACTITIONER

## 2018-03-27 PROCEDURE — 97112 NEUROMUSCULAR REEDUCATION: CPT | Performed by: PHYSICAL THERAPIST

## 2018-03-27 PROCEDURE — 97110 THERAPEUTIC EXERCISES: CPT | Performed by: PHYSICAL THERAPIST
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
## 2018-03-27 NOTE — PROGRESS NOTES
Daily Note     Today's date: 3/27/2018  Patient name: Ed Solares  : 1955  MRN: [id number]  Referring provider: Giovanni Delgado DO  Dx:   Encounter Diagnosis     ICD-10-CM    1  Cerebellar infarction ([facility] [address] ) I63 9    2  Flaccid hemiplegia of left dominant side due to other cerebrovascular disease ([facility] [address] ) G81 02     I67 [id number]      Precautions:  MAFO right LE  Falls  Re-eval Date:18    Date 3/22/18 3/27/18      Visit Count 6/8 7/8      Pain In 0/10 0/10      Pain Out 0/10 0/10      Time In/Out 10:30-11:30 10-11:00                Subjective: Patient's family reports she was saying 3 words this weekend  She is walking "some" at home  Objective: See treatment diary below      Assessment: Tolerated treatment fair  Patient demonstrated fatigue post treatment and would benefit from continued PT  She demonstrates ongoing decrease in endurance, decreased independent weight shift to right  Max cues to maintain weight shift to right this date  Continued forced use to promote WBing and crossing midline to right on unsteady surfaces  Plan: Continue per plan of care  Manual         Hamstring Stretch prn prn      Quad stretch prn prn      Iliacus Stretch prn prn                          Exercise Diary         Seated LE TE        Hip ABduction        ADductions         hamstring curl        DF/PF         Hip flexion        LAQ        Nu-Step 15 mins                Left UE TE        Biceps        Triceps        IR        ER        MTP/LTP        ABduction                Transfer Training 10 mins 10 mins              Dynamic Balance 15 mins 15 mins              Ambulation 30 mins 25 mins        Ambulation with SBQC - Ambulates 40 feet x3 with distant supervision (2 trials using HW), 40 feet with QC  Able to clear foot, but requires increased weight shift  She does require cues for proper weight shift  Noted with increased fatigue    Requires increased time to complete distance, decreased stability with sequence and R/L weight shift  Dynamic Balance:  Step up/reach waist to shoulder height, firm surface, standing on incline bilateral LEs with cross midline reach  Standing incline on right, foam left with cross midline reach  Standing incline left with weight shift to right for balloon tap, encouraged weight shift to right with knee on right guarded for buckle due to increased fear of falling  Transfer Training:  Level surface transfers without arm rests on chair x 9 transfers, increased LE fatigue requiring min A to perform sit to stand  Cues for placement of hands with chair without arm rests      Modalities         prn

## 2018-03-29 ENCOUNTER — OFFICE VISIT (OUTPATIENT)
Dept: PHYSICAL THERAPY | Facility: CLINIC | Age: 63
End: 2018-03-29
Payer: COMMERCIAL

## 2018-03-29 ENCOUNTER — OFFICE VISIT (OUTPATIENT)
Dept: SPEECH THERAPY | Facility: CLINIC | Age: 63
End: 2018-03-29
Payer: COMMERCIAL

## 2018-03-29 DIAGNOSIS — I67.89 FLACCID HEMIPLEGIA OF LEFT DOMINANT SIDE DUE TO OTHER CEREBROVASCULAR DISEASE: Primary | ICD-10-CM

## 2018-03-29 DIAGNOSIS — G81.02 FLACCID HEMIPLEGIA OF LEFT DOMINANT SIDE DUE TO OTHER CEREBROVASCULAR DISEASE: Primary | ICD-10-CM

## 2018-03-29 DIAGNOSIS — I69.062: Primary | ICD-10-CM

## 2018-03-29 DIAGNOSIS — I63.9 CEREBELLAR INFARCTION (HCC): ICD-10-CM

## 2018-03-29 PROCEDURE — 97110 THERAPEUTIC EXERCISES: CPT | Performed by: PHYSICAL THERAPIST

## 2018-03-29 PROCEDURE — 92507 TX SP LANG VOICE COMM INDIV: CPT | Performed by: NURSE PRACTITIONER

## 2018-03-29 PROCEDURE — 97112 NEUROMUSCULAR REEDUCATION: CPT | Performed by: PHYSICAL THERAPIST
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
## 2018-03-29 NOTE — PROGRESS NOTES
Daily Note     Today's date: 3/29/2018  Patient name: Chelsy Franco  : 1955  MRN: [id number]  Referring provider: Marily Murillo DO  Dx: No diagnosis found  Precautions:  MAFO right LE  Falls  Re-eval Date:18    Date 3/22/18 3/27/18 3/29/18     Visit Count 6/8 7/8 8/8     Pain In 0/10 0/10 0/10     Pain Out 0/10 0/10 0/10     Time In/Out 10:30-11:30 10-11:00 10-11:00               Subjective: Mariaelena's family denies any changes or concerns  Objective: See treatment diary below      Assessment: Tolerated treatment fair  Patient demonstrated fatigue post treatment and would benefit from continued PT  She was hesitant initially to work with a new therapist today however with encouragement she completed her therapeutic activities as requested  She demonstrates ongoing decrease in endurance, decreased independent weight shift to right  Max cues to maintain weight shift to right this date  Plan: Continue per plan of care  Manual    3/29/18     Hamstring Stretch prn prn prn     Quad stretch prn prn prn     Iliacus Stretch prn prn prn                         Exercise Diary         Seated LE TE        Hip ABduction        ADductions         hamstring curl        DF/PF         Hip flexion        LAQ        Nu-Step 15 mins  15min              Left UE TE        Biceps        Triceps        IR        ER        MTP/LTP        ABduction                Transfer Training 10 mins 10 mins T/o session with min A             Dynamic Balance 15 mins 15 mins x10min             Ambulation 30 mins 25 mins 30min       Ambulation with SBQC - Ambulates 40 feet x4 with CGA (3 trials using HW), 1x40 feet with QC  Able to clear foot with occasional assistance to initiate, but requires increased weight shift  Noted with increased fatigue  Requires increased time to complete distance, decreased stability with sequence and R/L weight shift      Dynamic Balance: Squat to  cone with stand and right trunk rotation to reach across midline to place cone  Transfer Training:  SPT from w/c to NuStep seat with min A  Increased time and cuing required for scooting to edge of w/c seat  Level surface transfers without arm rests on chair x 7 transfers, increased LE fatigue requiring min A to perform sit to stand       Modalities         prn

## 2018-04-03 ENCOUNTER — OFFICE VISIT (OUTPATIENT)
Dept: PHYSICAL THERAPY | Facility: CLINIC | Age: 63
End: 2018-04-03
Payer: COMMERCIAL

## 2018-04-03 ENCOUNTER — APPOINTMENT (OUTPATIENT)
Dept: SPEECH THERAPY | Facility: CLINIC | Age: 63
End: 2018-04-03
Payer: COMMERCIAL

## 2018-04-03 DIAGNOSIS — I63.9 CEREBELLAR INFARCTION (HCC): ICD-10-CM

## 2018-04-03 DIAGNOSIS — I67.89 FLACCID HEMIPLEGIA OF LEFT DOMINANT SIDE DUE TO OTHER CEREBROVASCULAR DISEASE: Primary | ICD-10-CM

## 2018-04-03 DIAGNOSIS — G81.02 FLACCID HEMIPLEGIA OF LEFT DOMINANT SIDE DUE TO OTHER CEREBROVASCULAR DISEASE: Primary | ICD-10-CM

## 2018-04-03 PROCEDURE — G8981 BODY POS CURRENT STATUS: HCPCS | Performed by: PHYSICAL THERAPIST

## 2018-04-03 PROCEDURE — 97112 NEUROMUSCULAR REEDUCATION: CPT | Performed by: PHYSICAL THERAPIST

## 2018-04-03 PROCEDURE — G8982 BODY POS GOAL STATUS: HCPCS | Performed by: PHYSICAL THERAPIST

## 2018-04-03 PROCEDURE — 97110 THERAPEUTIC EXERCISES: CPT | Performed by: PHYSICAL THERAPIST

## 2018-04-03 NOTE — PROGRESS NOTES
PT Re-Evaluation     Today's date: 2018  Patient name: Robert Red  : 1955  MRN: [id number]  Referring provider: Luz Lloyd DO  Dx: CVA    Assessment  Impairments: abnormal coordination, abnormal gait, abnormal muscle tone, abnormal or restricted ROM, activity intolerance, difficulty understanding, impaired balance, impaired physical strength and safety issue    Assessment details: Robert Red is a 58 y o  female presenting to outpatient physical therapy with diagnosis of Cerebellar infarction  Patient presents with chronic weakness,  reduced balance, reduced endurance, reduced postural awareness, and reduced functional activity tolerance  She is demonstrating improved carryover of ambulation at home, but intermittently  Son and niece are supportive, patient remains fearful  She has been ambulating with HW at home more often, continues to struggle with ADLs and has decreased motivation to increase participation in these tasks  Static standing balance has improved as well as endurance, however, patient self limits  Patient has difficulty with transference of skills to a variety of tasks  Patient to benefit from skilled outpatient physical therapy to address deficits as noted, improve stability, and improve functional mobility/functional activity in order to maximize return to prior level of function with reduced limitations   Thank you for your referral     Understanding of Dx/Px/POC: good   Prognosis: good  Prognosis details: Son supportive    Goals  In 4 weeks, patient will:  Management of steps with no more than min A for 12 steps - met  Demonstrate ability to ambulate at least 75 feet with supervision, right UE supported and MAFO in place - met  Demonstrate ability to perform w/c to car transfer with no more than min A - met  Demonstrate ability to negotiate with w/c on ramp/level surface with supervision using left UE/LE - met  Demonstrate carryover for ambulation at home with family using HW for household distances - partially met  Demonstrate consistent participation in meal prep - partially met  Demonstrate ability to reach from waist to shoulder height with 5# to remove items from cabinet in kitchen - met (up to 5#)    Plan  Patient would benefit from: skilled PT  Planned therapy interventions: manual therapy, neuromuscular re-education, self care, therapeutic exercise and home exercise program  Frequency: 2x week  Duration in weeks: 4  Treatment plan discussed with: patient and family  Plan details: Will discuss transition to HEP in 1-2 weeks  Subjective Evaluation    History of Present Illness  Mechanism of injury: See [facility] for initial evaluation and last re-eval    Update:  Patient reports she is walking with HW  Up/down stairs  Not assisting with ADLs (i e  Laundry, cooking, etc )  Not a recurrent problem   Quality of life: good    Pain  No pain reported          Objective     Static Posture     Comments  Right UE flaccid since CVA    Strength/Myotome Testing     Left Hip   Planes of Motion   Flexion: 4-  Extension: 4-  Abduction: 4-  Adduction: 4-    Right Hip   Planes of Motion   Flexion: 3-  Extension: 3-  Abduction: 2  Adduction: 2    Left Knee   Flexion: 4  Extension: 4    Right Knee   Flexion: 3  Extension: 3    Left Ankle/Foot   Dorsiflexion: WFL  Plantar flexion: WFL  Additional Strength Details  ROM grossly WFL bilaterally tested in supine    Ambulation     Ambulation: Stairs   Ascend stairs: minimum assist  Pattern: non-reciprocal  Railings: one rail  Descend stairs: minimum assist  Pattern: non-reciprocal  Railings: one rail  Curbs: requires assist    Additional Stairs Ambulation Details  Requires cues for moving right LE onto step, noted with difficulty with lip of step  Comments   Ambulates on level surfaces with left HW, right MAFO in place  Demonstrates ability to walk 40-80 feet with HW or SBQC and supervision at this point      Able to clear 1" obstacles  Difficulty on compliant surfaces and difficulty with elevations if lip on step is present  Functional Assessment     Comments  Tinetti Balance and Gait Assessment  Balance Assessment:  Begin in a hard, armless chair  Sitting Balance   1= Steady, safe    Rises From Chair  1= Able to Rise, requires use of arms to help    Attempts to Rise  1= Able to Rise, requires more than one attempt    Standing Balance (First 5 seconds)  1= Steady, but uses walker or other support    Nudged  0= Begins to fall    Eyes Closed  1= Steady    Turning 360 degrees  0= Discontinuous steps    1= Steady    Sitting Down (Getting Seated)  1= Uses arms or not a smooth transition    Sub Score: 7/16    Gait Assessment:  Stand with the patient  Walk across the room at a usual pace, then rapidly    Initiation of Gait  1= No hesitancy    Step Length and Height  1= Step through left    Foot Clearance  1= Left foot clears the floor  1= Right foot clears the floor    Step Symmetry  0= Right and left step length are not equal    Step Continuity  1= Steps appear continuous    Path  1= Mild/moderate deviation OR uses a walking aid    Trunk  0= Marked sway or uses a walking aid    Walking Time  1= Heels almost touching while walking    Sub Score:  7/12  Total Score: 14/28        Flowsheet Rows    Flowsheet Row Most Recent Value   PT/OT G-Codes   Assessment Type  Re-evaluation   G code set  Changing & Maintaining Body Position   Changing and Maintaining Body Position Current Status ()  CJ   Changing and Maintaining Body Position Goal Status ()  CJ        Precautions:  MAFO right LE  Falls  Re-eval Date:5/2/18    Date 3/22/18 3/27/18 3/29/18 4/3/18    Visit Count 6/8 7/8 8/8 RE completed    Pain In 0/10 0/10 0/10 0/10    Pain Out 0/10 0/10 0/10 0/10    Time In/Out 10:30-11:30 10-11:00 10-11:00 10-11:00                Manual    3/29/18 4/3/18    Hamstring Stretch prn prn prn prn    Quad stretch prn prn prn     Iliacus Stretch prn prn prn                         Exercise Diary         Seated LE TE        Hip ABduction        ADductions         hamstring curl        DF/PF         Hip flexion        LAQ        Nu-Step 15 mins  15min 15 mins             Left UE TE        Biceps        Triceps        IR        ER        MTP/LTP        ABduction                Transfer Training 10 mins 10 mins T/o session with min A Cl supervision to min A pending LE fatigue            Dynamic Balance 15 mins 15 mins x10min             Ambulation 30 mins 25 mins 30min       Dynamic Balance: Standing alternating toe tap onto 4" step  Side step with HW and min A to advance right LE, max verbal cues      Modalities: prn

## 2018-04-03 NOTE — LETTER
2018    Haris Burton DO  [address] [address]    Patient: Yonas Sequeira   YOB: 1955   Date of Visit: 4/3/2018     Encounter Diagnosis     ICD-10-CM    1  Flaccid hemiplegia of left dominant side due to other cerebrovascular disease ([facility] [address] ) G81 02     I67 89    2  Cerebellar infarction [facility]) I63 9        Dear Dr Lesia Salcedo:    Please review the attached Plan of Care from [facility]'s recent visit  Please verify that you agree therapy should continue by signing the attached document and sending it back to our office  If you have any questions or concerns, please don't hesitate to call  Sincerely,    Christine Warner      Referring Provider:      I certify that I have read the below Plan of Care and certify the need for these services furnished under this plan of treatment while under my care  Haris Burton DO  [address] [address]: [phone]          PT Re-Evaluation     Today's date: 2018  Patient name: Yonas Sequeira  : 1955  MRN: [id number]  Referring provider: Benedict Litten, DO  Dx: CVA    Assessment  Impairments: abnormal coordination, abnormal gait, abnormal muscle tone, abnormal or restricted ROM, activity intolerance, difficulty understanding, impaired balance, impaired physical strength and safety issue    Assessment details: Yonas Sequeira is a 58 y o  female presenting to outpatient physical therapy with diagnosis of Cerebellar infarction  Patient presents with chronic weakness,  reduced balance, reduced endurance, reduced postural awareness, and reduced functional activity tolerance  She is demonstrating improved carryover of ambulation at home, but intermittently  Son and niece are supportive, patient remains fearful  She has been ambulating with HW at home more often, continues to struggle with ADLs and has decreased motivation to increase participation in these tasks    Static standing balance has improved as well as endurance, however, patient self limits  Patient has difficulty with transference of skills to a variety of tasks  Patient to benefit from skilled outpatient physical therapy to address deficits as noted, improve stability, and improve functional mobility/functional activity in order to maximize return to prior level of function with reduced limitations  Thank you for your referral     Understanding of Dx/Px/POC: good   Prognosis: good  Prognosis details: Son supportive    Goals  In 4 weeks, patient will:  Management of steps with no more than min A for 12 steps - met  Demonstrate ability to ambulate at least 75 feet with supervision, right UE supported and MAFO in place - met  Demonstrate ability to perform w/c to car transfer with no more than min A - met  Demonstrate ability to negotiate with w/c on ramp/level surface with supervision using left UE/LE - met  Demonstrate carryover for ambulation at home with family using [address] [place] [address] Dr Rodriguez for household distances - partially met  Demonstrate consistent participation in meal prep - partially met  Demonstrate ability to reach from waist to shoulder height with 5# to remove items from cabinet in kitchen - met (up to 5#)    Plan  Patient would benefit from: skilled PT  Planned therapy interventions: manual therapy, neuromuscular re-education, self care, therapeutic exercise and home exercise program  Frequency: 2x week  Duration in weeks: 4  Treatment plan discussed with: patient and family  Plan details: Will discuss transition to [facility] in 1-2 weeks  Subjective Evaluation    History of Present Illness  Mechanism of injury: See [facility] for initial evaluation and last re-eval    Update:  Patient reports she is walking with HW  Up/down stairs  Not assisting with ADLs (i e  Laundry, cooking, etc )      Not a recurrent problem   Quality of life: good    Pain  No pain reported          Objective     Static Posture     Comments  Right UE flaccid since CVA    Strength/Myotome Testing     Left Hip   Planes of Motion   Flexion: 4-  Extension: 4-  Abduction: 4-  Adduction: 4-    Right Hip   Planes of Motion   Flexion: 3-  Extension: 3-  Abduction: 2  Adduction: 2    Left Knee   Flexion: 4  Extension: 4    Right Knee   Flexion: 3  Extension: 3    Left Ankle/Foot   Dorsiflexion: WFL  Plantar flexion: WFL  Additional Strength Details  ROM grossly WFL bilaterally tested in supine    Ambulation     Ambulation: Stairs   Ascend stairs: minimum assist  Pattern: non-reciprocal  Railings: one rail  Descend stairs: minimum assist  Pattern: non-reciprocal  Railings: one rail  Curbs: requires assist    Additional Stairs Ambulation Details  Requires cues for moving right LE onto step, noted with difficulty with lip of step  Comments   Ambulates on level surfaces with left HW, right MAFO in place  Demonstrates ability to walk 40-80 feet with HW or SBQC and supervision at this point  Able to clear 1" obstacles  Difficulty on compliant surfaces and difficulty with elevations if lip on step is present  Functional Assessment     Comments  Tinetti Balance and Gait Assessment  Balance Assessment:  Begin in a hard, armless chair  Sitting Balance   1= Steady, safe    Rises From Chair  1= Able to Rise, requires use of arms to help    Attempts to Rise  1= Able to Rise, requires more than one attempt    Standing Balance (First 5 seconds)  1= Steady, but uses walker or other support    Nudged  0= Begins to fall    Eyes Closed  1= Steady    Turning 360 degrees  0= Discontinuous steps    1= Steady    Sitting Down (Getting Seated)  1= Uses arms or not a smooth transition    Sub Score: 7/16    Gait Assessment:  Stand with the patient  Walk across the room at a usual pace, then rapidly    Initiation of Gait  1= No hesitancy    Step Length and Height  1= Step through left    Foot Clearance  1= Left foot clears the floor  1= Right foot clears the floor    Step Symmetry  0= Right and left step length are not equal    Step Continuity  1= Steps appear continuous    Path  1= Mild/moderate deviation OR uses a walking aid    Trunk  0= Marked sway or uses a walking aid    Walking Time  1= Heels almost touching while walking    Sub Score:  7/12  Total Score: 14/28        Flowsheet Rows    Flowsheet Row Most Recent Value   PT/OT G-Codes   Assessment Type  Re-evaluation   G code set  Changing & Maintaining Body Position   Changing and Maintaining Body Position Current Status ()  CJ   Changing and Maintaining Body Position Goal Status ()  CJ        Precautions:  MAFO right LE  Falls  Re-eval Date:5/2/18    Date 3/22/18 3/27/18 3/29/18 4/3/18    Visit Count 6/8 7/8 8/8 RE completed    Pain In 0/10 0/10 0/10 0/10    Pain Out 0/10 0/10 0/10 0/10    Time In/Out 10:30-11:30 10-11:00 10-11:00 10-11:00                Manual    3/29/18 4/3/18    Hamstring Stretch prn prn prn prn    Quad stretch prn prn prn     Iliacus Stretch prn prn prn                         Exercise Diary         Seated LE TE        Hip ABduction        ADductions         hamstring curl        DF/PF         Hip flexion        LAQ        Nu-Step 15 mins  15min 15 mins             Left UE TE        Biceps        Triceps        IR        ER        MTP/LTP        ABduction                Transfer Training 10 mins 10 mins T/o session with min A Cl supervision to min A pending LE fatigue            Dynamic Balance 15 mins 15 mins x10min             Ambulation 30 mins 25 mins 30min       Dynamic Balance: Standing alternating toe tap onto 4" step  Side step with HW and min A to advance right LE, max verbal cues      Modalities: prn

## 2018-04-04 PROCEDURE — 97164 PT RE-EVAL EST PLAN CARE: CPT | Performed by: PHYSICAL THERAPIST

## 2018-04-05 ENCOUNTER — OFFICE VISIT (OUTPATIENT)
Dept: PHYSICAL THERAPY | Facility: CLINIC | Age: 63
End: 2018-04-05
Payer: COMMERCIAL

## 2018-04-05 DIAGNOSIS — I67.89 FLACCID HEMIPLEGIA OF LEFT DOMINANT SIDE DUE TO OTHER CEREBROVASCULAR DISEASE: Primary | ICD-10-CM

## 2018-04-05 DIAGNOSIS — G81.02 FLACCID HEMIPLEGIA OF LEFT DOMINANT SIDE DUE TO OTHER CEREBROVASCULAR DISEASE: Primary | ICD-10-CM

## 2018-04-05 DIAGNOSIS — I63.9 CEREBELLAR INFARCTION (HCC): ICD-10-CM

## 2018-04-05 PROCEDURE — 97112 NEUROMUSCULAR REEDUCATION: CPT | Performed by: PHYSICAL THERAPIST

## 2018-04-05 PROCEDURE — 97110 THERAPEUTIC EXERCISES: CPT | Performed by: PHYSICAL THERAPIST

## 2018-04-05 NOTE — PROGRESS NOTES
Daily Note     Today's date: 2018  Patient name: Isreal Davila  : 1955  MRN: [id number]  Referring provider: Luz Maria Wylie DO  Dx:   Encounter Diagnosis     ICD-10-CM    1  Flaccid hemiplegia of left dominant side due to other cerebrovascular disease ([facility] [address] ) G81 02     I67 89    2  Cerebellar infarction [facility]) I63 9      Precautions:  MAFO right LE  Falls  Re-eval Date:18    Date 3/22/18 3/27/18 3/29/18 4/3/18 4/5/18   Visit Count 8 7 RE completed    Pain In 0/10 0/10 0/10 0/10 0/10   Pain Out 0/10 0/10 0/10 0/10 0/10   Time In/Out 10:30-11:30 10-11:00 10-11:00 10-11:00 10-11             Subjective: Patient with ongoing fear of falling  Objective: See treatment diary below      Assessment: Tolerated treatment well  Patient demonstrated fatigue post treatment and would benefit from continued PT  Fatigues easily  She demonstrates continued weakness, however, did demonstrate improved right hip hike, noted particularly with stepping posteriorly  Likely discharge in 1-2 weeks  Discussed with caregiver today  Plan: Continue per plan of care  Manual    3/29/18 4/3/18    Hamstring Stretch prn prn prn prn    Quad stretch prn prn prn     Iliacus Stretch prn prn prn                         Exercise Diary         Seated LE TE        Hip ABduction        ADductions         hamstring curl        DF/PF         Hip flexion        LAQ        Nu-Step 15 mins  15min 15 mins 10 mins            Left UE TE        Biceps        Triceps        IR        ER        MTP/LTP        ABduction                Transfer Training 10 mins 10 mins T/o session with min A Cl supervision to min A pending LE fatigue            Dynamic Balance 15 mins 15 mins x10min  15 mins           Ambulation 30 mins 25 mins 30min  30 mins     Dynamic Balance: Standing on foam surface, reaching forward and crossing midline  Batting balloon, cross body      Ambulation:  Ambulates with HW for 40 feet x 1, 30 feet x 1, supervision    Ambulates with SPC 60 feet x 2, min assist   Modalities: prn

## 2018-04-10 ENCOUNTER — OFFICE VISIT (OUTPATIENT)
Dept: PHYSICAL THERAPY | Facility: CLINIC | Age: 63
End: 2018-04-10
Payer: COMMERCIAL

## 2018-04-10 DIAGNOSIS — I63.9 CEREBELLAR INFARCTION (HCC): Primary | ICD-10-CM

## 2018-04-10 PROCEDURE — 97110 THERAPEUTIC EXERCISES: CPT | Performed by: PHYSICAL THERAPIST

## 2018-04-10 PROCEDURE — 97112 NEUROMUSCULAR REEDUCATION: CPT | Performed by: PHYSICAL THERAPIST

## 2018-04-10 NOTE — PROGRESS NOTES
Daily Note     Today's date: 4/10/2018  Patient name: Luis Rico  : 1955  MRN: [id number]  Referring provider: Paty Randolph DO  Dx:   Encounter Diagnosis     ICD-10-CM    1  Cerebellar infarction [facility]) I63 9      Precautions:  MAFO right LE  Falls  Re-eval Date:18    Date 4/10/18       Visit Count 3/8       Pain In 0/10       Pain Out 0/10       Time In/Out 10:00-11:00                 Subjective: Patient's caregiver reports she feels like patient is not getting enough support at home  Objective: See treatment diary below      Assessment: Tolerated treatment well  Patient demonstrated fatigue post treatment and would benefit from continued PT  Patient continues with ongoing limitation in endurance  Agreeable to trial of ambulation outdoors  Patient struggles with inclines/declines  She will benefit from ongoing PT to maximize return to PLOF  Plan: Continue per plan of care  Manual  4/10/18       Hamstring Stretch prn       Quad stretch prn       Iliacus Stretch prn                           Exercise Diary         Seated LE TE        Hip ABduction        ADductions         hamstring curl        DF/PF         Hip flexion        LAQ        Nu-Step 15 mins  L3                Left UE TE        Biceps        Triceps        IR        ER        MTP/LTP        ABduction                Transfer Training                Dynamic Balance 10 mins               Ambulation 30 mins         Dynamic Balance: Standing on foam surface, reaching forward and crossing midline  Batting balloon, cross body  Ambulation:  Ambulates with HW for 40 feet x 2, 30 feet x 2, min A  Ambulates with SBQC 40 feet x 2, supervision  LOB x 1 indoors with CGA to recover  LOB x 1 with min A to recover outdoors    Modalities: prn

## 2018-04-12 ENCOUNTER — OFFICE VISIT (OUTPATIENT)
Dept: PHYSICAL THERAPY | Facility: CLINIC | Age: 63
End: 2018-04-12
Payer: COMMERCIAL

## 2018-04-12 ENCOUNTER — OFFICE VISIT (OUTPATIENT)
Dept: SPEECH THERAPY | Facility: CLINIC | Age: 63
End: 2018-04-12
Payer: COMMERCIAL

## 2018-04-12 DIAGNOSIS — I69.062: Primary | ICD-10-CM

## 2018-04-12 DIAGNOSIS — I63.9 CEREBELLAR INFARCTION (HCC): Primary | ICD-10-CM

## 2018-04-12 PROCEDURE — 92507 TX SP LANG VOICE COMM INDIV: CPT | Performed by: NURSE PRACTITIONER

## 2018-04-12 PROCEDURE — 97110 THERAPEUTIC EXERCISES: CPT | Performed by: PHYSICAL THERAPIST

## 2018-04-12 NOTE — PROGRESS NOTES
Daily Note     Today's date: 2018  Patient name: Marcus Keys  : 1955  MRN: [id number]  Referring provider: Nathalie Whaley DO  Dx:   Encounter Diagnosis     ICD-10-CM    1  Cerebellar infarction [facility]) I63 9      Precautions:  MAFO right LE  Falls  Re-eval Date:18    Date 4/10/18 4/12/18      Visit Count 3/8 4/8      Pain In 0/10 0/10      Pain Out 010 0/10      Time In/Out 10:00-11:00 10-11                Subjective: Patient indicates not sleeping well  Objective: See treatment diary below      Assessment: Tolerated treatment fair  Patient demonstrated fatigue post treatment and would benefit from continued PT   Ongoing endurance limitations, more pronounced this date due to lack of sleep  Patient with increased tone as well this date  Plan: Continue per plan of care  Manual  4/10/18       Hamstring Stretch prn prn      Quad stretch prn       Iliacus Stretch prn                           Exercise Diary         Seated LE TE        Hip ABduction  2x10 Red TB      ADductions   2x10      hamstring curl  11# machine  AAROM   2x10 reps      DF/PF   Left only  2x10 2#      Hip flexion  Left only  2x10 2#      LAQ  11# machine  AAROM 10 reps      Nu-Step 15 mins  L3 15 mins L2               Left UE TE        Biceps        Triceps        IR        ER        MTP/LTP        ABduction                Transfer Training                Dynamic Balance 10 mins               Ambulation 30 mins 15 mins        Dynamic Balance: Standing on foam surface, reaching forward and crossing midline  Batting balloon, cross body  Ambulation:  Ambulates with HW for 45 feet x 4 with close supervision and SBQC  More fatigued, required min A to recover from LOB this date x 1 episode
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
## 2018-04-17 ENCOUNTER — OFFICE VISIT (OUTPATIENT)
Dept: SPEECH THERAPY | Facility: CLINIC | Age: 63
End: 2018-04-17
Payer: COMMERCIAL

## 2018-04-17 ENCOUNTER — OFFICE VISIT (OUTPATIENT)
Dept: PHYSICAL THERAPY | Facility: CLINIC | Age: 63
End: 2018-04-17
Payer: COMMERCIAL

## 2018-04-17 DIAGNOSIS — I69.062: Primary | ICD-10-CM

## 2018-04-17 DIAGNOSIS — I63.9 CEREBELLAR INFARCTION (HCC): Primary | ICD-10-CM

## 2018-04-17 PROCEDURE — 97112 NEUROMUSCULAR REEDUCATION: CPT | Performed by: PHYSICAL THERAPIST

## 2018-04-17 PROCEDURE — 97110 THERAPEUTIC EXERCISES: CPT | Performed by: PHYSICAL THERAPIST

## 2018-04-17 PROCEDURE — 92507 TX SP LANG VOICE COMM INDIV: CPT | Performed by: NURSE PRACTITIONER
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
## 2018-04-19 ENCOUNTER — OFFICE VISIT (OUTPATIENT)
Dept: PHYSICAL THERAPY | Facility: CLINIC | Age: 63
End: 2018-04-19
Payer: COMMERCIAL

## 2018-04-19 ENCOUNTER — OFFICE VISIT (OUTPATIENT)
Dept: SPEECH THERAPY | Facility: CLINIC | Age: 63
End: 2018-04-19
Payer: COMMERCIAL

## 2018-04-19 DIAGNOSIS — I69.062: Primary | ICD-10-CM

## 2018-04-19 DIAGNOSIS — I63.9 CEREBELLAR INFARCTION (HCC): Primary | ICD-10-CM

## 2018-04-19 PROCEDURE — 97112 NEUROMUSCULAR REEDUCATION: CPT | Performed by: PHYSICAL THERAPIST

## 2018-04-19 PROCEDURE — 97110 THERAPEUTIC EXERCISES: CPT | Performed by: PHYSICAL THERAPIST

## 2018-04-19 PROCEDURE — 92507 TX SP LANG VOICE COMM INDIV: CPT | Performed by: NURSE PRACTITIONER
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
## 2018-04-19 NOTE — PROGRESS NOTES
PT Discharge    Today's date: 2018  Patient name: Mendez Orellana  : 1955  MRN: [id number]  Referring provider: Fred Orosco DO  Dx: CVA    Assessment  Impairments: abnormal coordination, abnormal gait, abnormal muscle tone, abnormal or restricted ROM, activity intolerance, difficulty understanding, impaired balance, impaired physical strength and safety issue    Assessment details: Mendez Orellana is a 58 y o  female presenting to outpatient physical therapy with diagnosis of Cerebellar infarction  Patient presents with chronic weakness,  reduced balance, reduced endurance, reduced postural awareness, and reduced functional activity tolerance  She is demonstrating improved carryover of ambulation at home, but intermittently  Son and cousin are supportive, patient remains fearful  She has been ambulating with HW at home more often, continues to struggle with ADLs and has decreased motivation to increase participation in these tasks  Static standing balance has improved as well as endurance, however, patient self limits  Patient has difficulty with transference of skills to a variety of tasks  Patient's family was provided for information for a community based neuro exercise program as well as supervised wellness programs that would be appropriate for patient to participate in   Thank you for your referral     Understanding of Dx/Px/POC: good   Prognosis: good    Goals  In 4 weeks, patient will:  Management of steps with no more than min A for 12 steps - met  Demonstrate ability to ambulate at least 75 feet with supervision, right UE supported and MAFO in place - met  Demonstrate ability to perform w/c to car transfer with no more than min A - met  Demonstrate ability to negotiate with w/c on ramp/level surface with supervision using left UE/LE - met  Demonstrate carryover for ambulation at home with family using HW for household distances - met  Demonstrate consistent participation in meal prep - partially met (patient would benefit from OT evaluation, however, does not want to continue with OT)  Demonstrate ability to reach from waist to shoulder height with 5# to remove items from cabinet in kitchen - met (up to 5#)    Plan  Patient would benefit from: skilled PT  Planned therapy interventions: manual therapy, neuromuscular re-education, self care, therapeutic exercise and home exercise program  Treatment plan discussed with: patient and family        Subjective Evaluation    History of Present Illness  Mechanism of injury: See [facility] for initial evaluation    Update:  Patient's cousin present and reports that patient is not doing as much as she should at home  Cousin reports patient argues with her regarding participating in wellness/fitness program   Not a recurrent problem   Quality of life: good    Pain  No pain reported          Objective     Static Posture     Comments  Right UE flaccid since CVA    Strength/Myotome Testing     Left Hip   Planes of Motion   Flexion: 4-  Extension: 4-  Abduction: 4-  Adduction: 4-    Right Hip   Planes of Motion   Flexion: 3-  Extension: 3-  Abduction: 2  Adduction: 2    Left Knee   Flexion: 4  Extension: 4    Right Knee   Flexion: 3  Extension: 3    Left Ankle/Foot   Dorsiflexion: WFL  Plantar flexion: WFL  Additional Strength Details  ROM grossly WFL bilaterally tested in supine    Ambulation     Ambulation: Stairs   Ascend stairs: minimum assist  Pattern: non-reciprocal  Railings: one rail  Descend stairs: minimum assist  Pattern: non-reciprocal  Railings: one rail  Curbs: requires assist    Additional Stairs Ambulation Details  Requires cues for moving right LE onto step, noted with difficulty with lip of step  Patient's cousin participated in family training this date to assist   Patient's cousin is a new caregiver to patient  Requested instruction  Good return demonstration      Comments   Ambulates on level surfaces with left HW, right MAFO in place  Demonstrates ability to walk 40-80 feet with HW or SBQC and supervision at this point  Able to clear 1" obstacles  Difficulty on compliant surfaces and difficulty with elevations if lip on step is present  Functional Assessment     Comments  Tinetti Balance and Gait Assessment  Last score: Total Score: 12-14/28 consistently      Precautions:  MAFO right LE  Falls  Re-eval Date:5/2/18    Date 4/10/18 4/12/18 4/17/18 4/19/19    Visit Count 3/8 4/8 5/8 6/8    Pain In 0/10 0/10 0 0    Pain Out 0/10 0/10 0 0    Time In/Out 10:00-11:00 10-11 10-11 a m  10-11                Manual  4/10/18       Hamstring Stretch prn prn prn prn    Quad stretch prn       Iliacus Stretch prn                           Exercise Diary         Seated LE TE        Hip ABduction  2x10 Red TB      ADductions   2x10      hamstring curl  11# machine  AAROM   2x10 reps      DF/PF   Left only  2x10 2#      Hip flexion  Left only  2x10 2#      LAQ  11# machine  AAROM 10 reps      Nu-Step 15 mins  L3 15 mins L2               Left UE TE        Biceps        Triceps        IR        ER        MTP/LTP        ABduction                Transfer Training                Dynamic Balance 10 mins  30 mins 30 mins            Ambulation 30 mins 15 mins 30 mins 30 mins      Ambulation:  Ambulates with SBQC for 60 feet x 4 with close supervision  Family training for HW 40 feet x 1, PT providing cues and feedback  Negotiation of stairs 4 x 2 with left HR, ascending forward/descending back x 1; descending forward x 1  Family provided support on stairs with PT standing by for input  Good return demonstration      Dynamic Balance: static standing, transfers, Tinetti

## 2018-04-24 ENCOUNTER — APPOINTMENT (OUTPATIENT)
Dept: PHYSICAL THERAPY | Facility: CLINIC | Age: 63
End: 2018-04-24
Payer: COMMERCIAL

## 2018-04-24 ENCOUNTER — OFFICE VISIT (OUTPATIENT)
Dept: SPEECH THERAPY | Facility: CLINIC | Age: 63
End: 2018-04-24
Payer: COMMERCIAL

## 2018-04-24 ENCOUNTER — TRANSCRIBE ORDERS (OUTPATIENT)
Dept: PHYSICAL THERAPY | Facility: CLINIC | Age: 63
End: 2018-04-24

## 2018-04-24 DIAGNOSIS — I63.9 CEREBELLAR INFARCTION (HCC): Primary | ICD-10-CM

## 2018-04-24 DIAGNOSIS — I69.062: Primary | ICD-10-CM

## 2018-04-24 PROCEDURE — 92507 TX SP LANG VOICE COMM INDIV: CPT
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
## 2018-04-26 ENCOUNTER — APPOINTMENT (OUTPATIENT)
Dept: PHYSICAL THERAPY | Facility: CLINIC | Age: 63
End: 2018-04-26
Payer: COMMERCIAL

## 2018-05-01 ENCOUNTER — OFFICE VISIT (OUTPATIENT)
Dept: SPEECH THERAPY | Facility: CLINIC | Age: 63
End: 2018-05-01
Payer: COMMERCIAL

## 2018-05-01 DIAGNOSIS — I69.062: Primary | ICD-10-CM

## 2018-05-01 PROCEDURE — 92507 TX SP LANG VOICE COMM INDIV: CPT | Performed by: NURSE PRACTITIONER
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
## 2018-07-16 ENCOUNTER — HOSPITAL ENCOUNTER (EMERGENCY)
Facility: HOSPITAL | Age: 63
Discharge: HOME | End: 2018-07-16
Attending: EMERGENCY MEDICINE
Payer: COMMERCIAL

## 2018-07-16 ENCOUNTER — APPOINTMENT (EMERGENCY)
Dept: CT IMAGING | Facility: HOSPITAL | Age: 63
End: 2018-07-16
Payer: COMMERCIAL

## 2018-07-16 ENCOUNTER — APPOINTMENT (EMERGENCY)
Dept: ULTRASOUND IMAGING | Facility: HOSPITAL | Age: 63
End: 2018-07-16
Payer: COMMERCIAL

## 2018-07-16 ENCOUNTER — APPOINTMENT (EMERGENCY)
Dept: RADIOLOGY | Facility: HOSPITAL | Age: 63
End: 2018-07-16
Payer: COMMERCIAL

## 2018-07-16 VITALS
TEMPERATURE: 97.5 F | HEART RATE: 76 BPM | DIASTOLIC BLOOD PRESSURE: 64 MMHG | SYSTOLIC BLOOD PRESSURE: 124 MMHG | OXYGEN SATURATION: 98 % | RESPIRATION RATE: 18 BRPM

## 2018-07-16 DIAGNOSIS — M79.606 LEG PAIN, ANTERIOR: Primary | ICD-10-CM

## 2018-07-16 DIAGNOSIS — N39.0 UTI (URINARY TRACT INFECTION): ICD-10-CM

## 2018-07-16 LAB
ALBUMIN SERPL BCP-MCNC: 3.7 G/DL (ref 3.5–5)
ALP SERPL-CCNC: 150 U/L (ref 46–116)
ALT SERPL W P-5'-P-CCNC: 75 U/L (ref 12–78)
ANION GAP SERPL CALCULATED.3IONS-SCNC: 7 MMOL/L (ref 4–13)
APTT PPP: <18 SECONDS (ref 24–36)
AST SERPL W P-5'-P-CCNC: 43 U/L (ref 5–45)
BACTERIA UR QL AUTO: ABNORMAL /HPF
BASOPHILS # BLD AUTO: 0.03 THOUSANDS/ΜL (ref 0–0.1)
BASOPHILS NFR BLD AUTO: 0 % (ref 0–1)
BILIRUB SERPL-MCNC: 0.5 MG/DL (ref 0.2–1)
BILIRUB UR QL STRIP: NEGATIVE
BUN SERPL-MCNC: 19 MG/DL (ref 5–25)
CALCIUM SERPL-MCNC: 9.5 MG/DL (ref 8.3–10.1)
CHLORIDE SERPL-SCNC: 106 MMOL/L (ref 100–108)
CLARITY UR: CLEAR
CO2 SERPL-SCNC: 30 MMOL/L (ref 21–32)
COLOR UR: ABNORMAL
CREAT SERPL-MCNC: 0.79 MG/DL (ref 0.6–1.3)
EOSINOPHIL # BLD AUTO: 0.06 THOUSAND/ΜL (ref 0–0.61)
EOSINOPHIL NFR BLD AUTO: 1 % (ref 0–6)
ERYTHROCYTE [DISTWIDTH] IN BLOOD BY AUTOMATED COUNT: 12.9 % (ref 11.6–15.1)
GFR SERPL CREATININE-BSD FRML MDRD: 80 ML/MIN/1.73SQ M
GLUCOSE SERPL-MCNC: 87 MG/DL (ref 65–140)
GLUCOSE UR STRIP-MCNC: NEGATIVE MG/DL
HCT VFR BLD AUTO: 46 % (ref 34.8–46.1)
HGB BLD-MCNC: 15.8 G/DL (ref 11.5–15.4)
HGB UR QL STRIP.AUTO: NEGATIVE
INR PPP: 1.11 (ref 0.86–1.17)
KETONES UR STRIP-MCNC: NEGATIVE MG/DL
LEUKOCYTE ESTERASE UR QL STRIP: ABNORMAL
LYMPHOCYTES # BLD AUTO: 3.05 THOUSANDS/ΜL (ref 0.6–4.47)
LYMPHOCYTES NFR BLD AUTO: 37 % (ref 14–44)
MCH RBC QN AUTO: 31.8 PG (ref 26.8–34.3)
MCHC RBC AUTO-ENTMCNC: 34.3 G/DL (ref 31.4–37.4)
MCV RBC AUTO: 93 FL (ref 82–98)
MONOCYTES # BLD AUTO: 0.48 THOUSAND/ΜL (ref 0.17–1.22)
MONOCYTES NFR BLD AUTO: 6 % (ref 4–12)
NEUTROPHILS # BLD AUTO: 4.66 THOUSANDS/ΜL (ref 1.85–7.62)
NEUTS SEG NFR BLD AUTO: 56 % (ref 43–75)
NITRITE UR QL STRIP: NEGATIVE
NON-SQ EPI CELLS URNS QL MICRO: ABNORMAL /HPF
PH UR STRIP.AUTO: 5.5 [PH] (ref 4.5–8)
PLATELET # BLD AUTO: 313 THOUSANDS/UL (ref 149–390)
PMV BLD AUTO: 10.2 FL (ref 8.9–12.7)
POTASSIUM SERPL-SCNC: 4.9 MMOL/L (ref 3.5–5.3)
PROT SERPL-MCNC: 7.6 G/DL (ref 6.4–8.2)
PROT UR STRIP-MCNC: NEGATIVE MG/DL
PROTHROMBIN TIME: 13.8 SECONDS (ref 11.8–14.2)
RBC # BLD AUTO: 4.97 MILLION/UL (ref 3.81–5.12)
RBC #/AREA URNS AUTO: ABNORMAL /HPF
SODIUM SERPL-SCNC: 143 MMOL/L (ref 136–145)
SP GR UR STRIP.AUTO: 1.01 (ref 1–1.03)
UROBILINOGEN UR QL STRIP.AUTO: 0.2 E.U./DL
WBC # BLD AUTO: 8.28 THOUSAND/UL (ref 4.31–10.16)
WBC #/AREA URNS AUTO: ABNORMAL /HPF

## 2018-07-16 PROCEDURE — 93971 EXTREMITY STUDY: CPT

## 2018-07-16 PROCEDURE — 85730 THROMBOPLASTIN TIME PARTIAL: CPT | Performed by: EMERGENCY MEDICINE

## 2018-07-16 PROCEDURE — 99284 EMERGENCY DEPT VISIT MOD MDM: CPT

## 2018-07-16 PROCEDURE — 73564 X-RAY EXAM KNEE 4 OR MORE: CPT

## 2018-07-16 PROCEDURE — 85610 PROTHROMBIN TIME: CPT | Performed by: EMERGENCY MEDICINE

## 2018-07-16 PROCEDURE — 96374 THER/PROPH/DIAG INJ IV PUSH: CPT

## 2018-07-16 PROCEDURE — 81001 URINALYSIS AUTO W/SCOPE: CPT | Performed by: EMERGENCY MEDICINE

## 2018-07-16 PROCEDURE — 80053 COMPREHEN METABOLIC PANEL: CPT | Performed by: EMERGENCY MEDICINE

## 2018-07-16 PROCEDURE — 36415 COLL VENOUS BLD VENIPUNCTURE: CPT | Performed by: EMERGENCY MEDICINE

## 2018-07-16 PROCEDURE — 96375 TX/PRO/DX INJ NEW DRUG ADDON: CPT

## 2018-07-16 PROCEDURE — 85025 COMPLETE CBC W/AUTO DIFF WBC: CPT | Performed by: EMERGENCY MEDICINE

## 2018-07-16 PROCEDURE — 74177 CT ABD & PELVIS W/CONTRAST: CPT

## 2018-07-16 RX ORDER — FENTANYL CITRATE 50 UG/ML
25 INJECTION, SOLUTION INTRAMUSCULAR; INTRAVENOUS ONCE
Status: COMPLETED | OUTPATIENT
Start: 2018-07-16 | End: 2018-07-16

## 2018-07-16 RX ORDER — NITROFURANTOIN 25; 75 MG/1; MG/1
100 CAPSULE ORAL ONCE
Status: COMPLETED | OUTPATIENT
Start: 2018-07-16 | End: 2018-07-16

## 2018-07-16 RX ORDER — ONDANSETRON 2 MG/ML
4 INJECTION INTRAMUSCULAR; INTRAVENOUS ONCE
Status: COMPLETED | OUTPATIENT
Start: 2018-07-16 | End: 2018-07-16

## 2018-07-16 RX ORDER — NITROFURANTOIN 25; 75 MG/1; MG/1
100 CAPSULE ORAL 2 TIMES DAILY
Qty: 14 CAPSULE | Refills: 0 | Status: SHIPPED | OUTPATIENT
Start: 2018-07-16 | End: 2018-07-23

## 2018-07-16 RX ADMIN — ONDANSETRON 4 MG: 2 INJECTION INTRAMUSCULAR; INTRAVENOUS at 16:59

## 2018-07-16 RX ADMIN — NITROFURANTOIN (MONOHYDRATE/MACROCRYSTALS) 100 MG: 75; 25 CAPSULE ORAL at 18:42

## 2018-07-16 RX ADMIN — FENTANYL CITRATE 25 MCG: 50 INJECTION, SOLUTION INTRAMUSCULAR; INTRAVENOUS at 16:59

## 2018-07-16 RX ADMIN — IOHEXOL 100 ML: 350 INJECTION, SOLUTION INTRAVENOUS at 17:43

## 2018-07-16 NOTE — ED PROVIDER NOTES
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  Taking?   atorvastatin (LIPITOR) 40 mg tablet   No No   Sig: Take 1 tablet by mouth daily with dinner for 30 days   cholecalciferol (VITAMIN D3) 1,000 units tablet   Yes No   Sig: Take 1,000 Units by mouth daily   ibuprofen (MOTRIN) 600 mg tablet   No No   Sig: Take 1 tablet by mouth every 8 (eight) hours as needed for mild pain or moderate pain for up to 5 days   naproxen (EC NAPROSYN) 500 MG EC tablet   No No   Sig: Take 1 tablet by mouth 2 (two) times a day with meals   potassium chloride (K-DUR,KLOR-CON) 20 mEq tablet   No No   Sig: Take 1 tablet by mouth daily for 30 days      Facility-Administered Medications: None       Past Medical History:   Diagnosis Date    CVA, old, aphasia     Medial meniscus tear     left    Mitral valve prolapse     Stroke [facility])        Past Surgical History:   Procedure Laterality Date     SECTION      FRACTURE SURGERY      HYSTERECTOMY      IL OPEN RX FEMUR FX+INTRAMED ALAYNA Left 1/14/2017    Procedure: INSERTION NAIL IM FEMUR ANTEGRADE (TROCHANTERIC); Surgeon: Maykel Munoz MD;  Location: [facility];  Service: Orthopedics    TONSILLECTOMY         Family History   Problem Relation Age of Onset    Arthritis Mother     Heart disease Father     Heart attack Father      I have reviewed and agree with the history as documented  Social History   Substance Use Topics    Smoking status: Never Smoker    Smokeless tobacco: Never Used    Alcohol use No        Review of Systems   Constitutional: Negative for activity change, appetite change and fever  Respiratory: Negative for shortness of breath  Cardiovascular: Negative for chest pain and leg swelling  Gastrointestinal: Negative for abdominal pain, diarrhea and nausea  Genitourinary: Negative for difficulty urinating  Musculoskeletal: Negative for back pain  Skin: Negative for rash and wound  Neurological: Negative for dizziness, weakness and numbness  All other systems reviewed and are negative  Physical Exam  Physical Exam   Constitutional: She appears well-developed  No distress  HENT:   Head: Normocephalic and atraumatic  Right Ear: External ear normal    Left Ear: External ear normal    Mouth/Throat: Oropharynx is clear and moist  No oropharyngeal exudate  TMS pale bilaterally   Eyes: Conjunctivae and EOM are normal  Pupils are equal, round, and reactive to light  Right eye exhibits no discharge  Left eye exhibits no discharge  No scleral icterus  Neck: Normal range of motion  Neck supple  No midline or paraspinous tenderness   Cardiovascular: Normal rate, regular rhythm and normal heart sounds  Pulmonary/Chest: Effort normal and breath sounds normal  No respiratory distress  She has no wheezes  Abdominal: Soft  Bowel sounds are normal  She exhibits no distension and no mass  There is no tenderness  There is no rebound and no guarding     Back no midline T or L spine tenderness no CVA tenderness   Musculoskeletal:        Right hip: She exhibits decreased range of motion, decreased strength and tenderness  She exhibits no bony tenderness, no swelling, no crepitus, no deformity and no laceration  Right knee: She exhibits decreased range of motion  She exhibits no swelling, no effusion, no ecchymosis, no deformity, no laceration, no erythema, normal alignment, no LCL laxity, no bony tenderness and no MCL laxity  No tenderness found  No medial joint line, no lateral joint line, no MCL, no LCL and no patellar tendon tenderness noted  Right ankle: Normal  Achilles tendon normal  Achilles tendon exhibits no pain and no defect  Legs:  Lymphadenopathy:     She has no cervical adenopathy  Neurological: She is alert  She displays normal reflexes  No cranial nerve deficit or sensory deficit  She exhibits normal muscle tone  Coordination normal    Speech and mental status at baseline per family; tranfers at baseline   Skin: Skin is warm and dry  Capillary refill takes less than 2 seconds  No rash noted  She is not diaphoretic  Psychiatric: She has a normal mood and affect  Vitals reviewed        Vital Signs  ED Triage Vitals [07/16/18 1357]   Temperature Pulse Respirations Blood Pressure SpO2   97 5 °F (36 4 °C) 66 18 117/69 96 %      Temp Source Heart Rate Source Patient Position - Orthostatic VS BP Location FiO2 (%)   Temporal Monitor Sitting Right arm --      Pain Score       7           Vitals:    07/16/18 1700 07/16/18 1730 07/16/18 1800 07/16/18 1840   BP: 124/68 115/66 113/70 124/64   Pulse: 60 61 72 76   Patient Position - Orthostatic VS: Lying Lying Lying Lying       Visual Acuity      ED Medications  Medications   ondansetron (ZOFRAN) injection 4 mg (4 mg Intravenous Given 7/16/18 1659)   fentanyl citrate (PF) 100 MCG/2ML 25 mcg (25 mcg Intravenous Given 7/16/18 1659)   iohexol (OMNIPAQUE) 350 MG/ML injection (MULTI-DOSE) 100 mL (100 mL Intravenous Given 7/16/18 1743)   nitrofurantoin (MACROBID) extended-release capsule 100 mg (100 mg Oral Given 7/16/18 1842)       Diagnostic Studies  Results Reviewed     Procedure Component Value Units Date/Time    CBC and differential [[id number]]  (Abnormal) Collected:  07/16/18 1658    Lab Status:  Final result Specimen:  Blood from Arm, Left Updated:  07/16/18 1703     WBC 8 28 Thousand/uL      RBC 4 97 Million/uL      Hemoglobin 15 8 (H) g/dL      Hematocrit 46 0 %      MCV 93 fL      MCH 31 8 pg      MCHC 34 3 g/dL      RDW 12 9 %      MPV 10 2 fL      Platelets 044 Thousands/uL      Neutrophils Relative 56 %      Lymphocytes Relative 37 %      Monocytes Relative 6 %      Eosinophils Relative 1 %      Basophils Relative 0 %      Neutrophils Absolute 4 66 Thousands/µL      Lymphocytes Absolute 3 05 Thousands/µL      Monocytes Absolute 0 48 Thousand/µL      Eosinophils Absolute 0 06 Thousand/µL      Basophils Absolute 0 03 Thousands/µL     Comprehensive metabolic panel [[id number]]  (Abnormal) Collected:  07/16/18 1520    Lab Status:  Final result Specimen:  Blood from Arm, Left Updated:  07/16/18 1549     Sodium 143 mmol/L      Potassium 4 9 mmol/L      Chloride 106 mmol/L      CO2 30 mmol/L      Anion Gap 7 mmol/L      BUN 19 mg/dL      Creatinine 0 79 mg/dL      Glucose 87 mg/dL      Calcium 9 5 mg/dL      AST 43 U/L      ALT 75 U/L      Alkaline Phosphatase 150 (H) U/L      Total Protein 7 6 g/dL      Albumin 3 7 g/dL      Total Bilirubin 0 50 mg/dL      eGFR 80 ml/min/1 73sq m     Narrative:         National Kidney Disease Education Program recommendations are as follows:  GFR calculation is accurate only with a steady state creatinine  Chronic Kidney disease less than 60 ml/min/1 73 sq  meters  Kidney failure less than 15 ml/min/1 73 sq  meters      Protime-INR [[id number]]  (Normal) Collected:  07/16/18 1520    Lab Status:  Final result Specimen:  Blood from Arm, Left Updated:  07/16/18 1540     Protime 13 8 seconds      INR 1 11    APTT [[id number]]  (Abnormal) Collected:  07/16/18 1520    Lab Status:  Final result Specimen:  Blood from Arm, Left Updated:  07/16/18 1540     PTT <18 (L) seconds     Urine Microscopic [[id number]]  (Abnormal) Collected:  07/16/18 1503    Lab Status:  Final result Specimen:  Urine from Urine, Clean Catch Updated:  07/16/18 1519     RBC, UA None Seen /hpf      WBC, UA 4-10 (A) /hpf      Epithelial Cells Occasional /hpf      Bacteria, UA Occasional /hpf     UA w Reflex to Microscopic w Reflex to Culture [[id number]]  (Abnormal) Collected:  07/16/18 1503    Lab Status:  Final result Specimen:  Urine from Urine, Clean Catch Updated:  07/16/18 1511     Color, UA Light Yellow     Clarity, UA Clear     Specific Gravity, UA 1 015     pH, UA 5 5     Leukocytes, UA Small (A)     Nitrite, UA Negative     Protein, UA Negative mg/dl      Glucose, UA Negative mg/dl      Ketones, UA Negative mg/dl      Urobilinogen, UA 0 2 E U /dl      Bilirubin, UA Negative     Blood, UA Negative                 VAS lower limb venous duplex study, unilateral/limited   Final Result by Drake Moon MD (07/17 0021)      XR knee 4+ views Right injury   ED Interpretation by Nata Knox MD (07/16 1814)   Read by me; Radiologist to provide formal interpretation  No acute fracture      Final Result by Ansley Rosas MD (07/16 1837)      No acute osseous abnormality  Workstation performed: [id number]         CT abdomen pelvis with contrast   Final Result by Jeannette Khoury MD (07/16 1813)      No acute findings in the abdomen or pelvis  Workstation performed: [id number]                    Procedures  Procedures       Phone Contacts  ED Phone Contact    ED Course  ED Course as of Jul 17 0615   Mon Jul 16, 2018   1606 Prelim venous doppler u/s neg for DVT rt leg    1643 Initally refused CT a/p family here will go thru with it  [address] findings and labs with patient  Recommend Nsaids  Will rx macrobid  Has been symptomatic with urinary frequency here                                MDM  Number of Diagnoses or Management Options  Leg pain, anterior:   UTI (urinary tract infection):   Diagnosis management comments: Mdm: dvt, pelvic fx/hip fx; radiculopathy    CritCare Time    Disposition  Final diagnoses:   Leg pain, anterior - right   UTI (urinary tract infection)     Time reflects when diagnosis was documented in both MDM as applicable and the Disposition within this note     Time User Action Codes Description Comment    7/16/2018  6:39 PM Zhang Fossa Add [M79 606] Leg pain, anterior     7/16/2018  6:39 PM Zhang Fossa Modify [Q32 977] Leg pain, anterior right    7/16/2018  6:40 PM Sergio Gomez Add [N39 0] UTI (urinary tract infection)       ED Disposition     ED Disposition Condition Comment    Discharge  Adrianne Hemphill discharge to home/self care      Condition at discharge: Good        Follow-up Information     Follow up With Specialties Details Why [address] [place] [address], DO Family Medicine In 2 days If symptoms worsen [facility]  [phone]            Discharge Medication List as of 7/16/2018  6:42 PM      START taking these medications    Details   nitrofurantoin (MACROBID) 100 mg capsule Take 1 capsule (100 mg total) by mouth 2 (two) times a day for 7 days, Starting Mon 7/16/2018, Until Mon 7/23/2018, Print         CONTINUE these medications which have NOT CHANGED    Details   atorvastatin (LIPITOR) 40 mg tablet Take 1 tablet by mouth daily with dinner for 30 days, Starting Tue 1/31/2017, Until Wed 1/17/2018, Print      cholecalciferol (VITAMIN D3) 1,000 units tablet Take 1,000 Units by mouth daily, Until Discontinued, Historical Med      ibuprofen (MOTRIN) 600 mg tablet Take 1 tablet by mouth every 8 (eight) hours as needed for mild pain or moderate pain for up to 5 days, Starting Sat 7/22/2017, Until Wed 1/17/2018, Print      naproxen (EC NAPROSYN) 500 MG EC tablet Take 1 tablet by mouth 2 (two) times a day with meals, Starting Wed 1/17/2018, Print      potassium chloride (K-DUR,KLOR-CON) 20 mEq tablet Take 1 tablet by mouth daily for 30 days, Starting Wed 2/1/2017, Until Wed 1/17/2018, Print           No discharge procedures on file      ED Provider  Electronically Signed by           Jaqueline Dan MD  07/17/18 0715

## 2018-07-16 NOTE — DISCHARGE INSTRUCTIONS
Urinary Tract Infection in Women, Ambulatory Care   GENERAL INFORMATION:   A urinary tract infection (UTI)  is caused by bacteria that get inside your urinary tract  Most bacteria that enter your urinary tract are expelled when you urinate  If the bacteria stay in your urinary tract, you may get an infection  Your urinary tract includes your kidneys, ureters, bladder, and urethra  Urine is made in your kidneys, and it flows from the ureters to the bladder  Urine leaves the bladder through the urethra  A UTI is more common in your lower urinary tract, which includes your bladder and urethra  Common symptoms include the following:   · Urinating more often or waking from sleep to urinate    · Pain or burning when you urinate    · Pain or pressure in your lower abdomen     · Urine that smells bad    · Blood in your urine    · Leaking urine  Seek immediate care for the following symptoms:   · Urinating very little or not at all    · Vomiting    · Shaking chills with a fever    · Side or back pain that gets worse  Treatment for a UTI  may include medicines to treat a bacterial infection  You may also need medicines to decrease pain and burning, or decrease the urge to urinate often  Prevent a UTI:   · Urinate when you feel the urge  Do not hold your urine  Urinate as soon as you feel you have to  · Drink liquids as directed  Ask how much liquid to drink each day and which liquids are best for you  You may need to drink more fluids than usual to help flush out the bacteria  Do not drink alcohol, caffeine, and citrus juices  These can irritate your bladder and increase your symptoms  · Apply heat  on your abdomen for 20 to 30 minutes every 2 hours for as many days as directed  Heat helps decrease discomfort and pressure in your bladder  Follow up with your healthcare provider as directed:  Write down your questions so you remember to ask them during your visits     CARE AGREEMENT:   You have the right to help plan your care  Learn about your health condition and how it may be treated  Discuss treatment options with your caregivers to decide what care you want to receive  You always have the right to refuse treatment  The above information is an  only  It is not intended as medical advice for individual conditions or treatments  Talk to your doctor, nurse or pharmacist before following any medical regimen to see if it is safe and effective for you  © 2014 [address] is for End User's use only and may not be sold, redistributed or otherwise used for commercial purposes  All illustrations and images included in CareNotes® are the copyrighted property of A D A M , Inc  or [employer]  Leg Pain   WHAT YOU NEED TO KNOW:   Leg pain may be caused by a variety of health conditions  Your tests did not show any broken bones or blood clots  DISCHARGE INSTRUCTIONS:   Return to the emergency department if:   · You have a fever  · Your leg starts to swell  · Your leg pain gets worse  · You have numbness or tingling in your leg or toes  · You cannot put any weight on or move your leg  Contact your healthcare provider if:   · Your pain does not decrease, even after treatment  · You have questions or concerns about your condition or care  Medicines:   · NSAIDs , such as ibuprofen, help decrease swelling, pain, and fever  This medicine is available with or without a doctor's order  NSAIDs can cause stomach bleeding or kidney problems in certain people  If you take blood thinner medicine, always ask your healthcare provider if NSAIDs are safe for you  Always read the medicine label and follow directions  · Take your medicine as directed  Contact your healthcare provider if you think your medicine is not helping or if you have side effects  Tell him of her if you are allergic to any medicine  Keep a list of the medicines, vitamins, and herbs you take  Include the amounts, and when and why you take them  Bring the list or the pill bottles to follow-up visits  Carry your medicine list with you in case of an emergency  Follow up with your healthcare provider as directed: You may need more tests to find the cause of your leg pain  You may need to see an orthopedic specialist or a physical therapist  Write down your questions so you remember to ask them during your visits  Manage your leg pain:   · Rest  your injured leg so that it can heal  You may need an immobilizer, brace, or splint to limit the movement of your leg  You may need to avoid putting any weight on your leg for at least 48 hours  Return to normal activities as directed  · Ice  the injury for 20 minutes every 4 hours for up to 24 hours, or as directed  Use an ice pack, or put crushed ice in a plastic bag  Cover it with a towel to protect your skin  Ice helps prevent tissue damage and decreases swelling and pain  · Elevate  your injured leg above the level of your heart as often as you can  This will help decrease swelling and pain  If possible, prop your leg on pillows or blankets to keep the area elevated comfortably  · Use assistive devices as directed  You may need to use a cane or crutches  Assistive devices help decrease pain and pressure on your leg when you walk  Ask your healthcare provider for more information about assistive devices and how to use them correctly  · Maintain a healthy weight  Extra body weight can cause pressure and pain in your hip, knee, and ankle joints  Ask your healthcare provider how much you should weigh  Ask him to help you create a weight loss plan if you are overweight  © 2017 [address] [facility] Information is for End User's use only and may not be sold, redistributed or otherwise used for commercial purposes  All illustrations and images included in CareNotes® are the copyrighted property of A D A M , Inc  or [employer]undo Meyer    The above information is an  only  It is not intended as medical advice for individual conditions or treatments  Talk to your doctor, nurse or pharmacist before following any medical regimen to see if it is safe and effective for you  Aleve 1 tabs twice daily with food OR ibuprofen 200-400mg every 8 hours with food as needed for pain  Famotidine (pecid) 20mg twice daily OR ranitidine (Zantac) 150mg twice daily to cut stomach acid    Finish macrobid for bladder infection

## 2018-07-17 PROCEDURE — 93971 EXTREMITY STUDY: CPT | Performed by: SURGERY

## 2018-11-29 ENCOUNTER — APPOINTMENT (OUTPATIENT)
Dept: LAB | Facility: HOSPITAL | Age: 63
End: 2018-11-29
Payer: COMMERCIAL

## 2018-11-29 ENCOUNTER — TRANSCRIBE ORDERS (OUTPATIENT)
Dept: ADMINISTRATIVE | Facility: HOSPITAL | Age: 63
End: 2018-11-29

## 2018-11-29 DIAGNOSIS — Z13.9 SCREENING FOR CONDITION: Primary | ICD-10-CM

## 2018-11-29 DIAGNOSIS — E55.9 VITAMIN D DEFICIENCY, UNSPECIFIED: ICD-10-CM

## 2018-11-29 DIAGNOSIS — Z13.9 SCREENING FOR CONDITION: ICD-10-CM

## 2018-11-29 LAB
25(OH)D3 SERPL-MCNC: 33.7 NG/ML (ref 30–100)
ALBUMIN SERPL BCP-MCNC: 3.4 G/DL (ref 3.5–5)
ALP SERPL-CCNC: 140 U/L (ref 46–116)
ALT SERPL W P-5'-P-CCNC: 70 U/L (ref 12–78)
ANION GAP SERPL CALCULATED.3IONS-SCNC: 8 MMOL/L (ref 4–13)
AST SERPL W P-5'-P-CCNC: 37 U/L (ref 5–45)
BASOPHILS # BLD AUTO: 0.02 THOUSANDS/ΜL (ref 0–0.1)
BASOPHILS NFR BLD AUTO: 0 % (ref 0–1)
BILIRUB SERPL-MCNC: 0.5 MG/DL (ref 0.2–1)
BUN SERPL-MCNC: 16 MG/DL (ref 5–25)
CALCIUM SERPL-MCNC: 9.2 MG/DL (ref 8.3–10.1)
CHLORIDE SERPL-SCNC: 105 MMOL/L (ref 100–108)
CHOLEST SERPL-MCNC: 133 MG/DL (ref 50–200)
CO2 SERPL-SCNC: 30 MMOL/L (ref 21–32)
CREAT SERPL-MCNC: 0.62 MG/DL (ref 0.6–1.3)
EOSINOPHIL # BLD AUTO: 0.05 THOUSAND/ΜL (ref 0–0.61)
EOSINOPHIL NFR BLD AUTO: 1 % (ref 0–6)
ERYTHROCYTE [DISTWIDTH] IN BLOOD BY AUTOMATED COUNT: 13 % (ref 11.6–15.1)
GFR SERPL CREATININE-BSD FRML MDRD: 97 ML/MIN/1.73SQ M
GLUCOSE SERPL-MCNC: 78 MG/DL (ref 65–140)
HCT VFR BLD AUTO: 45.8 % (ref 34.8–46.1)
HDLC SERPL-MCNC: 70 MG/DL (ref 40–60)
HGB BLD-MCNC: 15.3 G/DL (ref 11.5–15.4)
IMM GRANULOCYTES # BLD AUTO: 0.01 THOUSAND/UL (ref 0–0.2)
IMM GRANULOCYTES NFR BLD AUTO: 0 % (ref 0–2)
LDLC SERPL CALC-MCNC: 49 MG/DL (ref 0–100)
LYMPHOCYTES # BLD AUTO: 2.38 THOUSANDS/ΜL (ref 0.6–4.47)
LYMPHOCYTES NFR BLD AUTO: 43 % (ref 14–44)
MCH RBC QN AUTO: 31.5 PG (ref 26.8–34.3)
MCHC RBC AUTO-ENTMCNC: 33.4 G/DL (ref 31.4–37.4)
MCV RBC AUTO: 94 FL (ref 82–98)
MONOCYTES # BLD AUTO: 0.32 THOUSAND/ΜL (ref 0.17–1.22)
MONOCYTES NFR BLD AUTO: 6 % (ref 4–12)
NEUTROPHILS # BLD AUTO: 2.73 THOUSANDS/ΜL (ref 1.85–7.62)
NEUTS SEG NFR BLD AUTO: 50 % (ref 43–75)
NONHDLC SERPL-MCNC: 63 MG/DL
NRBC BLD AUTO-RTO: 0 /100 WBCS
PLATELET # BLD AUTO: 298 THOUSANDS/UL (ref 149–390)
PMV BLD AUTO: 9.3 FL (ref 8.9–12.7)
POTASSIUM SERPL-SCNC: 4.3 MMOL/L (ref 3.5–5.3)
PROT SERPL-MCNC: 7 G/DL (ref 6.4–8.2)
RBC # BLD AUTO: 4.85 MILLION/UL (ref 3.81–5.12)
SODIUM SERPL-SCNC: 143 MMOL/L (ref 136–145)
TRIGL SERPL-MCNC: 70 MG/DL
TSH SERPL DL<=0.05 MIU/L-ACNC: 3.2 UIU/ML (ref 0.36–3.74)
WBC # BLD AUTO: 5.51 THOUSAND/UL (ref 4.31–10.16)

## 2018-11-29 PROCEDURE — 85025 COMPLETE CBC W/AUTO DIFF WBC: CPT

## 2018-11-29 PROCEDURE — 80061 LIPID PANEL: CPT

## 2018-11-29 PROCEDURE — 80053 COMPREHEN METABOLIC PANEL: CPT

## 2018-11-29 PROCEDURE — 36415 COLL VENOUS BLD VENIPUNCTURE: CPT

## 2018-11-29 PROCEDURE — 82306 VITAMIN D 25 HYDROXY: CPT

## 2018-11-29 PROCEDURE — 84443 ASSAY THYROID STIM HORMONE: CPT

## 2019-02-23 ENCOUNTER — HOSPITAL ENCOUNTER (INPATIENT)
Facility: HOSPITAL | Age: 64
LOS: 1 days | Discharge: HOME | DRG: 313 | End: 2019-02-27
Attending: EMERGENCY MEDICINE | Admitting: FAMILY MEDICINE
Payer: COMMERCIAL

## 2019-02-23 ENCOUNTER — APPOINTMENT (EMERGENCY)
Dept: RADIOLOGY | Facility: HOSPITAL | Age: 64
DRG: 313 | End: 2019-02-23
Payer: COMMERCIAL

## 2019-02-23 DIAGNOSIS — R07.9 CHEST PAIN: Primary | ICD-10-CM

## 2019-02-23 LAB
ALBUMIN SERPL BCP-MCNC: 3.7 G/DL (ref 3.5–5)
ALP SERPL-CCNC: 164 U/L (ref 46–116)
ALT SERPL W P-5'-P-CCNC: 62 U/L (ref 12–78)
ANION GAP SERPL CALCULATED.3IONS-SCNC: 8 MMOL/L (ref 4–13)
APTT PPP: 23 SECONDS (ref 26–38)
AST SERPL W P-5'-P-CCNC: 40 U/L (ref 5–45)
BASOPHILS # BLD AUTO: 0.03 THOUSANDS/ΜL (ref 0–0.1)
BASOPHILS NFR BLD AUTO: 1 % (ref 0–1)
BILIRUB SERPL-MCNC: 0.6 MG/DL (ref 0.2–1)
BUN SERPL-MCNC: 14 MG/DL (ref 5–25)
CALCIUM SERPL-MCNC: 9.4 MG/DL (ref 8.3–10.1)
CHLORIDE SERPL-SCNC: 105 MMOL/L (ref 100–108)
CO2 SERPL-SCNC: 30 MMOL/L (ref 21–32)
CREAT SERPL-MCNC: 0.79 MG/DL (ref 0.6–1.3)
DEPRECATED D DIMER PPP: 421 NG/ML (FEU)
EOSINOPHIL # BLD AUTO: 0.06 THOUSAND/ΜL (ref 0–0.61)
EOSINOPHIL NFR BLD AUTO: 1 % (ref 0–6)
ERYTHROCYTE [DISTWIDTH] IN BLOOD BY AUTOMATED COUNT: 12.7 % (ref 11.6–15.1)
GFR SERPL CREATININE-BSD FRML MDRD: 80 ML/MIN/1.73SQ M
GLUCOSE SERPL-MCNC: 80 MG/DL (ref 65–140)
HCT VFR BLD AUTO: 46.9 % (ref 34.8–46.1)
HGB BLD-MCNC: 15.2 G/DL (ref 11.5–15.4)
IMM GRANULOCYTES # BLD AUTO: 0.01 THOUSAND/UL (ref 0–0.2)
IMM GRANULOCYTES NFR BLD AUTO: 0 % (ref 0–2)
INR PPP: 0.95 (ref 0.86–1.17)
LYMPHOCYTES # BLD AUTO: 2.49 THOUSANDS/ΜL (ref 0.6–4.47)
LYMPHOCYTES NFR BLD AUTO: 44 % (ref 14–44)
MAGNESIUM SERPL-MCNC: 2.1 MG/DL (ref 1.6–2.6)
MCH RBC QN AUTO: 30.7 PG (ref 26.8–34.3)
MCHC RBC AUTO-ENTMCNC: 32.4 G/DL (ref 31.4–37.4)
MCV RBC AUTO: 95 FL (ref 82–98)
MONOCYTES # BLD AUTO: 0.37 THOUSAND/ΜL (ref 0.17–1.22)
MONOCYTES NFR BLD AUTO: 7 % (ref 4–12)
NEUTROPHILS # BLD AUTO: 2.75 THOUSANDS/ΜL (ref 1.85–7.62)
NEUTS SEG NFR BLD AUTO: 47 % (ref 43–75)
NRBC BLD AUTO-RTO: 0 /100 WBCS
NT-PROBNP SERPL-MCNC: 73 PG/ML
PLATELET # BLD AUTO: 267 THOUSANDS/UL (ref 149–390)
PLATELET # BLD AUTO: 301 THOUSANDS/UL (ref 149–390)
PMV BLD AUTO: 9.3 FL (ref 8.9–12.7)
PMV BLD AUTO: 9.6 FL (ref 8.9–12.7)
POTASSIUM SERPL-SCNC: 4.1 MMOL/L (ref 3.5–5.3)
PROT SERPL-MCNC: 7.6 G/DL (ref 6.4–8.2)
PROTHROMBIN TIME: 12.2 SECONDS (ref 11.8–14.2)
RBC # BLD AUTO: 4.95 MILLION/UL (ref 3.81–5.12)
SODIUM SERPL-SCNC: 143 MMOL/L (ref 136–145)
TROPONIN I SERPL-MCNC: <0.02 NG/ML
TROPONIN I SERPL-MCNC: <0.02 NG/ML
WBC # BLD AUTO: 5.71 THOUSAND/UL (ref 4.31–10.16)

## 2019-02-23 PROCEDURE — 80053 COMPREHEN METABOLIC PANEL: CPT | Performed by: PHYSICIAN ASSISTANT

## 2019-02-23 PROCEDURE — 84484 ASSAY OF TROPONIN QUANT: CPT | Performed by: PHYSICIAN ASSISTANT

## 2019-02-23 PROCEDURE — 84484 ASSAY OF TROPONIN QUANT: CPT | Performed by: INTERNAL MEDICINE

## 2019-02-23 PROCEDURE — 83735 ASSAY OF MAGNESIUM: CPT | Performed by: PHYSICIAN ASSISTANT

## 2019-02-23 PROCEDURE — 99285 EMERGENCY DEPT VISIT HI MDM: CPT

## 2019-02-23 PROCEDURE — 71045 X-RAY EXAM CHEST 1 VIEW: CPT

## 2019-02-23 PROCEDURE — 85025 COMPLETE CBC W/AUTO DIFF WBC: CPT | Performed by: PHYSICIAN ASSISTANT

## 2019-02-23 PROCEDURE — 93005 ELECTROCARDIOGRAM TRACING: CPT

## 2019-02-23 PROCEDURE — 99220 PR INITIAL OBSERVATION CARE/DAY 70 MINUTES: CPT | Performed by: NURSE PRACTITIONER

## 2019-02-23 PROCEDURE — 85730 THROMBOPLASTIN TIME PARTIAL: CPT | Performed by: PHYSICIAN ASSISTANT

## 2019-02-23 PROCEDURE — 36415 COLL VENOUS BLD VENIPUNCTURE: CPT | Performed by: PHYSICIAN ASSISTANT

## 2019-02-23 PROCEDURE — 85379 FIBRIN DEGRADATION QUANT: CPT | Performed by: PHYSICIAN ASSISTANT

## 2019-02-23 PROCEDURE — 83880 ASSAY OF NATRIURETIC PEPTIDE: CPT | Performed by: PHYSICIAN ASSISTANT

## 2019-02-23 PROCEDURE — 85049 AUTOMATED PLATELET COUNT: CPT | Performed by: NURSE PRACTITIONER

## 2019-02-23 PROCEDURE — 85610 PROTHROMBIN TIME: CPT | Performed by: PHYSICIAN ASSISTANT

## 2019-02-23 RX ORDER — ESCITALOPRAM OXALATE 10 MG/1
1 TABLET ORAL DAILY
COMMUNITY
End: 2020-06-12 | Stop reason: SDUPTHER

## 2019-02-23 RX ORDER — SODIUM CHLORIDE 9 MG/ML
125 INJECTION, SOLUTION INTRAVENOUS CONTINUOUS
Status: DISCONTINUED | OUTPATIENT
Start: 2019-02-23 | End: 2019-02-24

## 2019-02-23 RX ORDER — ATORVASTATIN CALCIUM 40 MG/1
40 TABLET, FILM COATED ORAL DAILY
Status: DISCONTINUED | OUTPATIENT
Start: 2019-02-24 | End: 2019-02-27 | Stop reason: HOSPADM

## 2019-02-23 RX ORDER — ALBUTEROL SULFATE 2.5 MG/3ML
2.5 SOLUTION RESPIRATORY (INHALATION) EVERY 6 HOURS PRN
Status: DISCONTINUED | OUTPATIENT
Start: 2019-02-23 | End: 2019-02-27 | Stop reason: HOSPADM

## 2019-02-23 RX ORDER — ATORVASTATIN CALCIUM 40 MG/1
40 TABLET, FILM COATED ORAL
Status: DISCONTINUED | OUTPATIENT
Start: 2019-02-23 | End: 2019-02-23

## 2019-02-23 RX ORDER — POTASSIUM CHLORIDE 20 MEQ/1
1 TABLET, EXTENDED RELEASE ORAL DAILY
COMMUNITY
End: 2020-06-12 | Stop reason: SDUPTHER

## 2019-02-23 RX ORDER — ASPIRIN 81 MG/1
324 TABLET ORAL DAILY
Status: DISCONTINUED | OUTPATIENT
Start: 2019-02-24 | End: 2019-02-27 | Stop reason: HOSPADM

## 2019-02-23 RX ORDER — ACETAMINOPHEN 325 MG/1
650 TABLET ORAL EVERY 6 HOURS PRN
Status: DISCONTINUED | OUTPATIENT
Start: 2019-02-23 | End: 2019-02-27 | Stop reason: HOSPADM

## 2019-02-23 RX ORDER — ASPIRIN 81 MG/1
324 TABLET, CHEWABLE ORAL ONCE
Status: COMPLETED | OUTPATIENT
Start: 2019-02-23 | End: 2019-02-23

## 2019-02-23 RX ORDER — MELATONIN
1000 DAILY
Status: DISCONTINUED | OUTPATIENT
Start: 2019-02-24 | End: 2019-02-27 | Stop reason: HOSPADM

## 2019-02-23 RX ORDER — KETOROLAC TROMETHAMINE 30 MG/ML
15 INJECTION, SOLUTION INTRAMUSCULAR; INTRAVENOUS ONCE
Status: COMPLETED | OUTPATIENT
Start: 2019-02-23 | End: 2019-02-23

## 2019-02-23 RX ORDER — ESCITALOPRAM OXALATE 10 MG/1
10 TABLET ORAL DAILY
Status: DISCONTINUED | OUTPATIENT
Start: 2019-02-24 | End: 2019-02-27 | Stop reason: HOSPADM

## 2019-02-23 RX ORDER — NITROGLYCERIN 0.4 MG/1
0.4 TABLET SUBLINGUAL
Status: DISCONTINUED | OUTPATIENT
Start: 2019-02-23 | End: 2019-02-27 | Stop reason: HOSPADM

## 2019-02-23 RX ADMIN — ASPIRIN 81 MG 324 MG: 81 TABLET ORAL at 12:18

## 2019-02-23 RX ADMIN — KETOROLAC TROMETHAMINE 15 MG: 30 INJECTION, SOLUTION INTRAMUSCULAR at 13:05

## 2019-02-23 RX ADMIN — SODIUM CHLORIDE 125 ML/HR: 0.9 INJECTION, SOLUTION INTRAVENOUS at 23:51

## 2019-02-23 RX ADMIN — SODIUM CHLORIDE 125 ML/HR: 0.9 INJECTION, SOLUTION INTRAVENOUS at 12:44

## 2019-02-23 NOTE — RESPIRATORY THERAPY NOTE
RT Protocol Note  Kaelyn Pichardo 61 y o  female MRN: [id number]  Unit/Bed#: 162-24 Encounter: [id number]    Assessment    Active Problems:    Hypertension    Expressive aphasia    Chest pain at rest      Home Pulmonary Medications:  None       Past Medical History:   Diagnosis Date    CVA, old, aphasia     Hypertension     Medial meniscus tear     left    Mitral valve prolapse     Stroke [facility])      Social History     Socioeconomic History    Marital status:      Spouse name: None    Number of children: None    Years of education: None    Highest education level: None   Occupational History    None   Social Needs    Financial resource strain: None    Food insecurity:     Worry: None     Inability: None    Transportation needs:     Medical: None     Non-medical: None   Tobacco Use    Smoking status: Never Smoker    Smokeless tobacco: Never Used   Substance and Sexual Activity    Alcohol use: Yes     Frequency: Monthly or less    Drug use: No    Sexual activity: Not Currently   Lifestyle    Physical activity:     Days per week: None     Minutes per session: None    Stress: None   Relationships    Social connections:     Talks on phone: None     Gets together: None     Attends Sabianist service: None     Active member of club or organization: None     Attends meetings of clubs or organizations: None     Relationship status: None    Intimate partner violence:     Fear of current or ex partner: None     Emotionally abused: None     Physically abused: None     Forced sexual activity: None   Other Topics Concern    None   Social History Narrative    None       Subjective         Objective    Physical Exam:   Assessment Type: Assess only  General Appearance: Alert, Awake  Respiratory Pattern: Normal  Chest Assessment: Chest expansion symmetrical  Bilateral Breath Sounds: Clear  Cough: None  O2 Device: room air    Vitals:  Blood pressure 114/67, pulse 79, temperature (!) 96 6 °F (35 9 °C), temperature source Temporal, resp  rate 16, height 5' 4" (1 626 m), weight 72 kg (158 lb 11 7 oz), SpO2 97 %  Imaging and other studies: I have personally reviewed pertinent reports        O2 Device: room air     Plan  Respiratory  Albuterol 0 083% Q6  PRN for shortness of breath and wheeze           Resp Comments: pt  cannot speak, family member present

## 2019-02-23 NOTE — H&P
H&P- Luz Snow 1955, 61 y o  female MRN: [id number]    Unit/Bed#: RM14 Encounter: [id number]    Primary Care Provider: Lola Cardoso DO   Date and time admitted to hospital: 2/23/2019 11:57 AM        Chest pain at rest  Assessment & Plan  Left midsternal chest pain-no radiation  Trend troponin-1/3 negative trend EKG with troponins- initial EKG NSR  Admit to med surge with telemetry  Echo ordered  Nuclear  med stress test ordered  Provide supportive care    Expressive aphasia  Assessment & Plan  Patient does have chronic baseline deficits due to prior stroke including right -sided weakness +1 Upper and Lower EXT  Aphasia       Hypertension  Assessment & Plan  Vital signs currently stable  Admit to med surge with telemetry  Monitor per protocol            VTE Prophylaxis: Enoxaparin (Lovenox)  / sequential compression device   Code Status: FULL  POLST: POLST is not applicable to this patient    Anticipated Length of Stay:  Patient will be admitted on an Observation basis with an anticipated length of stay of  Less than 2 midnights  Justification for Hospital Stay: r/o STEMI    Total Time for Visit, including Counseling / Coordination of Care: 1 hour  Greater than 50% of this total time spent on direct patient counseling and coordination of care  Chief Complaint:   Chest pain at rest    History of Present Illness:    Luz Snow is a 61 y o  female who presented to the ER with CC of chest pain at rest    Images and labs were collected in ER- See below  During my exam patient admits to left sternal chest pain without radiation  With palpation on the left sternal border patient winced, patient denies any lightheadedness dizziness, denies vomiting admits to 1 episode of nausea denies blurred vision  Patient denies constipation or diarrhea, hematochezia or melena dysuria urgency or frequency    Patient is status post CVA resulting in right-sided deficits including trace movement on right upper and lower extremity, decreased sensation on right upper and lower extremity, aphasia  Patient is alert and oriented answers appropriately with yes and no questions  Patient was admitted by emergency room doctor  Review of Systems:    Review of Systems   Cardiovascular: Positive for chest pain (Left sternal border no radiation)  Gastrointestinal: Positive for nausea  Musculoskeletal:        Right upper and lower extremity decreased strength   All other systems reviewed and are negative  Past Medical and Surgical History:     Past Medical History:   Diagnosis Date    CVA, old, aphasia     Medial meniscus tear     left    Mitral valve prolapse     Stroke [facility])        Past Surgical History:   Procedure Laterality Date     SECTION      FRACTURE SURGERY      HYSTERECTOMY      [place] OPEN RX FEMUR FX+INTRAMED ALAYNA Left 2017    Procedure: INSERTION NAIL IM FEMUR ANTEGRADE (TROCHANTERIC); Surgeon: Iban Otto MD;  [facility];  Service: Orthopedics    TONSILLECTOMY         Meds/Allergies:    Prior to Admission medications    Medication Sig Start Date End Date Taking?  Authorizing Provider   atorvastatin (LIPITOR) 40 mg tablet Take 1 tablet by mouth daily with dinner for 30 days 17 Yes Sahara Mcdermott MD   cholecalciferol (VITAMIN D3) 1,000 units tablet Take 1,000 Units by mouth daily   Yes Historical Provider, MD   escitalopram (LEXAPRO) 10 mg tablet Take 1 tablet by mouth daily   Yes Historical Provider, MD   potassium chloride (K-DUR,KLOR-CON) 20 mEq tablet Take 1 tablet by mouth daily   Yes Historical Provider, MD   ibuprofen (MOTRIN) 600 mg tablet Take 1 tablet by mouth every 8 (eight) hours as needed for mild pain or moderate pain for up to 5 days 17  Jarrod Camp DO   naproxen (EC NAPROSYN) 500 MG EC tablet Take 1 tablet by mouth 2 (two) times a day with meals 18  Ang Hanley MD     I have reviewed home medications with patient personally  Allergies: No Active Allergies    Social History:     Marital Status:    Occupation: disability   Patient Pre-hospital Living Situation: w/ son  Patient Pre-hospital Level of Mobility: WC bound  Patient Pre-hospital Diet Restrictions: none  Substance Use History:   Social History     Substance and Sexual Activity   Alcohol Use No     Social History     Tobacco Use   Smoking Status Never Smoker   Smokeless Tobacco Never Used     Social History     Substance and Sexual Activity   Drug Use No       Family History:    Family History   Problem Relation Age of Onset    Arthritis Mother     Heart disease Father     Heart attack Father        Physical Exam:     Vitals:   Blood Pressure: 116/64 (02/23/19 1300)  Pulse: 64 (02/23/19 1300)  Temperature: 98 °F (36 7 °C) (02/23/19 1201)  Temp Source: Temporal (02/23/19 1201)  Respirations: (!) 24 (02/23/19 1300)  Height: 5' 4" (162 6 cm) (02/23/19 1201)  Weight - Scale: 71 8 kg (158 lb 4 6 oz) (02/23/19 1201)  SpO2: 99 % (02/23/19 1300)    Physical Exam   Constitutional: She is oriented to person, place, and time  She appears well-developed and well-nourished  She appears ill  No distress  Eyes: Pupils are equal, round, and reactive to light  EOM are normal  Right eye exhibits no discharge  Left eye exhibits no discharge  Neck: Normal range of motion  Neck supple  No JVD present  No tracheal deviation present  No thyromegaly present  Cardiovascular: Normal rate, regular rhythm, normal heart sounds and intact distal pulses  Exam reveals no gallop and no friction rub  No murmur heard  Pulmonary/Chest: Effort normal and breath sounds normal  No stridor  No respiratory distress  She has no wheezes  Abdominal: Soft  Bowel sounds are normal  She exhibits no distension  There is no tenderness  There is no guarding  Musculoskeletal:        Right shoulder: Decreased strength: +1 Right upper and lower s/p CVA     Neurological: She is alert and oriented to person, place, and time  A sensory deficit (righ upper an lower ext) is present  Abnormal gait: impaired gait  - wc bound  GCS eye subscore is 4  GCS verbal subscore is 5  GCS motor subscore is 6  Answers approprietly to yes and no questions    Skin: She is not diaphoretic  Additional Data:     Lab Results: I have personally reviewed pertinent reports  Results from last 7 days   Lab Units 02/23/19  1212   WBC Thousand/uL 5 71   HEMOGLOBIN g/dL 15 2   HEMATOCRIT % 46 9*   PLATELETS Thousands/uL 301   NEUTROS PCT % 47   LYMPHS PCT % 44   MONOS PCT % 7   EOS PCT % 1     Results from last 7 days   Lab Units 02/23/19  1212   POTASSIUM mmol/L 4 1   CHLORIDE mmol/L 105   CO2 mmol/L 30   BUN mg/dL 14   CREATININE mg/dL 0 79   CALCIUM mg/dL 9 4   ALK PHOS U/L 164*   ALT U/L 62   AST U/L 40     Results from last 7 days   Lab Units 02/23/19  1212   INR  0 95       Imaging: I have personally reviewed pertinent reports  No results found  EKG, Pathology, and Other Studies Reviewed on Admission:   · EKG: reviewed - NSR    Allscripts / Epic Records Reviewed: Yes     ** Please Note: This note has been constructed using a voice recognition system   **

## 2019-02-23 NOTE — ASSESSMENT & PLAN NOTE
Patient does have chronic baseline deficits due to prior stroke including right -sided weakness +1 Upper and Lower EXT  Aphasia

## 2019-02-23 NOTE — ASSESSMENT & PLAN NOTE
Left midsternal chest pain-no radiation  Trend troponin-1/3 negative trend EKG with troponins- initial EKG NSR  Admit to med surge with telemetry  Echo ordered  Nuclear  med stress test ordered  Provide supportive care

## 2019-02-23 NOTE — ED PROVIDER NOTES
History  Chief Complaint   Patient presents with    Chest Pain     Chest pain left anterior  started 1 hour ago  Pain goes into back  Also sob  Patient presents to the emergency department today with her mother and her son  Between the 3 them the all provide the history  They state about an hour to an hour half ago the patient began with sharp left-sided chest pain  Worse with palpation  It is associated with shortness of breath  No back pain  No abdominal pain nausea vomiting  No diaphoresis  No exacerbating or relieving factors  Denies leg pain leg edema  Patient does have chronic baseline deficits due to prior stroke  Prior to Admission Medications   Prescriptions Last Dose Informant Patient Reported? Taking?   atorvastatin (LIPITOR) 40 mg tablet   No Yes   Sig: Take 1 tablet by mouth daily with dinner for 30 days   cholecalciferol (VITAMIN D3) 1,000 units tablet   Yes Yes   Sig: Take 1,000 Units by mouth daily   escitalopram (LEXAPRO) 10 mg tablet   Yes Yes   Sig: Take 1 tablet by mouth daily   ibuprofen (MOTRIN) 600 mg tablet   No No   Sig: Take 1 tablet by mouth every 8 (eight) hours as needed for mild pain or moderate pain for up to 5 days   potassium chloride (K-DUR,KLOR-CON) 20 mEq tablet   No No   Sig: Take 1 tablet by mouth daily for 30 days   potassium chloride (K-DUR,KLOR-CON) 20 mEq tablet   Yes Yes   Sig: Take 1 tablet by mouth daily      Facility-Administered Medications: None       Past Medical History:   Diagnosis Date    CVA, old, aphasia     Medial meniscus tear     left    Mitral valve prolapse     [facility]roke [facility])        Past Surgical History:   Procedure Laterality Date     SECTION      FRACTURE SURGERY      HYSTERECTOMY      [place] OPEN RX FEMUR FX+INTRAMED ALAYNA Left 2017    Procedure: INSERTION NAIL IM FEMUR ANTEGRADE (TROCHANTERIC);   Surgeon: Edward Hopkins MD;  [facility];  Service: Orthopedics    TONSILLECTOMY         Family History Problem Relation Age of Onset    Arthritis Mother     Heart disease Father     Heart attack Father      I have reviewed and agree with the history as documented  Social History     Tobacco Use    Smoking status: Never Smoker    Smokeless tobacco: Never Used   Substance Use Topics    Alcohol use: No    Drug use: No        Review of Systems   Constitutional: Negative  HENT: Negative  Eyes: Negative  Respiratory: Positive for shortness of breath  Negative for apnea, cough, choking, chest tightness, wheezing and stridor  Cardiovascular: Positive for chest pain  Negative for palpitations and leg swelling  Gastrointestinal: Negative  Endocrine: Negative  Genitourinary: Negative  Musculoskeletal: Negative  Skin: Negative  Allergic/Immunologic: Negative  Neurological: Negative  Hematological: Negative  Psychiatric/Behavioral: Negative  All other systems reviewed and are negative  Physical Exam  Physical Exam   Constitutional: She is oriented to person, place, and time  She appears well-developed and well-nourished  Non-toxic appearance  HENT:   Head: Normocephalic  Eyes: Pupils are equal, round, and reactive to light  Neck: Normal range of motion  Neck supple  Cardiovascular: Regular rhythm and normal pulses  Pulmonary/Chest: Effort normal and breath sounds normal  No accessory muscle usage or stridor  No tachypnea  No respiratory distress  Abdominal: Soft  There is no tenderness  Musculoskeletal: Normal range of motion  Neurological: She is alert and oriented to person, place, and time  Skin: Skin is warm  Capillary refill takes less than 2 seconds  Psychiatric: She has a normal mood and affect  Vitals reviewed        Vital Signs  ED Triage Vitals [02/23/19 1201]   Temperature Pulse Respirations Blood Pressure SpO2   98 °F (36 7 °C) 72 18 126/64 99 %      Temp Source Heart Rate Source Patient Position - Orthostatic VS BP Location FiO2 (%) Temporal Monitor Sitting Left arm --      Pain Score       Worst Possible Pain           Vitals:    02/23/19 1201 02/23/19 1300   BP: 126/64 116/64   Pulse: 72 64   Patient Position - Orthostatic VS: Sitting Sitting       Visual Acuity      ED Medications  Medications   sodium chloride 0 9 % infusion (125 mL/hr Intravenous New Bag 2/23/19 1244)   ketorolac (TORADOL) injection 15 mg (has no administration in time range)   aspirin chewable tablet 324 mg (324 mg Oral Given 2/23/19 1218)       Diagnostic Studies  Results Reviewed     Procedure Component Value Units Date/Time    B-type natriuretic peptide [[id number]]  (Normal) Collected:  02/23/19 1212    Lab Status:  Final result Specimen:  Blood from Arm, Left Updated:  02/23/19 1239     NT-proBNP 73 pg/mL     Magnesium [[id number]]  (Normal) Collected:  02/23/19 1212    Lab Status:  Final result Specimen:  Blood from Arm, Left Updated:  02/23/19 1239     Magnesium 2 1 mg/dL     Troponin I [[id number]]  (Normal) Collected:  02/23/19 1212    Lab Status:  Final result Specimen:  Blood from Arm, Left Updated:  02/23/19 1234     Troponin I <0 02 ng/mL     Comprehensive metabolic panel [[id number]]  (Abnormal) Collected:  02/23/19 1212    Lab Status:  Final result Specimen:  Blood from Arm, Left Updated:  02/23/19 1232     Sodium 143 mmol/L      Potassium 4 1 mmol/L      Chloride 105 mmol/L      CO2 30 mmol/L      ANION GAP 8 mmol/L      BUN 14 mg/dL      Creatinine 0 79 mg/dL      Glucose 80 mg/dL      Calcium 9 4 mg/dL      AST 40 U/L      ALT 62 U/L      Alkaline Phosphatase 164 U/L      Total Protein 7 6 g/dL      Albumin 3 7 g/dL      Total Bilirubin 0 60 mg/dL      eGFR 80 ml/min/1 73sq m     Narrative:       National Kidney Disease Education Program recommendations are as follows:  GFR calculation is accurate only with a steady state creatinine  Chronic Kidney disease less than 60 ml/min/1 73 sq  meters  Kidney failure less than 15 ml/min/1 73 sq  meters      D-Dimer [[id number]]  (Normal) Collected:  02/23/19 1212    Lab Status:  Final result Specimen:  Blood from Arm, Left Updated:  02/23/19 1227     D-Dimer, Quant 421 ng/ml (FEU)     APTT [[id number]]  (Abnormal) Collected:  02/23/19 1212    Lab Status:  Final result Specimen:  Blood from Arm, Left Updated:  02/23/19 1226     PTT 23 seconds     Protime-INR [[id number]]  (Normal) Collected:  02/23/19 1212    Lab Status:  Final result Specimen:  Blood from Arm, Left Updated:  02/23/19 1226     Protime 12 2 seconds      INR 0 95    CBC and differential [[id number]]  (Abnormal) Collected:  02/23/19 1212    Lab Status:  Final result Specimen:  Blood from Arm, Left Updated:  02/23/19 1216     WBC 5 71 Thousand/uL      RBC 4 95 Million/uL      Hemoglobin 15 2 g/dL      Hematocrit 46 9 %      MCV 95 fL      MCH 30 7 pg      MCHC 32 4 g/dL      RDW 12 7 %      MPV 9 3 fL      Platelets 055 Thousands/uL      nRBC 0 /100 WBCs      Neutrophils Relative 47 %      Immat GRANS % 0 %      Lymphocytes Relative 44 %      Monocytes Relative 7 %      Eosinophils Relative 1 %      Basophils Relative 1 %      Neutrophils Absolute 2 75 Thousands/µL      Immature Grans Absolute 0 01 Thousand/uL      Lymphocytes Absolute 2 49 Thousands/µL      Monocytes Absolute 0 37 Thousand/µL      Eosinophils Absolute 0 06 Thousand/µL      Basophils Absolute 0 03 Thousands/µL                  XR chest 1 view portable   ED Interpretation by Carlie Hussein PA-C (02/23 1241)   No evidence of acute cardiopulmonary process                 Procedures  Procedures       Phone Contacts  ED Phone Contact    ED Course  ED Course as of Feb 23 1302   Sat Feb 23, 2019   1233 D-DIMER QUANTITATIVE: 421   1233 INR: 0 95   1241 Troponin I: <0 02   1241 Magnesium: 2 1   1256 Patient states she has no change in the pain after the administration the aspirin  We will try Toradol  Last blood pressure 812 systolic  She states she has not had a recent cardiac evaluation    Plan is to admit her under observation status and have cardiology see her tomorrow  HEART Risk Score      Most Recent Value   History  1 Filed at: 02/23/2019 1214   ECG  0 Filed at: 02/23/2019 1214   Age  1 Filed at: 02/23/2019 1214   Risk Factors  1 Filed at: 02/23/2019 1214   Troponin  0 Filed at: 02/23/2019 1214   Heart Score Risk Calculator   History  1 Filed at: 02/23/2019 1214   ECG  0 Filed at: 02/23/2019 1214   Age  1 Filed at: 02/23/2019 1214   Risk Factors  1 Filed at: 02/23/2019 1214   Troponin  0 Filed at: 02/23/2019 1214   HEART Score  3 Filed at: 02/23/2019 1214   HEART Score  3 Filed at: 02/23/2019 1214                            MDM    Disposition  Final diagnoses:   Chest pain     Time reflects when diagnosis was documented in both MDM as applicable and the Disposition within this note     Time User Action Codes Description Comment    2/23/2019  1:02 PM [place] Jeans Add [R07 9] Chest pain       ED Disposition     ED Disposition Condition Date/Time Comment    Admit Stable Sat Feb 23, 2019  1:02 PM Case was discussed with Dr Mana Cary and the patient's admission status was agreed to be Admission Status: observation status to the service of Dr Holden Whitlock   Follow-up Information    None         Patient's Medications   Discharge Prescriptions    No medications on file     No discharge procedures on file      ED Provider  Electronically Signed by           Carlie Hussein PA-C  02/23/19 0452

## 2019-02-23 NOTE — ED PROCEDURE NOTE
Procedure  ECG 12 Lead Documentation  Date/Time: 2/23/2019 12:13 PM  Performed by: Vinh Graham PA-C  Authorized by: Vinh Graham PA-C     Indications / Diagnosis:  Chest pain  ECG reviewed by me, the ED Provider: yes    Previous ECG:     Comparison to cardiac monitor: Yes    Interpretation:     Interpretation: normal    Rate:     ECG rate:  72    ECG rate assessment: normal    Rhythm:     Rhythm: sinus rhythm    Ectopy:     Ectopy: none    QRS:     QRS axis:  Normal    QRS intervals:  Normal  Conduction:     Conduction: normal    ST segments:     ST segments:  Normal  T waves:     T waves: normal                       Vinh Graham PA-C  02/23/19 1214

## 2019-02-24 PROBLEM — I69.351 SPASTIC HEMIPLEGIA OF RIGHT DOMINANT SIDE AS LATE EFFECT OF CEREBRAL INFARCTION (HCC): Chronic | Status: ACTIVE | Noted: 2019-02-24

## 2019-02-24 LAB
ANION GAP SERPL CALCULATED.3IONS-SCNC: 6 MMOL/L (ref 4–13)
BUN SERPL-MCNC: 15 MG/DL (ref 5–25)
CALCIUM SERPL-MCNC: 8.4 MG/DL (ref 8.3–10.1)
CHLORIDE SERPL-SCNC: 110 MMOL/L (ref 100–108)
CO2 SERPL-SCNC: 27 MMOL/L (ref 21–32)
CREAT SERPL-MCNC: 0.64 MG/DL (ref 0.6–1.3)
ERYTHROCYTE [DISTWIDTH] IN BLOOD BY AUTOMATED COUNT: 12.6 % (ref 11.6–15.1)
GFR SERPL CREATININE-BSD FRML MDRD: 95 ML/MIN/1.73SQ M
GLUCOSE P FAST SERPL-MCNC: 77 MG/DL (ref 65–99)
GLUCOSE SERPL-MCNC: 77 MG/DL (ref 65–140)
HCT VFR BLD AUTO: 39.3 % (ref 34.8–46.1)
HGB BLD-MCNC: 12.8 G/DL (ref 11.5–15.4)
MAGNESIUM SERPL-MCNC: 2 MG/DL (ref 1.6–2.6)
MCH RBC QN AUTO: 31 PG (ref 26.8–34.3)
MCHC RBC AUTO-ENTMCNC: 32.6 G/DL (ref 31.4–37.4)
MCV RBC AUTO: 95 FL (ref 82–98)
PHOSPHATE SERPL-MCNC: 4 MG/DL (ref 2.3–4.1)
PLATELET # BLD AUTO: 249 THOUSANDS/UL (ref 149–390)
PMV BLD AUTO: 9.9 FL (ref 8.9–12.7)
POTASSIUM SERPL-SCNC: 3.8 MMOL/L (ref 3.5–5.3)
RBC # BLD AUTO: 4.13 MILLION/UL (ref 3.81–5.12)
SODIUM SERPL-SCNC: 143 MMOL/L (ref 136–145)
WBC # BLD AUTO: 6.44 THOUSAND/UL (ref 4.31–10.16)

## 2019-02-24 PROCEDURE — 85027 COMPLETE CBC AUTOMATED: CPT | Performed by: NURSE PRACTITIONER

## 2019-02-24 PROCEDURE — 99225 PR SBSQ OBSERVATION CARE/DAY 25 MINUTES: CPT | Performed by: INTERNAL MEDICINE

## 2019-02-24 PROCEDURE — 84100 ASSAY OF PHOSPHORUS: CPT | Performed by: NURSE PRACTITIONER

## 2019-02-24 PROCEDURE — 83735 ASSAY OF MAGNESIUM: CPT | Performed by: NURSE PRACTITIONER

## 2019-02-24 PROCEDURE — 80048 BASIC METABOLIC PNL TOTAL CA: CPT | Performed by: NURSE PRACTITIONER

## 2019-02-24 RX ORDER — TRAMADOL HYDROCHLORIDE 50 MG/1
50 TABLET ORAL EVERY 8 HOURS PRN
Status: DISCONTINUED | OUTPATIENT
Start: 2019-02-24 | End: 2019-02-27 | Stop reason: HOSPADM

## 2019-02-24 RX ADMIN — VITAMIN D, TAB 1000IU (100/BT) 1000 UNITS: 25 TAB at 09:20

## 2019-02-24 RX ADMIN — ESCITALOPRAM OXALATE 10 MG: 10 TABLET ORAL at 09:20

## 2019-02-24 RX ADMIN — SODIUM CHLORIDE 125 ML/HR: 0.9 INJECTION, SOLUTION INTRAVENOUS at 08:17

## 2019-02-24 RX ADMIN — ATORVASTATIN CALCIUM 40 MG: 40 TABLET, FILM COATED ORAL at 09:20

## 2019-02-24 RX ADMIN — ENOXAPARIN SODIUM 40 MG: 40 INJECTION SUBCUTANEOUS at 09:20

## 2019-02-24 RX ADMIN — ASPIRIN 324 MG: 81 TABLET, COATED ORAL at 09:20

## 2019-02-24 RX ADMIN — TRAMADOL HYDROCHLORIDE 50 MG: 50 TABLET, COATED ORAL at 13:50

## 2019-02-24 NOTE — PLAN OF CARE
Problem: Prexisting or High Potential for Compromised Skin Integrity  Goal: Skin integrity is maintained or improved  Description  INTERVENTIONS:  - Identify patients at risk for skin breakdown  - Assess and monitor skin integrity  - Assess and monitor nutrition and hydration status  - Monitor labs (i e  albumin)  - Assess for incontinence   - Turn and reposition patient  - Assist with mobility/ambulation  - Relieve pressure over bony prominences  - Avoid friction and shearing  - Provide appropriate hygiene as needed including keeping skin clean and dry  - Evaluate need for skin moisturizer/barrier cream  - Collaborate with interdisciplinary team (i e  Nutrition, Rehabilitation, etc )   - Patient/family teaching  Outcome: Progressing     Problem: PAIN - ADULT  Goal: Verbalizes/displays adequate comfort level or baseline comfort level  Description  Interventions:  - Encourage patient to monitor pain and request assistance  - Assess pain using appropriate pain scale  - Administer analgesics based on type and severity of pain and evaluate response  - Implement non-pharmacological measures as appropriate and evaluate response  - Consider cultural and social influences on pain and pain management  - Notify physician/advanced practitioner if interventions unsuccessful or patient reports new pain  Outcome: Progressing     Problem: INFECTION - ADULT  Goal: Absence or prevention of progression during hospitalization  Description  INTERVENTIONS:  - Assess and monitor for signs and symptoms of infection  - Monitor lab/diagnostic results  - Monitor all insertion sites, i e  indwelling lines, tubes, and drains  - Monitor endotracheal (as able) and nasal secretions for changes in amount and color  - [place] appropriate cooling/warming therapies per order  - Administer medications as ordered  - Instruct and encourage patient and family to use good hand hygiene technique  - Identify and instruct in appropriate isolation precautions for identified infection/condition  Outcome: Progressing  Goal: Absence of fever/infection during neutropenic period  Description  INTERVENTIONS:  - Monitor WBC   Outcome: Progressing     Problem: SAFETY ADULT  Goal: Patient will remain free of falls  Description  INTERVENTIONS:  - Assess patient frequently for physical needs  -  Identify cognitive and physical deficits and behaviors that affect risk of falls    -  [place] fall precautions as indicated by assessment   - Educate patient/family on patient safety including physical limitations  - Instruct patient to call for assistance with activity based on assessment  - Modify environment to reduce risk of injury  - Consider OT/PT consult to assist with strengthening/mobility  Outcome: Progressing  Goal: Maintain or return to baseline ADL function  Description  INTERVENTIONS:  -  Assess patient's ability to carry out ADLs; assess patient's baseline for ADL function and identify physical deficits which impact ability to perform ADLs (bathing, care of mouth/teeth, toileting, grooming, dressing, etc )  - Assess/evaluate cause of self-care deficits   - Assess range of motion  - Assess patient's mobility; develop plan if impaired  - Assess patient's need for assistive devices and provide as appropriate  - Encourage maximum independence but intervene and supervise when necessary  ¯ Involve family in performance of ADLs  ¯ Assess for home care needs following discharge   ¯ Request OT consult to assist with ADL evaluation and planning for discharge  ¯ Provide patient education as appropriate  Outcome: Progressing  Goal: Maintain or return mobility status to optimal level  Description  INTERVENTIONS:  - Assess patient's baseline mobility status (ambulation, transfers, stairs, etc )    - Identify cognitive and physical deficits and behaviors that affect mobility  - Identify mobility aids required to assist with transfers and/or ambulation (gait belt, sit-to-stand, lift, walker, cane, etc )  - [place] fall precautions as indicated by assessment  - Record patient progress and toleration of activity level on Mobility SBAR; progress patient to next Phase/Stage  - Instruct patient to call for assistance with activity based on assessment  - Request Rehabilitation consult to assist with strengthening/weightbearing, etc   Outcome: Progressing     Problem: DISCHARGE PLANNING  Goal: Discharge to home or other facility with appropriate resources  Description  INTERVENTIONS:  - Identify barriers to discharge w/patient and caregiver  - Arrange for needed discharge resources and transportation as appropriate  - Identify discharge learning needs (meds, wound care, etc )    - Refer to Case Management Department for coordinating discharge planning if the patient needs post-hospital services based on physician/advanced practitioner order or complex needs related to functional status, cognitive ability, or social support system   Outcome: Progressing     Problem: Knowledge Deficit  Goal: Patient/family/caregiver demonstrates understanding of disease process, treatment plan, medications, and discharge instructions  Description  Complete learning assessment and assess knowledge base    Interventions:  - Provide teaching at level of understanding  - Provide teaching via preferred learning methods  Outcome: Progressing     Problem: DISCHARGE PLANNING - CARE MANAGEMENT  Goal: Discharge to post-acute care or home with appropriate resources  Description  INTERVENTIONS:  - Conduct assessment to determine patient/family and health care team treatment goals, and need for post-acute services based on payer coverage, community resources, and patient preferences, and barriers to discharge  - Address psychosocial, clinical, and financial barriers to discharge as identified in assessment in conjunction with the patient/family and health care team  - Arrange appropriate level of post-acute services according to patient's   needs and preference and payer coverage in collaboration with the physician and health care team  - Communicate with and update the patient/family, physician, and health care team regarding progress on the discharge plan  - Arrange appropriate transportation to post-acute venues  -home on dc with family support and Outpatient follow up   Outcome: Progressing     Problem: Potential for Falls  Goal: Patient will remain free of falls  Description  INTERVENTIONS:  - Assess patient frequently for physical needs  -  Identify cognitive and physical deficits and behaviors that affect risk of falls    -  [place] fall precautions as indicated by assessment   - Educate patient/family on patient safety including physical limitations  - Instruct patient to call for assistance with activity based on assessment  - Modify environment to reduce risk of injury  - Consider OT/PT consult to assist with strengthening/mobility  Outcome: Progressing

## 2019-02-24 NOTE — UTILIZATION REVIEW
Initial Clinical Review    Admission: Date/Time/Statement:  OBS 2/23 @ 1302    Orders Placed This Encounter   Procedures    Place in Observation     Standing Status:   Standing     Number of Occurrences:   1     Order Specific Question:   Admitting Physician     Answer:   Nancy New [[id number]]     Order Specific Question:   Level of Care     Answer:   Med Surg [16]     ED: Date/Time/Mode of Arrival:   ED Arrival Information     Expected Arrival Acuity Means of Arrival Escorted By Service Admission Type    - 2/23/2019 11:54 Emergent Walk-In Family Member General Medicine Emergency    Arrival Complaint    chest pain        Chief Complaint:   Chief Complaint   Patient presents with    Chest Pain     Chest pain left anterior  started 1 hour ago  Pain goes into back  Also sob  History of Illness: Patient presents to the emergency department today with her mother and her son  Between the 3 them the all provide the history  They state about an hour to an hour half ago the patient began with sharp left-sided chest pain  Worse with palpation  It is associated with shortness of breath  No back pain  No abdominal pain nausea vomiting  No diaphoresis  No exacerbating or relieving factors  Denies leg pain leg edema  ED Vital Signs:   ED Triage Vitals [02/23/19 1201]   Temperature Pulse Respirations Blood Pressure SpO2   98 °F (36 7 °C) 72 18 126/64 99 %      Temp Source Heart Rate Source Patient Position - Orthostatic VS BP Location FiO2 (%)   Temporal Monitor Sitting Left arm --      Pain Score       Worst Possible Pain        Wt Readings from Last 1 Encounters:   02/23/19 72 kg (158 lb 11 7 oz)     Pertinent Labs/Diagnostic Test Results:   Trops  < 0 02,   < 0 02  Na 143,  K 4 1,   Cl 105,   BUN 14,   Cr 0 79,  Glu 80,  Alk phos 164  WBC's 5 71,   H&H 15 2 / 46 9   D-Dimer  neg  CXR: No acute cardiopulmonary disease    EKG: NSR    ED Treatment:   Medication Administration from 02/23/2019 1154 to 02/23/2019 1603       Date/Time Order Dose Route Action Action by Comments     02/23/2019 1244 sodium chloride 0 9 % infusion 125 mL/hr Intravenous New Bag       02/23/2019 1218 aspirin chewable tablet 324 mg 324 mg Oral Given       02/23/2019 1305 ketorolac (TORADOL) injection 15 mg 15 mg Intravenous Given          Past Medical/Surgical History:     Past Medical History:   Diagnosis Date    CVA, old, aphasia     Hypertension     Medial meniscus tear     Mitral valve prolapse     Stroke [facility])      Admitting Diagnosis: Chest pain [R07 9]  Age/Sex: 61 y o  female     Assessment/Plan:   62 yo Female presented with Left mid sternal Chest pain  EKG NSR, initial trop negative  Monitor on Tele,  Trend trops & EKG,  ECHO,  Nuclear stress Test  Patient does have chronic baseline deficits due to prior stroke including right -sided weakness +1 Upper and Lower EXT and Expressive Aphasia       Admission Orders:  OBS  TELE  Scheduled Meds:   Current Facility-Administered Medications:  acetaminophen 650 mg Oral Q6H PRN    albuterol 2 5 mg Nebulization Q6H PRN    aspirin 324 mg Oral Daily    atorvastatin 40 mg Oral Daily    cholecalciferol 1,000 Units Oral Daily    enoxaparin 40 mg Subcutaneous Daily    escitalopram 10 mg Oral Daily    morphine injection 2 mg Intravenous Q3H PRN    nitroglycerin 0 4 mg Sublingual Q5 Min PRN      IVF @ 125 / hr  -  DC'd 2/24  Serial trops  EKG with 2nd & 3rd trops  EKG with CP or ST changes  ECHO  Nuclear Perfusion Scan  SCD's  CBC, BMP, Mag, Phos in am    2/24:  98 - 66 - 18   116/61   RA 95%  Cl 110      Network Utilization Review Department  Phone: [phone]; Fax [phone]  [email or web address]  ATTENTION: Please call with any questions or concerns to [phone]  and carefully listen to the prompts so that you are directed to the right person     Send all requests for admission clinical reviews, approved or denied determinations and any other requests to fax [phone]   All voicemails are confidential

## 2019-02-24 NOTE — PROGRESS NOTES
[facility] Internal Medicine Progress Note  Patient: Real Walton 61 y o  female   MRN: [id number]  PCP: Low Jacobo DO  Unit/Bed#: 137-11 Encounter: [id number]  Date Of Visit: 19    Assessment:    Principal Problem:    Chest pain at rest  Active Problems:    Expressive aphasia    Hypertension    Spastic hemiplegia of right dominant side as late effect of cerebral infarction [facility])      Plan:    · Continue telemetry  · TTE and pharmacologic nuclear stress test tomorrow  · Continue daily aspirin at full-dose      VTE Pharmacologic Prophylaxis:   Pharmacologic: Enoxaparin (Lovenox)  Mechanical VTE Prophylaxis in Place: Yes    Patient Centered Rounds: I have performed bedside rounds with nursing staff today  Current Length of Stay: 0 day(s)    Current Patient Status: Observation     Code Status: Level 1 - Full Code      Subjective:   Patient seen and examined this morning  Mother in attendance at bedside  Patient has been chest pain-free since arrival to the telemetry unit  Has no other complaints  Objective:     Vitals:   Temp (24hrs), Av 9 °F (36 6 °C), Min:97 2 °F (36 2 °C), Max:98 4 °F (36 9 °C)    Temp:  [97 2 °F (36 2 °C)-98 4 °F (36 9 °C)] 98 4 °F (36 9 °C)  HR:  [62-74] 74  Resp:  [16-18] 16  BP: ()/(55-61) 94/55  SpO2:  [95 %-97 %] 95 %  Body mass index is 27 25 kg/m²  Input and Output Summary (last 24 hours):        Intake/Output Summary (Last 24 hours) at 2019 1827  Last data filed at 2019 1700  Gross per 24 hour   Intake 3111 25 ml   Output 2970 ml   Net 141 25 ml       Physical Exam:   General: in no overt distress  HEENT: not pale, not jaundiced  Chest: clear lungs, no wheeze  Heart: S1 S2 audible, regular  Abd: soft, nontender, bowel sounds present  Psych: appropriately oriented in all spheres  Neuro: Awake, alert, old right hemiplegia and dysarthria from prior stroke, difficulty with vocalization but gestures appropriate responses as well as "no" & "yes"      Additional Data:     Labs:    Results from last 7 days   Lab Units 02/24/19  0448  02/23/19  1212   WBC Thousand/uL 6 44  --  5 71   HEMOGLOBIN g/dL 12 8  --  15 2   HEMATOCRIT % 39 3  --  46 9*   PLATELETS Thousands/uL 249   < > 301   NEUTROS PCT %  --   --  47   LYMPHS PCT %  --   --  44   MONOS PCT %  --   --  7   EOS PCT %  --   --  1    < > = values in this interval not displayed  Results from last 7 days   Lab Units 02/24/19  0448 02/23/19  1212   POTASSIUM mmol/L 3 8 4 1   CHLORIDE mmol/L 110* 105   CO2 mmol/L 27 30   BUN mg/dL 15 14   CREATININE mg/dL 0 64 0 79   CALCIUM mg/dL 8 4 9 4   ALK PHOS U/L  --  164*   ALT U/L  --  62   AST U/L  --  40     Results from last 7 days   Lab Units 02/23/19  1212   INR  0 95     Invalid input(s): JAMES    * I Have Reviewed All Lab Data Listed Above  * Additional Pertinent Lab Tests Reviewed: [facility] Admission Reviewed    Imaging:    Imaging Reports Reviewed Today Include: CXR      Recent Cultures (last 7 days):           Last 24 Hours Medication List:     Current Facility-Administered Medications:  acetaminophen 650 mg Oral Q6H PRN June Bell MD   albuterol 2 5 mg Nebulization Q6H PRN June Bell MD   aspirin 324 mg Oral Daily June Bell MD   atorvastatin 40 mg Oral Daily Gerri Y Swank, CRNP   cholecalciferol 1,000 Units Oral Daily Gerri Y Swank, CRNP   enoxaparin 40 mg Subcutaneous Daily Gerri Y Swank, CRNP   escitalopram 10 mg Oral Daily Gerri Y Swank, CRNP   nitroglycerin 0 4 mg Sublingual Q5 Min PRN June Bell MD   traMADol 50 mg Oral Q8H PRN June Bell MD        Today, Patient Was Seen By: June Bell MD    ** Please Note: Dragon 360 Dictation voice to text software may have been used in the creation of this document   **

## 2019-02-24 NOTE — SOCIAL WORK
Cm spoke with the patients mother:Mariaelena Andrews as pt has expressive aphasia to evaluate the patients prior function and living situation and any barriers to d/c and form a safe d/c plan  Cm also evaluated the patient for any services in the home or needs for services  Pt resides at home with her son:Nehemias who is her primary caregiver  Resides in a house:ramp to enter then a stairglide inside  Pt's requires assistance with adls due to prior hx of a stroke  Re:ambulation pt stands/pivots with assistance of her son  Primarily they use the wc for pt  Pt's family transports her to Dr's appointments(son Yolanda Melvin does the physical part to get pt into the car and pt's mother:Mariaelena does the driving)  PCP is Shanique Magdaleno and pharmacy is [facility] or mail order  Plans at this time are home on dc  Cm will continue to follow and assist in dc planning

## 2019-02-25 ENCOUNTER — APPOINTMENT (OUTPATIENT)
Dept: NON INVASIVE DIAGNOSTICS | Facility: HOSPITAL | Age: 64
DRG: 313 | End: 2019-02-25
Payer: COMMERCIAL

## 2019-02-25 ENCOUNTER — APPOINTMENT (OUTPATIENT)
Dept: CT IMAGING | Facility: HOSPITAL | Age: 64
DRG: 313 | End: 2019-02-25
Payer: COMMERCIAL

## 2019-02-25 PROBLEM — I95.9 HYPOTENSION: Status: ACTIVE | Noted: 2019-02-25

## 2019-02-25 PROBLEM — R10.13 EPIGASTRIC PAIN: Status: ACTIVE | Noted: 2019-02-25

## 2019-02-25 LAB
ALBUMIN SERPL BCP-MCNC: 3 G/DL (ref 3.5–5)
ALP SERPL-CCNC: 136 U/L (ref 46–116)
ALT SERPL W P-5'-P-CCNC: 40 U/L (ref 12–78)
ANION GAP SERPL CALCULATED.3IONS-SCNC: 8 MMOL/L (ref 4–13)
AST SERPL W P-5'-P-CCNC: 22 U/L (ref 5–45)
BASOPHILS # BLD AUTO: 0.05 THOUSANDS/ΜL (ref 0–0.1)
BASOPHILS NFR BLD AUTO: 1 % (ref 0–1)
BILIRUB SERPL-MCNC: 0.3 MG/DL (ref 0.2–1)
BUN SERPL-MCNC: 20 MG/DL (ref 5–25)
CALCIUM SERPL-MCNC: 8.7 MG/DL (ref 8.3–10.1)
CHLORIDE SERPL-SCNC: 107 MMOL/L (ref 100–108)
CK SERPL-CCNC: 99 U/L (ref 26–192)
CO2 SERPL-SCNC: 29 MMOL/L (ref 21–32)
CREAT SERPL-MCNC: 0.79 MG/DL (ref 0.6–1.3)
EOSINOPHIL # BLD AUTO: 0.1 THOUSAND/ΜL (ref 0–0.61)
EOSINOPHIL NFR BLD AUTO: 2 % (ref 0–6)
ERYTHROCYTE [DISTWIDTH] IN BLOOD BY AUTOMATED COUNT: 12.9 % (ref 11.6–15.1)
GFR SERPL CREATININE-BSD FRML MDRD: 80 ML/MIN/1.73SQ M
GLUCOSE SERPL-MCNC: 76 MG/DL (ref 65–140)
HCT VFR BLD AUTO: 40.7 % (ref 34.8–46.1)
HGB BLD-MCNC: 13.1 G/DL (ref 11.5–15.4)
IMM GRANULOCYTES # BLD AUTO: 0.01 THOUSAND/UL (ref 0–0.2)
IMM GRANULOCYTES NFR BLD AUTO: 0 % (ref 0–2)
INR PPP: 1 (ref 0.86–1.17)
LACTATE SERPL-SCNC: 0.8 MMOL/L (ref 0.5–2)
LYMPHOCYTES # BLD AUTO: 3.24 THOUSANDS/ΜL (ref 0.6–4.47)
LYMPHOCYTES NFR BLD AUTO: 47 % (ref 14–44)
MCH RBC QN AUTO: 31 PG (ref 26.8–34.3)
MCHC RBC AUTO-ENTMCNC: 32.2 G/DL (ref 31.4–37.4)
MCV RBC AUTO: 96 FL (ref 82–98)
MONOCYTES # BLD AUTO: 0.6 THOUSAND/ΜL (ref 0.17–1.22)
MONOCYTES NFR BLD AUTO: 9 % (ref 4–12)
NEUTROPHILS # BLD AUTO: 2.74 THOUSANDS/ΜL (ref 1.85–7.62)
NEUTS SEG NFR BLD AUTO: 41 % (ref 43–75)
NRBC BLD AUTO-RTO: 0 /100 WBCS
PLATELET # BLD AUTO: 250 THOUSANDS/UL (ref 149–390)
PMV BLD AUTO: 10 FL (ref 8.9–12.7)
POTASSIUM SERPL-SCNC: 4 MMOL/L (ref 3.5–5.3)
PROT SERPL-MCNC: 6.1 G/DL (ref 6.4–8.2)
PROTHROMBIN TIME: 12.7 SECONDS (ref 11.8–14.2)
RBC # BLD AUTO: 4.23 MILLION/UL (ref 3.81–5.12)
SODIUM SERPL-SCNC: 144 MMOL/L (ref 136–145)
TROPONIN I SERPL-MCNC: <0.02 NG/ML
WBC # BLD AUTO: 6.74 THOUSAND/UL (ref 4.31–10.16)

## 2019-02-25 PROCEDURE — 93306 TTE W/DOPPLER COMPLETE: CPT

## 2019-02-25 PROCEDURE — 82550 ASSAY OF CK (CPK): CPT | Performed by: INTERNAL MEDICINE

## 2019-02-25 PROCEDURE — 84484 ASSAY OF TROPONIN QUANT: CPT | Performed by: INTERNAL MEDICINE

## 2019-02-25 PROCEDURE — 99244 OFF/OP CNSLTJ NEW/EST MOD 40: CPT | Performed by: INTERNAL MEDICINE

## 2019-02-25 PROCEDURE — 85025 COMPLETE CBC W/AUTO DIFF WBC: CPT | Performed by: INTERNAL MEDICINE

## 2019-02-25 PROCEDURE — 83605 ASSAY OF LACTIC ACID: CPT | Performed by: INTERNAL MEDICINE

## 2019-02-25 PROCEDURE — 99225 PR SBSQ OBSERVATION CARE/DAY 25 MINUTES: CPT | Performed by: INTERNAL MEDICINE

## 2019-02-25 PROCEDURE — 80053 COMPREHEN METABOLIC PANEL: CPT | Performed by: INTERNAL MEDICINE

## 2019-02-25 PROCEDURE — 93306 TTE W/DOPPLER COMPLETE: CPT | Performed by: INTERNAL MEDICINE

## 2019-02-25 PROCEDURE — 74177 CT ABD & PELVIS W/CONTRAST: CPT

## 2019-02-25 PROCEDURE — 93005 ELECTROCARDIOGRAM TRACING: CPT

## 2019-02-25 PROCEDURE — 87040 BLOOD CULTURE FOR BACTERIA: CPT | Performed by: INTERNAL MEDICINE

## 2019-02-25 PROCEDURE — 85610 PROTHROMBIN TIME: CPT | Performed by: INTERNAL MEDICINE

## 2019-02-25 PROCEDURE — C9113 INJ PANTOPRAZOLE SODIUM, VIA: HCPCS | Performed by: INTERNAL MEDICINE

## 2019-02-25 RX ORDER — PANTOPRAZOLE SODIUM 40 MG/1
40 INJECTION, POWDER, FOR SOLUTION INTRAVENOUS ONCE
Status: COMPLETED | OUTPATIENT
Start: 2019-02-25 | End: 2019-02-25

## 2019-02-25 RX ORDER — SODIUM CHLORIDE, SODIUM LACTATE, POTASSIUM CHLORIDE, CALCIUM CHLORIDE 600; 310; 30; 20 MG/100ML; MG/100ML; MG/100ML; MG/100ML
100 INJECTION, SOLUTION INTRAVENOUS CONTINUOUS
Status: DISPENSED | OUTPATIENT
Start: 2019-02-25 | End: 2019-02-26

## 2019-02-25 RX ORDER — PANTOPRAZOLE SODIUM 40 MG/1
40 TABLET, DELAYED RELEASE ORAL
Status: DISCONTINUED | OUTPATIENT
Start: 2019-02-26 | End: 2019-02-27 | Stop reason: HOSPADM

## 2019-02-25 RX ADMIN — ATORVASTATIN CALCIUM 40 MG: 40 TABLET, FILM COATED ORAL at 08:18

## 2019-02-25 RX ADMIN — PANTOPRAZOLE SODIUM 40 MG: 40 INJECTION, POWDER, FOR SOLUTION INTRAVENOUS at 22:13

## 2019-02-25 RX ADMIN — IOHEXOL 100 ML: 350 INJECTION, SOLUTION INTRAVENOUS at 18:26

## 2019-02-25 RX ADMIN — ASPIRIN 324 MG: 81 TABLET, COATED ORAL at 08:18

## 2019-02-25 RX ADMIN — SODIUM CHLORIDE, SODIUM LACTATE, POTASSIUM CHLORIDE, AND CALCIUM CHLORIDE 100 ML/HR: .6; .31; .03; .02 INJECTION, SOLUTION INTRAVENOUS at 22:12

## 2019-02-25 RX ADMIN — IOHEXOL 50 ML: 240 INJECTION, SOLUTION INTRATHECAL; INTRAVASCULAR; INTRAVENOUS; ORAL at 18:26

## 2019-02-25 RX ADMIN — VITAMIN D, TAB 1000IU (100/BT) 1000 UNITS: 25 TAB at 08:18

## 2019-02-25 RX ADMIN — ESCITALOPRAM OXALATE 10 MG: 10 TABLET ORAL at 08:18

## 2019-02-25 RX ADMIN — SODIUM CHLORIDE 500 ML: 0.9 INJECTION, SOLUTION INTRAVENOUS at 10:19

## 2019-02-25 RX ADMIN — ENOXAPARIN SODIUM 40 MG: 40 INJECTION SUBCUTANEOUS at 08:18

## 2019-02-25 NOTE — UTILIZATION REVIEW
Initial Clinical Review    CONTINUATION OF OBSERVATION CARE      Admission: Date/Time/Statement:  OBS 2/23 @ 1302           Orders Placed This Encounter   Procedures    Place in Observation       Standing Status:   Standing       Number of Occurrences:   1       Order Specific Question:   Admitting Physician       Answer:   Tim Bonilla [[id number]]       Order Specific Question:   Level of Care       Answer:   Med Surg [16]      ED: Date/Time/Mode of Arrival:             ED Arrival Information      Expected Arrival Acuity Means of Arrival Escorted By Service Admission Type     - 2/23/2019 11:54 Emergent Walk-In Family Member General Medicine Emergency     Arrival Complaint     chest pain          Chief Complaint:        Chief Complaint   Patient presents with    Chest Pain       Chest pain left anterior  started 1 hour ago  Pain goes into back  Also sob       History of Illness: Patient presents to the emergency department today with her mother and her son  Dolly Nettles the 3 them the all provide the history  Thomas Stanley state about an hour to an hour half ago the patient began with sharp left-sided chest pain   Worse with palpation   It is associated with shortness of breath       ED Vital Signs:            ED Triage Vitals [02/23/19 1201]   Temperature Pulse Respirations Blood Pressure SpO2   98 °F (36 7 °C) 72 18 126/64 99 %       Temp Source Heart Rate Source Patient Position - Orthostatic VS BP Location FiO2 (%)   Temporal Monitor Sitting Left arm --       Pain Score           Worst Possible Pain                Wt Readings from Last 1 Encounters:   02/23/19 72 kg (158 lb 11 7 oz)      Pertinent Labs/Diagnostic Test Results:   Trops  < 0 02,   < 0 02  Na 143,  K 4 1,   Cl 105,   BUN 14,   Cr 0 79,  Glu 80,  Alk phos 164  WBC's 5 71,   H&H 15 2 / 46 9   D-Dimer  neg  CXR: No acute cardiopulmonary disease    EKG: NSR     ED Treatment:               Date/Time Order Dose Route Action     02/23/2019 1244 sodium chloride 0 9 % infusion 125 mL/hr Intravenous New Bag     02/23/2019 1218 aspirin chewable tablet 324 mg 324 mg Oral Given     02/23/2019 1305 ketorolac (TORADOL) injection 15 mg 15 mg Intravenous Given      Past Medical/Surgical History:           Past Medical History:   Diagnosis Date    CVA, old, aphasia      Hypertension      Medial meniscus tear      Mitral valve prolapse      [facility]roke [facility])        Admitting Diagnosis: Chest pain [R07 9]  Age/Sex: 61 y o  female      Assessment/Plan:   62 yo Female presented with Left mid sternal Chest pain  EKG NSR, initial trop negative  Monitor on Tele,  Trend trops & EKG,  ECHO,  Nuclear stress Test  Patient does have chronic baseline deficits due to prior stroke including right -sided weakness +1 Upper and Lower EXT and Expressive Aphasia         Admission Orders:  OBS  TELE  Scheduled Meds:   Current Facility-Administered Medications:  acetaminophen 650 mg Oral Q6H PRN   albuterol 2 5 mg Nebulization Q6H PRN   aspirin 324 mg Oral Daily   atorvastatin 40 mg Oral Daily   cholecalciferol 1,000 Units Oral Daily   enoxaparin 40 mg Subcutaneous Daily   escitalopram 10 mg Oral Daily   morphine injection 2 mg Intravenous Q3H PRN   nitroglycerin 0 4 mg Sublingual Q5 Min PRN      IVF @ 125 / hr  -  DC'd 2/24  Serial trops  EKG with 2nd & 3rd trops  EKG with CP or ST changes  ECHO  Nuclear Perfusion Scan  SCD's  CBC, BMP, Mag, Phos in am     2/24:  98 - 66 - 18   116/61   RA 95%  Cl 110      CARDIOLOGY CONSULT 2/25:  Agree with proceeding with a Lexiscan nuclear perfusion study  Currently hypotensive --- give IVF   BP's 77/39-96/52    Nuclear scan held d/t hypotension   Plan to do 2/26      Network Utilization Review Department  Phone: [phone]; Fax [phone]  [email or web address]  ATTENTION: Please call with any questions or concerns to [phone]  and carefully listen to the prompts so that you are directed to the right person     Send all requests for admission clinical reviews, approved or denied determinations and any other requests to fax [phone]   All voicemails are confidential

## 2019-02-25 NOTE — PLAN OF CARE
Problem: Prexisting or High Potential for Compromised Skin Integrity  Goal: Skin integrity is maintained or improved  Description  INTERVENTIONS:  - Identify patients at risk for skin breakdown  - Assess and monitor skin integrity  - Assess and monitor nutrition and hydration status  - Monitor labs (i e  albumin)  - Assess for incontinence   - Turn and reposition patient  - Assist with mobility/ambulation  - Relieve pressure over bony prominences  - Avoid friction and shearing  - Provide appropriate hygiene as needed including keeping skin clean and dry  - Evaluate need for skin moisturizer/barrier cream  - Collaborate with interdisciplinary team (i e  Nutrition, Rehabilitation, etc )   - Patient/family teaching  Outcome: Progressing     Problem: PAIN - ADULT  Goal: Verbalizes/displays adequate comfort level or baseline comfort level  Description  Interventions:  - Encourage patient to monitor pain and request assistance  - Assess pain using appropriate pain scale  - Administer analgesics based on type and severity of pain and evaluate response  - Implement non-pharmacological measures as appropriate and evaluate response  - Consider cultural and social influences on pain and pain management  - Notify physician/advanced practitioner if interventions unsuccessful or patient reports new pain  Outcome: Progressing     Problem: INFECTION - ADULT  Goal: Absence or prevention of progression during hospitalization  Description  INTERVENTIONS:  - Assess and monitor for signs and symptoms of infection  - Monitor lab/diagnostic results  - Monitor all insertion sites, i e  indwelling lines, tubes, and drains  - Monitor endotracheal (as able) and nasal secretions for changes in amount and color  - Leonard appropriate cooling/warming therapies per order  - Administer medications as ordered  - Instruct and encourage patient and family to use good hand hygiene technique  - Identify and instruct in appropriate isolation precautions for identified infection/condition  Outcome: Progressing  Goal: Absence of fever/infection during neutropenic period  Description  INTERVENTIONS:  - Monitor WBC   Outcome: Progressing     Problem: SAFETY ADULT  Goal: Patient will remain free of falls  Description  INTERVENTIONS:  - Assess patient frequently for physical needs  -  Identify cognitive and physical deficits and behaviors that affect risk of falls    -  [place] fall precautions as indicated by assessment   - Educate patient/family on patient safety including physical limitations  - Instruct patient to call for assistance with activity based on assessment  - Modify environment to reduce risk of injury  - Consider OT/PT consult to assist with strengthening/mobility  Outcome: Progressing  Goal: Maintain or return to baseline ADL function  Description  INTERVENTIONS:  -  Assess patient's ability to carry out ADLs; assess patient's baseline for ADL function and identify physical deficits which impact ability to perform ADLs (bathing, care of mouth/teeth, toileting, grooming, dressing, etc )  - Assess/evaluate cause of self-care deficits   - Assess range of motion  - Assess patient's mobility; develop plan if impaired  - Assess patient's need for assistive devices and provide as appropriate  - Encourage maximum independence but intervene and supervise when necessary  ¯ Involve family in performance of ADLs  ¯ Assess for home care needs following discharge   ¯ Request OT consult to assist with ADL evaluation and planning for discharge  ¯ Provide patient education as appropriate  Outcome: Progressing  Goal: Maintain or return mobility status to optimal level  Description  INTERVENTIONS:  - Assess patient's baseline mobility status (ambulation, transfers, stairs, etc )    - Identify cognitive and physical deficits and behaviors that affect mobility  - Identify mobility aids required to assist with transfers and/or ambulation (gait belt, sit-to-stand, lift, walker, cane, etc )  - [place] fall precautions as indicated by assessment  - Record patient progress and toleration of activity level on Mobility SBAR; progress patient to next Phase/Stage  - Instruct patient to call for assistance with activity based on assessment  - Request Rehabilitation consult to assist with strengthening/weightbearing, etc   Outcome: Progressing     Problem: DISCHARGE PLANNING  Goal: Discharge to home or other facility with appropriate resources  Description  INTERVENTIONS:  - Identify barriers to discharge w/patient and caregiver  - Arrange for needed discharge resources and transportation as appropriate  - Identify discharge learning needs (meds, wound care, etc )    - Refer to Case Management Department for coordinating discharge planning if the patient needs post-hospital services based on physician/advanced practitioner order or complex needs related to functional status, cognitive ability, or social support system   Outcome: Progressing     Problem: Knowledge Deficit  Goal: Patient/family/caregiver demonstrates understanding of disease process, treatment plan, medications, and discharge instructions  Description  Complete learning assessment and assess knowledge base    Interventions:  - Provide teaching at level of understanding  - Provide teaching via preferred learning methods  Outcome: Progressing     Problem: DISCHARGE PLANNING - CARE MANAGEMENT  Goal: Discharge to post-acute care or home with appropriate resources  Description  INTERVENTIONS:  - Conduct assessment to determine patient/family and health care team treatment goals, and need for post-acute services based on payer coverage, community resources, and patient preferences, and barriers to discharge  - Address psychosocial, clinical, and financial barriers to discharge as identified in assessment in conjunction with the patient/family and health care team  - Arrange appropriate level of post-acute services according to patient's   needs and preference and payer coverage in collaboration with the physician and health care team  - Communicate with and update the patient/family, physician, and health care team regarding progress on the discharge plan  - Arrange appropriate transportation to post-acute venues  -home on dc with family support and Outpatient follow up   Outcome: Progressing     Problem: Potential for Falls  Goal: Patient will remain free of falls  Description  INTERVENTIONS:  - Assess patient frequently for physical needs  -  Identify cognitive and physical deficits and behaviors that affect risk of falls    -  [place] fall precautions as indicated by assessment   - Educate patient/family on patient safety including physical limitations  - Instruct patient to call for assistance with activity based on assessment  - Modify environment to reduce risk of injury  - Consider OT/PT consult to assist with strengthening/mobility  Outcome: Progressing

## 2019-02-25 NOTE — CONSULTS
Consultation - Cardiology   Shayal Bryan 61 y o  female MRN: [id number]  Unit/Bed#: 352-77 Encounter: [id number]    Consults      Physician Requesting Consult: Stephanie Vásquez MD  Reason for Consult / Principal Problem: chest pain     Assessment:  1  Atypical chest pain  2  History of CVA  3  PFO  4  Hyperlipidemia     Plan:  Chest pain unlikely to be cardiac in nature as it is reproducible on exam   However, the patient is unable to further characterize chest pain due to expressive aphasia  She does have risk factors for CAD including CVA and hyperlipidemia  - no telemetry events to suggest ischemia   - troponin negative   - will check a nuclear stress test   - if EF abnormal on nuclear will order an echo   - further recommendations will be made based on results of above    History of Present Illness   HPI: Shayla Bryan is a 61y o  year old female who has a history of CVA may 2016, PFO, HTN, and hyperlipidemia who does not follow with any primary cardiologist     Following history has been mainly prior provider notes as patient has expressive aphasia and is unable to communicate her history from answering simple yes and no questions  Patient presented to the emergency department February 23rd with her family I due to a complaint chest pain  According to the ED provider the family states that the patient of substernal chest pain  It was tender to touch  It was apparently associated with shortness of breath  In the emergency department she was given aspirin which did not relieve her chest pain  She had a chest x-ray that revealed no acute cardiopulmonary process  She was admitted for further observation and work up  Patient denies any personal history of heart attack, heart failure, and abnormal heart rhythms  She had a right-sided CVA in May 2016 that left her with right-sided paralysis/paresis as well as expressive aphasia    She lives at home with her son who is her caretaker  ROS  Negative   + for chest pain  Historical Information   Past Medical History:   Diagnosis Date    CVA, old, aphasia     Hypertension     Medial meniscus tear     left    Mitral valve prolapse     Spastic hemiplegia of right dominant side as late effect of cerebral infarction ([facility] [address] ) 2019    Stroke [facility])      Past Surgical History:   Procedure Laterality Date     SECTION      FRACTURE SURGERY      HYSTERECTOMY      GA OPEN RX FEMUR FX+INTRAMED ALAYNA Left 2017    Procedure: INSERTION NAIL IM FEMUR ANTEGRADE (TROCHANTERIC); Surgeon: Patricia Canas MD;  Location: [facility];  Service: Orthopedics    TONSILLECTOMY       Social History     Substance and Sexual Activity   Alcohol Use Yes    Frequency: Monthly or less     Social History     Substance and Sexual Activity   Drug Use No     Social History     Tobacco Use   Smoking Status Never Smoker   Smokeless Tobacco Never Used     Family History: non-contributory    Meds/Allergies   all current active meds have been reviewed       Allergies   Allergen Reactions    Other Other (See Comments)       Objective   Vitals: Blood pressure 92/50, pulse 62, temperature 97 5 °F (36 4 °C), temperature source Temporal, resp  rate 18, height 5' 4" (1 626 m), weight 72 kg (158 lb 11 7 oz), SpO2 96 %  , Body mass index is 27 25 kg/m² ,   Orthostatic Blood Pressures      Most Recent Value   Blood Pressure  92/50 [Simultaneous filing  User may not have seen previous data ] filed at 2019 0905   Patient Position - Orthostatic VS  Sitting [Simultaneous filing   User may not have seen previous data ] filed at 2019 5216        Systolic (93SVG), FVS:98 , Min:77 , VB     Diastolic (07UCT), WIO:72, Min:39, Max:58      Intake/Output Summary (Last 24 hours) at 2019 1045  Last data filed at 2019 0835  Gross per 24 hour   Intake 1260 ml   Output 1450 ml   Net -190 ml       Weight (last 2 days)     Date/Time   Weight    19 1613   72 (158 73)    02/23/19 1201   71 8 (158 29)              Invasive Devices     Peripheral Intravenous Line            Peripheral IV 02/23/19 Left Hand 1 day                  Physical Exam  Constitutional: Awake, alert, in no acute distress    HEENT: normocephalic, atraumatic, PERRL    Neck: supple, ROM normal, no carotid bruits auscultatied    Heart: RRR, normal S1, S2, no murmurs, intact distal pulses    Lungs: CTA, no wheezing, rhonchi, or rales    Abd: BS x 4, tenderness to palpation in the epigastric region, tenderness to palpation in lower abdomen due to lovenox injections     Extremities: no lower extremity edema bilaterally                Laboratory Results:  Results from last 7 days   Lab Units 02/23/19 2018 02/23/19  1212   TROPONIN I ng/mL <0 02 <0 02       CBC with diff:   Results from last 7 days   Lab Units 02/24/19 0448 02/23/19 2017 02/23/19  1212   WBC Thousand/uL 6 44  --  5 71   HEMOGLOBIN g/dL 12 8  --  15 2   HEMATOCRIT % 39 3  --  46 9*   MCV fL 95  --  95   PLATELETS Thousands/uL 249 267 301   MCH pg 31 0  --  30 7   MCHC g/dL 32 6  --  32 4   RDW % 12 6  --  12 7   MPV fL 9 9 9 6 9 3   NRBC AUTO /100 WBCs  --   --  0         CMP:  Results from last 7 days   Lab Units 02/24/19 0448 02/23/19  1212   POTASSIUM mmol/L 3 8 4 1   CHLORIDE mmol/L 110* 105   CO2 mmol/L 27 30   BUN mg/dL 15 14   CREATININE mg/dL 0 64 0 79   CALCIUM mg/dL 8 4 9 4   AST U/L  --  40   ALT U/L  --  62   ALK PHOS U/L  --  164*   EGFR ml/min/1 73sq m 95 80         BMP:  Results from last 7 days   Lab Units 02/24/19 0448 02/23/19  1212   POTASSIUM mmol/L 3 8 4 1   CHLORIDE mmol/L 110* 105   CO2 mmol/L 27 30   BUN mg/dL 15 14   CREATININE mg/dL 0 64 0 79   CALCIUM mg/dL 8 4 9 4       BNP:  No results for input(s): BNP in the last 72 hours      Magnesium:   Results from last 7 days   Lab Units 02/24/19 0448 02/23/19  1212   MAGNESIUM mg/dL 2 0 2 1       Coags:   Results from last 7 days   Lab Units 02/23/19  1212 PTT seconds 23*   INR  0 95       TSH:       Hemoglobin A1C       Lipid Profile:         Cardiac testing:   Results for orders placed during the hospital encounter of 16   Echo complete with contrast if indicated    Narrative [address]  [facility], [address] [facility]  [phone]    Transthoracic Echocardiogram  2D, M-mode, Doppler, and Color Doppler    Study date:  22-May-2016    Patient: Billy Pereira  MR number: [id number]  Account number: [de-identified]  : 1955  Age: 61 years  Gender: Female  Status: Inpatient  Location: Bedside  Height: 69 in  Weight: 213 6 lb  BP: 129/ 69 mmHg    Indications: TIA    Diagnoses: G45 9 - Transient cerebral ischemic attack, unspecified    Sonographer:  Armando Greene  Primary Physician:  Yuri Reilly DO  Referring Physician:  Anderson Velez MD  Group:  [facility]  Interpreting Physician:  Deann Jara MD    SUMMARY    PROCEDURE INFORMATION:  This was a technically difficult study  LEFT VENTRICLE:  Size was normal   Systolic function was normal  Ejection fraction was estimated to be 55 %  Wall thickness was at the upper limits of normal   Doppler parameters were consistent with abnormal left ventricular relaxation  (grade 1 diastolic dysfunction)  RIGHT VENTRICLE:  The size was normal   Systolic function was normal     MITRAL VALVE:  There was trace regurgitation  TRICUSPID VALVE:  There was trace regurgitation  HISTORY: PRIOR HISTORY: HTN; CVA    PROCEDURE: The procedure was performed at the bedside  This was a routine  study  The transthoracic approach was used  The study included complete 2D  imaging, M-mode, complete spectral Doppler, and color Doppler  Echocardiographic views were limited due to decreased penetration and lung  interference  This was a technically difficult study      LEFT VENTRICLE: Size was normal  Systolic function was normal  Ejection  fraction was estimated to be 55 %  There were no regional wall motion  abnormalities  Wall thickness was at the upper limits of normal  DOPPLER:  Doppler parameters were consistent with abnormal left ventricular relaxation  (grade 1 diastolic dysfunction)  RIGHT VENTRICLE: The size was normal  Systolic function was normal  Wall  thickness was normal     LEFT ATRIUM: Size was normal     RIGHT ATRIUM: Size was normal     MITRAL VALVE: Valve structure was normal  There was normal leaflet separation  DOPPLER: The transmitral velocity was within the normal range  There was no  evidence for stenosis  There was trace regurgitation  AORTIC VALVE: The valve was trileaflet  Leaflets exhibited normal thickness and  normal cuspal separation  DOPPLER: Transaortic velocity was within the normal  range  There was no evidence for stenosis  There was no regurgitation  TRICUSPID VALVE: The valve structure was normal  There was normal leaflet  separation  DOPPLER: The transtricuspid velocity was within the normal range  There was no evidence for stenosis  There was trace regurgitation  The  tricuspid jet envelope definition was inadequate for estimation of RV systolic  pressure  There are no indirect findings suggestive of moderate or severe  pulmonary hypertension  PULMONIC VALVE: Leaflets exhibited normal thickness, no calcification, and  normal cuspal separation  DOPPLER: The transpulmonic velocity was within the  normal range  There was no regurgitation  PERICARDIUM: There was no pericardial effusion  The pericardium was normal in  appearance  AORTA: The root exhibited normal size  SYSTEMIC VEINS: IVC: The inferior vena cava was not well visualized      SYSTEM MEASUREMENT TABLES    2D  %FS: 29 38 %  Ao Diam: 2 84 cm  EDV(Teich): 71 79 ml  EF(Teich): 56 83 %  ESV(Teich): 31 ml  IVSd: 1 19 cm  LA Area: 10 59 cm2  LA Diam: 2 61 cm  LVEDV MOD A4C: 90 22 ml  LVEF MOD A4C: 75 02 %  LVESV MOD A4C: 22 54 ml  LVIDd: 4 04 cm  LVIDs: 2 85 cm  LVLd A4C: 7 42 cm  LVLs A4C: 5 4 cm  LVPWd: 1 18 cm  RA Area: 8 57 cm2  RVIDd: 2 42 cm  SV MOD A4C: 67 68 ml  SV(Teich): 40 8 ml    MM  TAPSE: 2 24 cm    PW  E': 0 08 m/s  E/E': 10 43  MV A Chan: 0 84 m/s  MV Dec [place]: 5 06 m/s2  MV DecT: 173 6 ms  MV E Chan: 0 88 m/s  MV E/A Ratio: 1 04  MV PHT: 50 34 ms  MVA By PHT: 4 37 cm2    [facility] Accredited Echocardiography Laboratory    Prepared and electronically signed by    Shira Horvath MD  Signed [id number] 13:57:26       Results for orders placed during the hospital encounter of 16   TAN[place]Y    Narrative [address]  [facility], [address] [facility]  [phone]    Transesophageal Echocardiogram  2D, Doppler, and Color Doppler    Study date:  2016    Patient: Jonnie Jackson  MR number: [id number]  Account number: [de-identified]  : 1955  Age: 61 years  Gender: Female  Status: Inpatient  Location: Echo lab  Height: 69 in  Weight: 213 lb  BP: 131/ 69 mmHg    Indications: CVA    Diagnoses: 56 - CVA    Sonographer:  Case Red RN, [facility]  Primary Physician:  Shanique Magdaleno DO  Group:  [facility]  Cardiology Fellow:  Stephy Power MD  Interpreting Physician:  Courtney Minor MD    SUMMARY    LEFT VENTRICLE:  Systolic function was normal  Ejection fraction was estimated to be 65 %  There were no regional wall motion abnormalities  LEFT ATRIUM:  The atrium was mildly dilated  ATRIAL SEPTUM:  There was redundancy of the septum, with borderline criteria for aneurysm  There was a patent foramen ovale  A bubble study was performed  There was a right-to-left shunt, in the baseline  state  MITRAL VALVE:  There was trace regurgitation  PULMONIC VALVE:  There was mild regurgitation  HISTORY: PRIOR HISTORY: Hypertension    PROCEDURE: The procedure was performed in the echo lab  This was a routine  study   The risks and alternatives of the procedure were explained to the  patient's next of kin and informed consent was obtained  The transesophageal  approach was used  The study included complete 2D imaging, complete spectral  Doppler, and color Doppler  The heart rate was 82 bpm, at the start of the  study  An adult multiplane probe was inserted by the cardiology fellow under  direct supervision of the attending cardiologist  Intubated with ease  One  intubation attempt(s)  There was no blood detected on the probe  Image quality  was good  MEDICATIONS: Propofol, 180 mg  Anesthesia administered by anesthesia  team  Lidocaine, 60mg  Ketamine, 20  LEFT VENTRICLE: Size was normal  Systolic function was normal  Ejection  fraction was estimated to be 65 %  There were no regional wall motion  abnormalities  Wall thickness was normal     RIGHT VENTRICLE: The size was normal  Systolic function was normal  Wall  thickness was normal     LEFT ATRIUM: The atrium was mildly dilated  ATRIAL SEPTUM: There was redundancy of the septum, with borderline criteria for  aneurysm  There was a patent foramen ovale  A bubble study was performed  There  was a right-to-left shunt, in the baseline state  RIGHT ATRIUM: Size was normal  A chiari network is noted  No thrombus was  identified  MITRAL VALVE: Valve structure was normal  There was normal leaflet separation  There was no echocardiographic evidence of vegetation  DOPPLER: There was trace  regurgitation  AORTIC VALVE: The valve was trileaflet  Leaflets exhibited normal thickness and  normal cuspal separation  There was no echocardiographic evidence of  vegetation  DOPPLER: There was no regurgitation  TRICUSPID VALVE: The valve structure was normal  There was normal leaflet  separation  There was no echocardiographic evidence of vegetation  DOPPLER:  There was no regurgitation  PULMONIC VALVE: Leaflets exhibited normal thickness, no calcification, and  normal cuspal separation   There was no echocardiographic evidence of  vegetation  DOPPLER: There was mild regurgitation  PERICARDIUM: There was no pericardial effusion  AORTA: The root exhibited normal size  There was no atheroma  There was no  evidence for dissection  There was no evidence for aneurysm  [address] Accredited Echocardiography Laboratory    Prepared and electronically signed by    Chano Freeman MD  Signed 01-Jun-2016 16:39:27       No results found for this or any previous visit  No results found for this or any previous visit  Imaging: I have personally reviewed pertinent reports  Xr Chest 1 View Portable    Result Date: 2/23/2019  Narrative: CHEST INDICATION:   chest pain  COMPARISON:  1/18/2017 EXAM PERFORMED/VIEWS:  XR CHEST PORTABLE FINDINGS: Cardiomediastinal silhouette appears unremarkable  The lungs are clear  No pneumothorax or pleural effusion  Osseous structures appear within normal limits for patient age  Impression: No acute cardiopulmonary disease   Workstation performed: [id number]       EKG reviewed personally: NSR  Telemetry reviewed personally: NSR    Assessment:  Principal Problem:    Chest pain at rest  Active Problems:    Hypertension    Expressive aphasia    Spastic hemiplegia of right dominant side as late effect of cerebral infarction [facility])      Code Status: Level 1 - Full Code

## 2019-02-25 NOTE — PROGRESS NOTES
Patient's BP 92/50 manually this morning  Patient asymptomatic  Phyllis German MD notified  Order for NS bolus placed and infusing at this time  Lexiscan stress test on hold at this time due to low BP

## 2019-02-26 ENCOUNTER — TRANSCRIBE ORDERS (OUTPATIENT)
Dept: PHYSICAL THERAPY | Facility: CLINIC | Age: 64
End: 2019-02-26

## 2019-02-26 DIAGNOSIS — I67.89 ACUTE, BUT ILL-DEFINED, CEREBROVASCULAR DISEASE: Primary | ICD-10-CM

## 2019-02-26 LAB
ATRIAL RATE: 63 BPM
ATRIAL RATE: 68 BPM
ATRIAL RATE: 72 BPM
P AXIS: 38 DEGREES
P AXIS: 40 DEGREES
P AXIS: 42 DEGREES
PR INTERVAL: 124 MS
PR INTERVAL: 128 MS
PR INTERVAL: 138 MS
QRS AXIS: 15 DEGREES
QRS AXIS: 30 DEGREES
QRS AXIS: 6 DEGREES
QRSD INTERVAL: 88 MS
QT INTERVAL: 412 MS
QT INTERVAL: 416 MS
QT INTERVAL: 430 MS
QTC INTERVAL: 440 MS
QTC INTERVAL: 442 MS
QTC INTERVAL: 451 MS
T WAVE AXIS: 45 DEGREES
T WAVE AXIS: 53 DEGREES
T WAVE AXIS: 81 DEGREES
TSH SERPL DL<=0.05 MIU/L-ACNC: 2.21 UIU/ML (ref 0.36–3.74)
VENTRICULAR RATE: 63 BPM
VENTRICULAR RATE: 68 BPM
VENTRICULAR RATE: 72 BPM

## 2019-02-26 PROCEDURE — 93010 ELECTROCARDIOGRAM REPORT: CPT | Performed by: INTERNAL MEDICINE

## 2019-02-26 PROCEDURE — 99232 SBSQ HOSP IP/OBS MODERATE 35: CPT | Performed by: PHYSICIAN ASSISTANT

## 2019-02-26 PROCEDURE — 84443 ASSAY THYROID STIM HORMONE: CPT | Performed by: INTERNAL MEDICINE

## 2019-02-26 RX ORDER — SODIUM CHLORIDE 9 MG/ML
100 INJECTION, SOLUTION INTRAVENOUS CONTINUOUS
Status: DISCONTINUED | OUTPATIENT
Start: 2019-02-26 | End: 2019-02-27

## 2019-02-26 RX ADMIN — ENOXAPARIN SODIUM 40 MG: 40 INJECTION SUBCUTANEOUS at 09:42

## 2019-02-26 RX ADMIN — ESCITALOPRAM OXALATE 10 MG: 10 TABLET ORAL at 09:43

## 2019-02-26 RX ADMIN — SODIUM CHLORIDE 100 ML/HR: 0.9 INJECTION, SOLUTION INTRAVENOUS at 20:04

## 2019-02-26 RX ADMIN — SODIUM CHLORIDE 125 ML/HR: 0.9 INJECTION, SOLUTION INTRAVENOUS at 10:44

## 2019-02-26 RX ADMIN — ATORVASTATIN CALCIUM 40 MG: 40 TABLET, FILM COATED ORAL at 09:43

## 2019-02-26 RX ADMIN — SODIUM CHLORIDE 500 ML: 0.9 INJECTION, SOLUTION INTRAVENOUS at 07:55

## 2019-02-26 RX ADMIN — VITAMIN D, TAB 1000IU (100/BT) 1000 UNITS: 25 TAB at 09:43

## 2019-02-26 RX ADMIN — ASPIRIN 324 MG: 81 TABLET, COATED ORAL at 09:42

## 2019-02-26 RX ADMIN — PANTOPRAZOLE SODIUM 40 MG: 40 TABLET, DELAYED RELEASE ORAL at 05:26

## 2019-02-26 NOTE — PROGRESS NOTES
Progress Note - Pau Corea 1955, 61 y o  female MRN: [id number]    Unit/Bed#: 012-36 Encounter: [id number]    Primary Care Provider: Jj Romero DO   Date and time admitted to hospital: 2/23/2019 11:57 AM        * Chest pain at rest  Assessment & Plan  Left midsternal chest pain-no radiation  Troponin negative x 3   Echo:   LEFT VENTRICLE:  Systolic function was normal  Ejection fraction was estimated to be 60 %  There were no regional wall motion abnormalities  Doppler parameters were consistent with abnormal left ventricular relaxation (grade 1 diastolic dysfunction)      RIGHT VENTRICLE:  The ventricle was mildly dilated  Systolic function was normal      MITRAL VALVE:  There was trace regurgitation      TRICUSPID VALVE:  There was trace regurgitation  Nuclear med stress test ordered however this has been postponed due to hypotension  Provide supportive care    Hypotension  Assessment & Plan  The patient has been hypotensive with blood pressure of 77/39 on 2/24/19  The patient was given IV bolus and started on IV fluids  She continues to remain hypotensive at 96/50  Given her hypotension the stress test has been postponed  Will continue IV fluids  Continue to monitor  Epigastric pain  Assessment & Plan  Chest pain/epigastric pain  CT abdomen pelvis: No acute intra-abdominal abnormality  Cardiology following  Stress test ordered but currently on hold due to hypotension  Spastic hemiplegia of right dominant side as late effect of cerebral infarction [facility])  Assessment & Plan  - Continue Lipitor and aspirin       Expressive aphasia  Assessment & Plan  Patient does have chronic baseline deficits due to prior stroke including right -sided weakness +1 Upper and Lower EXT  Aphasia         VTE Pharmacologic Prophylaxis:   Pharmacologic: Enoxaparin (Lovenox)  Mechanical VTE Prophylaxis in Place: Yes    Patient Centered Rounds: I have performed bedside rounds with nursing staff today     Discussions with Specialists or Other Care Team Provider: nursing, CM, and attending    Education and Discussions with Family / Patient: mother updated at bedside    Time Spent for Care: 30 minutes  More than 50% of total time spent on counseling and coordination of care as described above  Current Length of Stay: 0 day(s)    Current Patient Status: Inpatient   Certification Statement: The patient, admitted on an observation basis, will now require > 2 midnight hospital stay due to need for IV fluids due to persisent hypotension  Discharge Plan: TBD    Code Status: Level 1 - Full Code      Subjective: The patient was seen and examined  The patient continues to have chest/epigastric pain  Objective:     Vitals:   Temp (24hrs), Av 8 °F (36 6 °C), Min:97 6 °F (36 4 °C), Max:98 °F (36 7 °C)    Temp:  [97 6 °F (36 4 °C)-98 °F (36 7 °C)] 97 8 °F (36 6 °C)  HR:  [65-77] 68  Resp:  [16-18] 16  BP: (88-96)/(50-56) 91/52  SpO2:  [92 %-95 %] 93 %  Body mass index is 27 25 kg/m²  Input and Output Summary (last 24 hours): Intake/Output Summary (Last 24 hours) at 2019 1623  Last data filed at 2019 1552  Gross per 24 hour   Intake 1690 ml   Output 2375 ml   Net -685 ml       Physical Exam:     Physical Exam   Constitutional: She appears well-developed and well-nourished  She is active and cooperative  Cardiovascular: Normal rate and regular rhythm  Pulmonary/Chest: Effort normal and breath sounds normal  She has no wheezes  She has no rhonchi  She has no rales  Abdominal: Soft  Normal appearance and bowel sounds are normal  She exhibits no distension  There is no tenderness  Neurological: She is alert  Residual right sided weakness for old CVA  patient has expressive aphagia  Skin: Skin is warm, dry and intact  Nursing note and vitals reviewed        Additional Data:     Labs:    Results from last 7 days   Lab Units 19  1427   WBC Thousand/uL 6 74   HEMOGLOBIN g/dL 13 1   HEMATOCRIT % 40 7   PLATELETS Thousands/uL 250   NEUTROS PCT % 41*   LYMPHS PCT % 47*   MONOS PCT % 9   EOS PCT % 2     Results from last 7 days   Lab Units 02/25/19  1427   SODIUM mmol/L 144   POTASSIUM mmol/L 4 0   CHLORIDE mmol/L 107   CO2 mmol/L 29   BUN mg/dL 20   CREATININE mg/dL 0 79   ANION GAP mmol/L 8   CALCIUM mg/dL 8 7   ALBUMIN g/dL 3 0*   TOTAL BILIRUBIN mg/dL 0 30   ALK PHOS U/L 136*   ALT U/L 40   AST U/L 22   GLUCOSE RANDOM mg/dL 76     Results from last 7 days   Lab Units 02/25/19  1427   INR  1 00             Results from last 7 days   Lab Units 02/25/19  1510   LACTIC ACID mmol/L 0 8           * I Have Reviewed All Lab Data Listed Above  * Additional Pertinent Lab Tests Reviewed: All Labs Within Last 24 Hours Reviewed    Imaging:    Imaging Reports Reviewed Today Include: none  Imaging Personally Reviewed by Myself Includes:  none    Recent Cultures (last 7 days):           Last 24 Hours Medication List:     Current Facility-Administered Medications:  acetaminophen 650 mg Oral Q6H PRN Hany Booker MD    albuterol 2 5 mg Nebulization Q6H PRN Hany Booker MD    aspirin 324 mg Oral Daily Hany Booker MD    atorvastatin 40 mg Oral Daily Gerri Y Swank, CRNP    cholecalciferol 1,000 Units Oral Daily Gerri Y Swank, CRNP    enoxaparin 40 mg Subcutaneous Daily Gerri Y Swank, CRNP    escitalopram 10 mg Oral Daily Gerri Y Swank, CRNP    nitroglycerin 0 4 mg Sublingual Q5 Min PRN Hany Booker MD    pantoprazole 40 mg Oral Early Morning Hany Booker MD    sodium chloride 125 mL/hr Intravenous Continuous Jamin Kline PA-C Last Rate: 125 mL/hr (02/26/19 1044)   traMADol 50 mg Oral Q8H PRN Hany Booker MD         Today, Patient Was Seen By: Jamin Kline PA-C    ** Please Note: Dictation voice to text software may have been used in the creation of this document   **

## 2019-02-26 NOTE — PLAN OF CARE
Problem: Prexisting or High Potential for Compromised Skin Integrity  Goal: Skin integrity is maintained or improved  Description  INTERVENTIONS:  - Identify patients at risk for skin breakdown  - Assess and monitor skin integrity  - Assess and monitor nutrition and hydration status  - Monitor labs (i e  albumin)  - Assess for incontinence   - Turn and reposition patient  - Assist with mobility/ambulation  - Relieve pressure over bony prominences  - Avoid friction and shearing  - Provide appropriate hygiene as needed including keeping skin clean and dry  - Evaluate need for skin moisturizer/barrier cream  - Collaborate with interdisciplinary team (i e  Nutrition, Rehabilitation, etc )   - Patient/family teaching  Outcome: Progressing     Problem: PAIN - ADULT  Goal: Verbalizes/displays adequate comfort level or baseline comfort level  Description  Interventions:  - Encourage patient to monitor pain and request assistance  - Assess pain using appropriate pain scale  - Administer analgesics based on type and severity of pain and evaluate response  - Implement non-pharmacological measures as appropriate and evaluate response  - Consider cultural and social influences on pain and pain management  - Notify physician/advanced practitioner if interventions unsuccessful or patient reports new pain  Outcome: Progressing     Problem: INFECTION - ADULT  Goal: Absence or prevention of progression during hospitalization  Description  INTERVENTIONS:  - Assess and monitor for signs and symptoms of infection  - Monitor lab/diagnostic results  - Monitor all insertion sites, i e  indwelling lines, tubes, and drains  - Monitor endotracheal (as able) and nasal secretions for changes in amount and color  - [place] appropriate cooling/warming therapies per order  - Administer medications as ordered  - Instruct and encourage patient and family to use good hand hygiene technique  - Identify and instruct in appropriate isolation precautions for identified infection/condition  Outcome: Progressing  Goal: Absence of fever/infection during neutropenic period  Description  INTERVENTIONS:  - Monitor WBC   Outcome: Progressing     Problem: SAFETY ADULT  Goal: Patient will remain free of falls  Description  INTERVENTIONS:  - Assess patient frequently for physical needs  -  Identify cognitive and physical deficits and behaviors that affect risk of falls    -  [place] fall precautions as indicated by assessment   - Educate patient/family on patient safety including physical limitations  - Instruct patient to call for assistance with activity based on assessment  - Modify environment to reduce risk of injury  - Consider OT/PT consult to assist with strengthening/mobility  Outcome: Progressing  Goal: Maintain or return to baseline ADL function  Description  INTERVENTIONS:  -  Assess patient's ability to carry out ADLs; assess patient's baseline for ADL function and identify physical deficits which impact ability to perform ADLs (bathing, care of mouth/teeth, toileting, grooming, dressing, etc )  - Assess/evaluate cause of self-care deficits   - Assess range of motion  - Assess patient's mobility; develop plan if impaired  - Assess patient's need for assistive devices and provide as appropriate  - Encourage maximum independence but intervene and supervise when necessary  ¯ Involve family in performance of ADLs  ¯ Assess for home care needs following discharge   ¯ Request OT consult to assist with ADL evaluation and planning for discharge  ¯ Provide patient education as appropriate  Outcome: Progressing  Goal: Maintain or return mobility status to optimal level  Description  INTERVENTIONS:  - Assess patient's baseline mobility status (ambulation, transfers, stairs, etc )    - Identify cognitive and physical deficits and behaviors that affect mobility  - Identify mobility aids required to assist with transfers and/or ambulation (gait belt, sit-to-stand, lift, walker, cane, etc )  - [place] fall precautions as indicated by assessment  - Record patient progress and toleration of activity level on Mobility SBAR; progress patient to next Phase/Stage  - Instruct patient to call for assistance with activity based on assessment  - Request Rehabilitation consult to assist with strengthening/weightbearing, etc   Outcome: Progressing     Problem: DISCHARGE PLANNING  Goal: Discharge to home or other facility with appropriate resources  Description  INTERVENTIONS:  - Identify barriers to discharge w/patient and caregiver  - Arrange for needed discharge resources and transportation as appropriate  - Identify discharge learning needs (meds, wound care, etc )    - Refer to Case Management Department for coordinating discharge planning if the patient needs post-hospital services based on physician/advanced practitioner order or complex needs related to functional status, cognitive ability, or social support system   Outcome: Progressing     Problem: Knowledge Deficit  Goal: Patient/family/caregiver demonstrates understanding of disease process, treatment plan, medications, and discharge instructions  Description  Complete learning assessment and assess knowledge base    Interventions:  - Provide teaching at level of understanding  - Provide teaching via preferred learning methods  Outcome: Progressing     Problem: DISCHARGE PLANNING - CARE MANAGEMENT  Goal: Discharge to post-acute care or home with appropriate resources  Description  INTERVENTIONS:  - Conduct assessment to determine patient/family and health care team treatment goals, and need for post-acute services based on payer coverage, community resources, and patient preferences, and barriers to discharge  - Address psychosocial, clinical, and financial barriers to discharge as identified in assessment in conjunction with the patient/family and health care team  - Arrange appropriate level of post-acute services according to patient's   needs and preference and payer coverage in collaboration with the physician and health care team  - Communicate with and update the patient/family, physician, and health care team regarding progress on the discharge plan  - Arrange appropriate transportation to post-acute venues  -home on dc with family support and Outpatient follow up   Outcome: Progressing     Problem: Potential for Falls  Goal: Patient will remain free of falls  Description  INTERVENTIONS:  - Assess patient frequently for physical needs  -  Identify cognitive and physical deficits and behaviors that affect risk of falls    -  [place] fall precautions as indicated by assessment   - Educate patient/family on patient safety including physical limitations  - Instruct patient to call for assistance with activity based on assessment  - Modify environment to reduce risk of injury  - Consider OT/PT consult to assist with strengthening/mobility  Outcome: Progressing

## 2019-02-26 NOTE — ASSESSMENT & PLAN NOTE
Chest pain/epigastric pain  CT abdomen pelvis: No acute intra-abdominal abnormality  Cardiology following  Stress test ordered but currently on hold due to hypotension

## 2019-02-26 NOTE — PROGRESS NOTES
[facility] Progress Note  Patient: Lynn Kerr 61 y o  female   MRN: [id number]  PCP: Sofía Greco DO  Unit/Bed#: 317-36 Encounter: [id number]  Date Of Visit: 19    Assessment:    Principal Problem:    Chest pain at rest  Active Problems:    Expressive aphasia    Hypertension    Spastic hemiplegia of right dominant side as late effect of cerebral infarction (HCC)    Hypotension    Epigastric pain      Plan:    · Had low blood pressure overnight 77/39, was 82/52 prior to stress test this AM which had to be postponed  · BP improved with 0 5 L normal saline IV bolus, patient remains afebrile with a normal WBC, UA ordered  · CT abdomen and pelvis with IV contrast revealed no acute intra-abdominal abnormality  · IV Protonix for GI protection  · Etiology of hypotension unclear, likely due to hypovolemia  · Proceed with stress test in AM in light of her cardiac risk factors & subsequently discharge home if negative  · Chest pain appears atypical and most likely musculoskeletal in etiology      VTE Pharmacologic Prophylaxis:   Pharmacologic: Enoxaparin (Lovenox)  Mechanical VTE Prophylaxis in Place: Yes    Patient Centered Rounds: I have performed bedside rounds with nursing staff today  Current Length of Stay: 0 day(s)    Current Patient Status: Observation     Code Status: Level 1 - Full Code      Subjective:   Patient seen and examined this afternoon  Complaining of painful tenderness in the epigastric and periumbilical region rated at 4/10 in severity  She also points to painful tenderness at her substernal and precordial region  Objective:     Vitals:   Temp (24hrs), Av 6 °F (36 4 °C), Min:97 5 °F (36 4 °C), Max:97 6 °F (36 4 °C)    Temp:  [97 5 °F (36 4 °C)-97 6 °F (36 4 °C)] 97 6 °F (36 4 °C)  HR:  [61-77] 61  Resp:  [18] 18  BP: ()/(39-64) 104/64  SpO2:  [94 %-96 %] 95 %  Body mass index is 27 25 kg/m²       Input and Output Summary (last 24 hours): Intake/Output Summary (Last 24 hours) at 2/25/2019 2017  Last data filed at 2/25/2019 1927  Gross per 24 hour   Intake 1140 ml   Output 1675 ml   Net -535 ml       Physical Exam:   General: in no overt painful distress  HEENT: oral mucosa moist, not pale, not jaundiced  Chest: clear lungs, no wheeze  Heart: S1 S2 audible, regular  Abd: soft, nontender, bowel sounds present  Psych: appropriately oriented in all spheres  Neuro: Awake, alert, old right spastic hemiplegia      Additional Data:     Labs:    Results from last 7 days   Lab Units 02/25/19  1427   WBC Thousand/uL 6 74   HEMOGLOBIN g/dL 13 1   HEMATOCRIT % 40 7   PLATELETS Thousands/uL 250   NEUTROS PCT % 41*   LYMPHS PCT % 47*   MONOS PCT % 9   EOS PCT % 2     Results from last 7 days   Lab Units 02/25/19  1427   POTASSIUM mmol/L 4 0   CHLORIDE mmol/L 107   CO2 mmol/L 29   BUN mg/dL 20   CREATININE mg/dL 0 79   CALCIUM mg/dL 8 7   ALK PHOS U/L 136*   ALT U/L 40   AST U/L 22     Results from last 7 days   Lab Units 02/25/19  1427   INR  1 00     Invalid input(s): JAMES    * I Have Reviewed All Lab Data Listed Above  * Additional Pertinent Lab Tests Reviewed:  [place] Admission Reviewed    Imaging:    Imaging Reports Reviewed Today Include: CT      Recent Cultures (last 7 days):           Last 24 Hours Medication List:     Current Facility-Administered Medications:  acetaminophen 650 mg Oral Q6H PRN Rhett Griffin MD   albuterol 2 5 mg Nebulization Q6H PRN Rhett Griffin MD   aspirin 324 mg Oral Daily Rhett Griffin MD   atorvastatin 40 mg Oral Daily Gerri Y Swank, CRNP   cholecalciferol 1,000 Units Oral Daily Gerri Y Swank, CRNP   enoxaparin 40 mg Subcutaneous Daily Gerri Y Swank, CRNP   escitalopram 10 mg Oral Daily Gerri Y Swank, CRNP   nitroglycerin 0 4 mg Sublingual Q5 Min PRN Rhett Griffin MD   [START ON 2/26/2019] pantoprazole 40 mg Oral Early Morning Rhett Griffin MD   pantoprazole 40 mg Intravenous Once Abner Foster MD   traMADol 50 mg Oral Q8H PRN Abner Foster MD        Today, Patient Was Seen By: Abner Foster MD    ** Please Note: Dragon 360 Dictation voice to text software may have been used in the creation of this document   **

## 2019-02-26 NOTE — ASSESSMENT & PLAN NOTE
The patient has been hypotensive with blood pressure of 77/39 on 2/24/19  The patient was given IV bolus and started on IV fluids  She continues to remain hypotensive at 96/50  Given her hypotension the stress test has been postponed  Will continue IV fluids  Continue to monitor

## 2019-02-26 NOTE — PROGRESS NOTES
Cardiology Progress Note - Patti White 61 y o  female MRN: [id number]    Unit/Bed#: 418-01 Encounter: [id number]      Assessment:  1  Atypical chest pain  2  History of CVA  3  PFO  4  Hyperlipidemia   5  Expressive aphasia     Plan:  Patient still w/ reproducible chest pain on exam  Troponin negative x 3, no signs of ischemia on tele  Likely musculoskeletal in origin; however given risk factors for CAD including hyperlipidemia and prior stroke will check a nuclear stress test  Stress test was postponed yesterday due to BP  BP was 96/56 this morning and another fluid bolus was ordered  Will recheck BP after fluid bolus and proceed with stress test if BP improves  Subjective:   Patient seen and examined  She still reproducible chest pain exam   She denies any shortness of breath, lightheadedness, dizziness, or palpitations  Review of Systems   Unable to perform ROS: patient nonverbal   Cardiovascular: Positive for chest pain  Respiratory: Negative for shortness of breath  Neurological: Positive for numbness and paresthesias  Negative for dizziness and light-headedness  Psychiatric/Behavioral: Negative  Objective:   Vitals: Blood pressure 96/56, pulse 65, temperature 98 °F (36 7 °C), temperature source Temporal, resp  rate 18, height 5' 4" (1 626 m), weight 72 kg (158 lb 11 7 oz), SpO2 95 %  , Body mass index is 27 25 kg/m² ,   Orthostatic Blood Pressures      Most Recent Value   Blood Pressure  96/56 filed at 02/26/2019 0746   Patient Position - Orthostatic VS  Lying filed at 02/26/2019 5230         Systolic (65ZSB), QSA:64 , Min:82 , UHN:782     Diastolic (61YKX), WXO:89, Min:52, Max:64      Intake/Output Summary (Last 24 hours) at 2/26/2019 0944  Last data filed at 2/26/2019 0857  Gross per 24 hour   Intake 1930 ml   Output 2475 ml   Net -545 ml     Weight (last 2 days)     None            Telemetry Review: artifact  EKG personally reviewed by Trevor Maguire PA-C       Physical Exam Constitutional: She is oriented to person, place, and time  No distress  HENT:   Head: Normocephalic and atraumatic  Eyes: Pupils are equal, round, and reactive to light  Neck: Normal range of motion  Carotid bruit is not present  Cardiovascular: Normal rate and regular rhythm  No murmur heard  Pulmonary/Chest: Effort normal and breath sounds normal  No respiratory distress  She has no wheezes  She has no rales  Abdominal: Soft  Bowel sounds are normal    Musculoskeletal: She exhibits tenderness  She exhibits no edema  Arms:  Neurological: She is alert and oriented to person, place, and time  Skin: Skin is warm and dry  She is not diaphoretic  No erythema  Psychiatric: She has a normal mood and affect  Her behavior is normal    Vitals reviewed          Laboratory Results:  Results from last 7 days   Lab Units 02/25/19 1427 02/23/19 2018 02/23/19  1212   CK TOTAL U/L 99  --   --    TROPONIN I ng/mL <0 02 <0 02 <0 02       CBC with diff:   Results from last 7 days   Lab Units 02/25/19 1427 02/24/19 0448 02/23/19 2017 02/23/19  1212   WBC Thousand/uL 6 74 6 44  --  5 71   HEMOGLOBIN g/dL 13 1 12 8  --  15 2   HEMATOCRIT % 40 7 39 3  --  46 9*   MCV fL 96 95  --  95   PLATELETS Thousands/uL 250 249 267 301   MCH pg 31 0 31 0  --  30 7   MCHC g/dL 32 2 32 6  --  32 4   RDW % 12 9 12 6  --  12 7   MPV fL 10 0 9 9 9 6 9 3   NRBC AUTO /100 WBCs 0  --   --  0         CMP:  Results from last 7 days   Lab Units 02/25/19 1427 02/24/19 0448 02/23/19  1212   POTASSIUM mmol/L 4 0 3 8 4 1   CHLORIDE mmol/L 107 110* 105   CO2 mmol/L 29 27 30   BUN mg/dL 20 15 14   CREATININE mg/dL 0 79 0 64 0 79   CALCIUM mg/dL 8 7 8 4 9 4   AST U/L 22  --  40   ALT U/L 40  --  62   ALK PHOS U/L 136*  --  164*   EGFR ml/min/1 73sq m 80 95 80         BMP:  Results from last 7 days   Lab Units 02/25/19  1427 02/24/19  0448 02/23/19  1212   POTASSIUM mmol/L 4 0 3 8 4 1   CHLORIDE mmol/L 107 110* 105   CO2 mmol/L 29 27 30 BUN mg/dL 20 15 14   CREATININE mg/dL 0 79 0 64 0 79   CALCIUM mg/dL 8 7 8 4 9 4       BNP: No results for input(s): BNP in the last 72 hours  Magnesium:   Results from last 7 days   Lab Units 19  0448 19  1212   MAGNESIUM mg/dL 2 0 2 1       Coags:   Results from last 7 days   Lab Units 19  1427 19  1212   PTT seconds  --  23*   INR  1 00 0 95       TSH:        Hemoglobin A1C       Lipid Profile:       Cardiac testing:   Results for orders placed during the hospital encounter of 19   Echo complete with contrast if indicated    Narrative [address]  [address] [address], [place] [address]  [phone]    Transthoracic Echocardiogram  2D, M-mode, Doppler, and Color Doppler    Study date:  2019    Patient: Connie Nicole  MR number: [id number]  Account number: [de-identified]  : 1955  Age: 61 years  Gender: Female  Status: Outpatient  Location: Bedside  Height: 64 in  Weight: 158 lb  BP: 84/ 58 mmHg    Indications: chest pain    Diagnoses: 786 59 - CHEST PAIN NEC    Sonographer:  HEYDI Zamora  Primary Physician:  Jayesh Roberson DO  Referring Physician:  Odalis Carroll DO  Group:  [facility]  Interpreting Physician:  Art Lanes, MD    SUMMARY    LEFT VENTRICLE:  Systolic function was normal  Ejection fraction was estimated to be 60 %  There were no regional wall motion abnormalities  Doppler parameters were consistent with abnormal left ventricular relaxation (grade 1 diastolic dysfunction)  RIGHT VENTRICLE:  The ventricle was mildly dilated  Systolic function was normal     MITRAL VALVE:  There was trace regurgitation  TRICUSPID VALVE:  There was trace regurgitation  HISTORY: PRIOR HISTORY: cva    PROCEDURE: The procedure was performed at the bedside  This was a routine study  The transthoracic approach was used   The study included complete 2D imaging, M-mode, complete spectral Doppler, and color Doppler  The heart rate was 75 bpm,  at the start of the study  Echocardiographic views were limited due to restricted patient mobility, decreased penetration, and lung interference  This was a technically difficult study  LEFT VENTRICLE: Size was normal  Systolic function was normal  Ejection fraction was estimated to be 60 %  There were no regional wall motion abnormalities  Wall thickness was normal  DOPPLER: Doppler parameters were consistent with  abnormal left ventricular relaxation (grade 1 diastolic dysfunction)  RIGHT VENTRICLE: The ventricle was mildly dilated  Systolic function was normal  Wall thickness was normal     LEFT ATRIUM: Size was normal     RIGHT ATRIUM: Size was normal     MITRAL VALVE: Valve structure was normal  There was normal leaflet separation  DOPPLER: The transmitral velocity was within the normal range  There was no evidence for stenosis  There was trace regurgitation  AORTIC VALVE: The valve was trileaflet  Leaflets exhibited normal thickness and normal cuspal separation  DOPPLER: Transaortic velocity was within the normal range  There was no evidence for stenosis  There was no significant  regurgitation  TRICUSPID VALVE: The valve structure was normal  There was normal leaflet separation  DOPPLER: The transtricuspid velocity was within the normal range  There was no evidence for stenosis  There was trace regurgitation  Estimated peak PA  pressure was 30 mmHg  PULMONIC VALVE: Leaflets exhibited normal thickness, no calcification, and normal cuspal separation  DOPPLER: The transpulmonic velocity was within the normal range  There was no significant regurgitation  PERICARDIUM: There was no pericardial effusion  The pericardium was normal in appearance  AORTA: The root exhibited normal size  SYSTEMIC VEINS: IVC: The inferior vena cava was normal in size   Respirophasic changes were normal     MEASUREMENT TABLES    OTHER ECHO MEASUREMENTS  (Reference normals)  Estimated CVP   5 mmHg   (--)    SYSTEM MEASUREMENT TABLES    2D  %FS: 24 82 %  Ao Diam: 2 8 cm  EDV(Teich): 61 53 ml  EF(Teich): 49 9 %  ESV(Teich): 30 83 ml  IVSd: 0 98 cm  LA Area: 8 78 cm2  LA Diam: 2 22 cm  LVEDV MOD A4C: 48 76 ml  LVEF MOD A4C: 61 25 %  LVESV MOD A4C: 18 9 ml  LVIDd: 3 79 cm  LVIDs: 2 85 cm  LVLd A4C: 6 83 cm  LVLs A4C: 5 65 cm  LVPWd: 1 03 cm  RA Area: 9 89 cm2  RV DIAM: 2 79 cm  SV MOD A4C: 29 86 ml  SV(Teich): 30 7 ml    CW  AV Vmax: 1 16 m/s  AV maxP 41 mmHg  TR Vmax: 2 52 m/s  TR maxP 37 mmHg    MM  TAPSE: 2 69 cm    PW  E' Sept: 0 08 m/s  E/E' Sept: 5 49  LVOT Vmax: 0 93 m/s  LVOT maxPG: 3 48 mmHg  MV A Chan: 0 6 m/s  MV Dec Ponce: 1 8 m/s2  MV DecT: 240 67 ms  MV E Chan: 0 43 m/s  MV E/A Ratio: 0 73    [facility] Accredited Echocardiography Laboratory    Prepared and electronically signed by    Neri Deng MD  Signed [id number] 13:09:04       Results for orders placed during the hospital encounter of 16   TANMAY    Narrative [facility] [address]  [facility] [place], [address] [place] [address]  [phone]    Transesophageal Echocardiogram  2D, Doppler, and Color Doppler    Study date:  2016    Patient: Buddy James  MR number: [id number]  Account number: [de-identified]  : 1955  Age: 61 years  Gender: Female  Status: Inpatient  Location: Echo lab  Height: 69 in  Weight: 213 lb  BP: 131/ 69 mmHg    Indications: CVA    Diagnoses: 56 - CVA    Sonographer:  Brown Hebert RN, RCS  Primary Physician:  Carissa Silva DO  Group:  [facility]  Cardiology Fellow:  Ananda House MD  Interpreting Physician:  Kimberli Horn MD    SUMMARY    LEFT VENTRICLE:  Systolic function was normal  Ejection fraction was estimated to be 65 %  There were no regional wall motion abnormalities  LEFT ATRIUM:  The atrium was mildly dilated      ATRIAL SEPTUM:  There was redundancy of the septum, with borderline criteria for aneurysm  There was a patent foramen ovale  A bubble study was performed  There was a right-to-left shunt, in the baseline  state  MITRAL VALVE:  There was trace regurgitation  PULMONIC VALVE:  There was mild regurgitation  HISTORY: PRIOR HISTORY: Hypertension    PROCEDURE: The procedure was performed in the echo lab  This was a routine  study  The risks and alternatives of the procedure were explained to the  patient's next of kin and informed consent was obtained  The transesophageal  approach was used  The study included complete 2D imaging, complete spectral  Doppler, and color Doppler  The heart rate was 82 bpm, at the start of the  study  An adult multiplane probe was inserted by the cardiology fellow under  direct supervision of the attending cardiologist  Intubated with ease  One  intubation attempt(s)  There was no blood detected on the probe  Image quality  was good  MEDICATIONS: Propofol, 180 mg  Anesthesia administered by anesthesia  team  Lidocaine, 60mg  Ketamine, 20  LEFT VENTRICLE: Size was normal  Systolic function was normal  Ejection  fraction was estimated to be 65 %  There were no regional wall motion  abnormalities  Wall thickness was normal     RIGHT VENTRICLE: The size was normal  Systolic function was normal  Wall  thickness was normal     LEFT ATRIUM: The atrium was mildly dilated  ATRIAL SEPTUM: There was redundancy of the septum, with borderline criteria for  aneurysm  There was a patent foramen ovale  A bubble study was performed  There  was a right-to-left shunt, in the baseline state  RIGHT ATRIUM: Size was normal  A chiari network is noted  No thrombus was  identified  MITRAL VALVE: Valve structure was normal  There was normal leaflet separation  There was no echocardiographic evidence of vegetation  DOPPLER: There was trace  regurgitation  AORTIC VALVE: The valve was trileaflet  Leaflets exhibited normal thickness and  normal cuspal separation   There was no echocardiographic evidence of  vegetation  DOPPLER: There was no regurgitation  TRICUSPID VALVE: The valve structure was normal  There was normal leaflet  separation  There was no echocardiographic evidence of vegetation  DOPPLER:  There was no regurgitation  PULMONIC VALVE: Leaflets exhibited normal thickness, no calcification, and  normal cuspal separation  There was no echocardiographic evidence of  vegetation  DOPPLER: There was mild regurgitation  PERICARDIUM: There was no pericardial effusion  AORTA: The root exhibited normal size  There was no atheroma  There was no  evidence for dissection  There was no evidence for aneurysm  [address] Accredited Echocardiography Laboratory    Prepared and electronically signed by    Teodora Dubin, MD  Signed 01-Jun-2016 16:39:27       No results found for this or any previous visit  No results found for this or any previous visit      Meds/Allergies   all current active meds have been reviewed  Medications Prior to Admission   Medication    atorvastatin (LIPITOR) 40 mg tablet    cholecalciferol (VITAMIN D3) 1,000 units tablet    escitalopram (LEXAPRO) 10 mg tablet    ibuprofen (MOTRIN) 600 mg tablet    potassium chloride (K-DUR,KLOR-CON) 20 mEq tablet          Assessment:  Principal Problem:    Chest pain at rest  Active Problems:    Hypertension    Expressive aphasia    Spastic hemiplegia of right dominant side as late effect of cerebral infarction (HCC)    Hypotension    Epigastric pain

## 2019-02-27 ENCOUNTER — APPOINTMENT (INPATIENT)
Dept: NUCLEAR MEDICINE | Facility: HOSPITAL | Age: 64
DRG: 313 | End: 2019-02-27
Payer: COMMERCIAL

## 2019-02-27 VITALS
TEMPERATURE: 98.6 F | WEIGHT: 158.73 LBS | HEIGHT: 64 IN | OXYGEN SATURATION: 96 % | DIASTOLIC BLOOD PRESSURE: 68 MMHG | SYSTOLIC BLOOD PRESSURE: 127 MMHG | BODY MASS INDEX: 27.1 KG/M2 | RESPIRATION RATE: 19 BRPM | HEART RATE: 60 BPM

## 2019-02-27 LAB
ANION GAP SERPL CALCULATED.3IONS-SCNC: 4 MMOL/L (ref 4–13)
BASOPHILS # BLD AUTO: 0.03 THOUSANDS/ΜL (ref 0–0.1)
BASOPHILS NFR BLD AUTO: 1 % (ref 0–1)
BUN SERPL-MCNC: 13 MG/DL (ref 5–25)
CALCIUM SERPL-MCNC: 8.4 MG/DL (ref 8.3–10.1)
CHEST PAIN STATEMENT: NORMAL
CHLORIDE SERPL-SCNC: 110 MMOL/L (ref 100–108)
CO2 SERPL-SCNC: 28 MMOL/L (ref 21–32)
CREAT SERPL-MCNC: 0.74 MG/DL (ref 0.6–1.3)
EOSINOPHIL # BLD AUTO: 0.13 THOUSAND/ΜL (ref 0–0.61)
EOSINOPHIL NFR BLD AUTO: 2 % (ref 0–6)
ERYTHROCYTE [DISTWIDTH] IN BLOOD BY AUTOMATED COUNT: 12.7 % (ref 11.6–15.1)
GFR SERPL CREATININE-BSD FRML MDRD: 86 ML/MIN/1.73SQ M
GLUCOSE SERPL-MCNC: 85 MG/DL (ref 65–140)
HCT VFR BLD AUTO: 40 % (ref 34.8–46.1)
HGB BLD-MCNC: 12.8 G/DL (ref 11.5–15.4)
IMM GRANULOCYTES # BLD AUTO: 0 THOUSAND/UL (ref 0–0.2)
IMM GRANULOCYTES NFR BLD AUTO: 0 % (ref 0–2)
LYMPHOCYTES # BLD AUTO: 3.01 THOUSANDS/ΜL (ref 0.6–4.47)
LYMPHOCYTES NFR BLD AUTO: 53 % (ref 14–44)
MAX DIASTOLIC BP: 67 MMHG
MAX HEART RATE: 118 BPM
MAX PREDICTED HEART RATE: 157 BPM
MAX. SYSTOLIC BP: 118 MMHG
MCH RBC QN AUTO: 30.9 PG (ref 26.8–34.3)
MCHC RBC AUTO-ENTMCNC: 32 G/DL (ref 31.4–37.4)
MCV RBC AUTO: 97 FL (ref 82–98)
MONOCYTES # BLD AUTO: 0.41 THOUSAND/ΜL (ref 0.17–1.22)
MONOCYTES NFR BLD AUTO: 7 % (ref 4–12)
NEUTROPHILS # BLD AUTO: 2.13 THOUSANDS/ΜL (ref 1.85–7.62)
NEUTS SEG NFR BLD AUTO: 37 % (ref 43–75)
NRBC BLD AUTO-RTO: 0 /100 WBCS
PLATELET # BLD AUTO: 260 THOUSANDS/UL (ref 149–390)
PMV BLD AUTO: 9.7 FL (ref 8.9–12.7)
POTASSIUM SERPL-SCNC: 3.8 MMOL/L (ref 3.5–5.3)
PROTOCOL NAME: NORMAL
RBC # BLD AUTO: 4.14 MILLION/UL (ref 3.81–5.12)
REASON FOR TERMINATION: NORMAL
SODIUM SERPL-SCNC: 142 MMOL/L (ref 136–145)
TARGET HR FORMULA: NORMAL
TEST INDICATION: NORMAL
TIME IN EXERCISE PHASE: NORMAL
WBC # BLD AUTO: 5.71 THOUSAND/UL (ref 4.31–10.16)

## 2019-02-27 PROCEDURE — 93017 CV STRESS TEST TRACING ONLY: CPT

## 2019-02-27 PROCEDURE — 99238 HOSP IP/OBS DSCHRG MGMT 30/<: CPT | Performed by: PHYSICIAN ASSISTANT

## 2019-02-27 PROCEDURE — 78452 HT MUSCLE IMAGE SPECT MULT: CPT | Performed by: INTERNAL MEDICINE

## 2019-02-27 PROCEDURE — 85025 COMPLETE CBC W/AUTO DIFF WBC: CPT | Performed by: PHYSICIAN ASSISTANT

## 2019-02-27 PROCEDURE — 78452 HT MUSCLE IMAGE SPECT MULT: CPT

## 2019-02-27 PROCEDURE — 93016 CV STRESS TEST SUPVJ ONLY: CPT | Performed by: INTERNAL MEDICINE

## 2019-02-27 PROCEDURE — 80048 BASIC METABOLIC PNL TOTAL CA: CPT | Performed by: PHYSICIAN ASSISTANT

## 2019-02-27 PROCEDURE — A9502 TC99M TETROFOSMIN: HCPCS

## 2019-02-27 PROCEDURE — 93018 CV STRESS TEST I&R ONLY: CPT | Performed by: INTERNAL MEDICINE

## 2019-02-27 RX ADMIN — ESCITALOPRAM OXALATE 10 MG: 10 TABLET ORAL at 08:37

## 2019-02-27 RX ADMIN — SODIUM CHLORIDE 100 ML/HR: 0.9 INJECTION, SOLUTION INTRAVENOUS at 03:57

## 2019-02-27 RX ADMIN — VITAMIN D, TAB 1000IU (100/BT) 1000 UNITS: 25 TAB at 08:37

## 2019-02-27 RX ADMIN — ASPIRIN 324 MG: 81 TABLET, COATED ORAL at 08:37

## 2019-02-27 RX ADMIN — REGADENOSON 0.4 MG: 0.08 INJECTION, SOLUTION INTRAVENOUS at 14:45

## 2019-02-27 RX ADMIN — ATORVASTATIN CALCIUM 40 MG: 40 TABLET, FILM COATED ORAL at 08:37

## 2019-02-27 RX ADMIN — PANTOPRAZOLE SODIUM 40 MG: 40 TABLET, DELAYED RELEASE ORAL at 06:16

## 2019-02-27 RX ADMIN — ENOXAPARIN SODIUM 40 MG: 40 INJECTION SUBCUTANEOUS at 08:36

## 2019-02-27 NOTE — ASSESSMENT & PLAN NOTE
Chest pain/epigastric pain  CT abdomen pelvis: No acute intra-abdominal abnormality  Stress test: IMPRESSIONS: Normal study after pharmacologic vasodilation without reproduction of symptoms  Myocardial perfusion imaging was normal at rest and with stress  Patient stated her pain had resolved  I encouraged the patient to follow up with GI if her symptoms return

## 2019-02-27 NOTE — PLAN OF CARE
Problem: Prexisting or High Potential for Compromised Skin Integrity  Goal: Skin integrity is maintained or improved  Description  INTERVENTIONS:  - Identify patients at risk for skin breakdown  - Assess and monitor skin integrity  - Assess and monitor nutrition and hydration status  - Monitor labs (i e  albumin)  - Assess for incontinence   - Turn and reposition patient  - Assist with mobility/ambulation  - Relieve pressure over bony prominences  - Avoid friction and shearing  - Provide appropriate hygiene as needed including keeping skin clean and dry  - Evaluate need for skin moisturizer/barrier cream  - Collaborate with interdisciplinary team (i e  Nutrition, Rehabilitation, etc )   - Patient/family teaching  Outcome: Progressing     Problem: PAIN - ADULT  Goal: Verbalizes/displays adequate comfort level or baseline comfort level  Description  Interventions:  - Encourage patient to monitor pain and request assistance  - Assess pain using appropriate pain scale  - Administer analgesics based on type and severity of pain and evaluate response  - Implement non-pharmacological measures as appropriate and evaluate response  - Consider cultural and social influences on pain and pain management  - Notify physician/advanced practitioner if interventions unsuccessful or patient reports new pain  Outcome: Progressing     Problem: INFECTION - ADULT  Goal: Absence or prevention of progression during hospitalization  Description  INTERVENTIONS:  - Assess and monitor for signs and symptoms of infection  - Monitor lab/diagnostic results  - Monitor all insertion sites, i e  indwelling lines, tubes, and drains  - Monitor endotracheal (as able) and nasal secretions for changes in amount and color  - [place] appropriate cooling/warming therapies per order  - Administer medications as ordered  - Instruct and encourage patient and family to use good hand hygiene technique  - Identify and instruct in appropriate isolation precautions for identified infection/condition  Outcome: Progressing  Goal: Absence of fever/infection during neutropenic period  Description  INTERVENTIONS:  - Monitor WBC   Outcome: Progressing     Problem: SAFETY ADULT  Goal: Patient will remain free of falls  Description  INTERVENTIONS:  - Assess patient frequently for physical needs  -  Identify cognitive and physical deficits and behaviors that affect risk of falls    -  [place] fall precautions as indicated by assessment   - Educate patient/family on patient safety including physical limitations  - Instruct patient to call for assistance with activity based on assessment  - Modify environment to reduce risk of injury  - Consider OT/PT consult to assist with strengthening/mobility  Outcome: Progressing  Goal: Maintain or return to baseline ADL function  Description  INTERVENTIONS:  -  Assess patient's ability to carry out ADLs; assess patient's baseline for ADL function and identify physical deficits which impact ability to perform ADLs (bathing, care of mouth/teeth, toileting, grooming, dressing, etc )  - Assess/evaluate cause of self-care deficits   - Assess range of motion  - Assess patient's mobility; develop plan if impaired  - Assess patient's need for assistive devices and provide as appropriate  - Encourage maximum independence but intervene and supervise when necessary  ¯ Involve family in performance of ADLs  ¯ Assess for home care needs following discharge   ¯ Request OT consult to assist with ADL evaluation and planning for discharge  ¯ Provide patient education as appropriate  Outcome: Progressing  Goal: Maintain or return mobility status to optimal level  Description  INTERVENTIONS:  - Assess patient's baseline mobility status (ambulation, transfers, stairs, etc )    - Identify cognitive and physical deficits and behaviors that affect mobility  - Identify mobility aids required to assist with transfers and/or ambulation (gait belt, sit-to-stand, lift, walker, cane, etc )  - [place] fall precautions as indicated by assessment  - Record patient progress and toleration of activity level on Mobility SBAR; progress patient to next Phase/Stage  - Instruct patient to call for assistance with activity based on assessment  - Request Rehabilitation consult to assist with strengthening/weightbearing, etc   Outcome: Progressing     Problem: DISCHARGE PLANNING  Goal: Discharge to home or other facility with appropriate resources  Description  INTERVENTIONS:  - Identify barriers to discharge w/patient and caregiver  - Arrange for needed discharge resources and transportation as appropriate  - Identify discharge learning needs (meds, wound care, etc )    - Refer to Case Management Department for coordinating discharge planning if the patient needs post-hospital services based on physician/advanced practitioner order or complex needs related to functional status, cognitive ability, or social support system   Outcome: Progressing     Problem: Knowledge Deficit  Goal: Patient/family/caregiver demonstrates understanding of disease process, treatment plan, medications, and discharge instructions  Description  Complete learning assessment and assess knowledge base    Interventions:  - Provide teaching at level of understanding  - Provide teaching via preferred learning methods  Outcome: Progressing     Problem: DISCHARGE PLANNING - CARE MANAGEMENT  Goal: Discharge to post-acute care or home with appropriate resources  Description  INTERVENTIONS:  - Conduct assessment to determine patient/family and health care team treatment goals, and need for post-acute services based on payer coverage, community resources, and patient preferences, and barriers to discharge  - Address psychosocial, clinical, and financial barriers to discharge as identified in assessment in conjunction with the patient/family and health care team  - Arrange appropriate level of post-acute services according to patient's   needs and preference and payer coverage in collaboration with the physician and health care team  - Communicate with and update the patient/family, physician, and health care team regarding progress on the discharge plan  - Arrange appropriate transportation to post-acute venues  -home on dc with family support and Outpatient follow up   Outcome: Progressing     Problem: Potential for Falls  Goal: Patient will remain free of falls  Description  INTERVENTIONS:  - Assess patient frequently for physical needs  -  Identify cognitive and physical deficits and behaviors that affect risk of falls    -  [place] fall precautions as indicated by assessment   - Educate patient/family on patient safety including physical limitations  - Instruct patient to call for assistance with activity based on assessment  - Modify environment to reduce risk of injury  - Consider OT/PT consult to assist with strengthening/mobility  Outcome: Progressing

## 2019-02-27 NOTE — UTILIZATION REVIEW
OBSERVATION WRITTEN 02/23/19 @ 1303 CONVERTED TO INPATIENT ADMISSION 02/26/19 @ 1422 DUE TO NEED FOR IVF DUE TO PERSISTENT HYPOTENSION, REQUIRING AT LEAST A 2 MIDNIGHT STAY  Admitting Physician Brad Freed    Level of Care Med Surg    Bed request comments telemetry    Estimated length of stay More than 2 Midnights    Certification I certify that inpatient services are medically necessary for this patient for a duration of greater than two midnights  See H&P and MD Progress Notes for additional information about the patient's course of treatment  Date/Time  BP Patient Position - Orthostatic VS   02/27/19 0841  90/78 Sitting   02/27/19 0738  96/56 Lying   02/27/19 0017  82/52Abnormal  Lying   02/27/19 0011  83/53Abnormal  Lying   02/26/19 1525  91/52 Lying   02/26/19 1017  96/50 Lying   02/26/19 1016  96/53 Lying   02/26/19 0746  96/56 Lying   02/25/19 2345  88/53Abnormal  Lying   02/25/19 1545  104/64 Sitting   02/25/19 1154  96/52 Lying   02/25/19 1150  82/52Abnormal  Lying   02/25/19 0905  92/50  Sitting    02/25/19 0754  94/52 Sitting   02/25/19 0500  84/58Abnormal  Lying   02/25/19 0300  89/52Abnormal  Lying     Chest pain at rest  Assessment & Plan  Left midsternal chest pain-no radiation  Troponin negative x 3   Echo:   LEFT VENTRICLE:  Systolic function was normal  Ejection fraction was estimated to be 60 %  There were no regional wall motion abnormalities  Doppler parameters were consistent with abnormal left ventricular relaxation (grade 1 diastolic dysfunction)      RIGHT VENTRICLE:  The ventricle was mildly dilated  Systolic function was normal      MITRAL VALVE:  There was trace regurgitation      TRICUSPID VALVE:  There was trace regurgitation  Nuclear med stress test ordered however this has been postponed due to hypotension  Provide supportive care  Hypotension  Assessment & Plan  The patient has been hypotensive with blood pressure of 77/39 on 2/24/19    The patient was given IV bolus and started on IV fluids  She continues to remain hypotensive at 96/50  Given her hypotension the stress test has been postponed  Will continue IV fluids  Continue to monitor  Epigastric pain  Assessment & Plan  Chest pain/epigastric pain  CT abdomen pelvis: No acute intra-abdominal abnormality  Cardiology following  Stress test ordered but currently on hold due to hypotension  Spastic hemiplegia of right dominant side as late effect of cerebral infarction [facility])  Assessment & Plan  - Continue Lipitor and aspirin  Expressive aphasia  Assessment & Plan  Patient does have chronic baseline deficits due to prior stroke including right -sided weakness +1 Upper and Lower EXT  Aphasia   VTE Pharmacologic Prophylaxis:   Pharmacologic: Enoxaparin (Lovenox)  Mechanical VTE Prophylaxis in Place: Yes  Current Patient Status: Inpatient   Certification Statement: The patient, admitted on an observation basis, will now require > 2 midnight hospital stay due to need for IV fluids due to persisent hypotension  Discharge Plan: TBD     Network Utilization Review Department  Phone: [phone]; Fax [phone]  [email or web address]  ATTENTION: Please call with any questions or concerns to [phone]  and carefully listen to the prompts so that you are directed to the right person  Send all requests for admission clinical reviews, approved or denied determinations and any other requests to fax [phone]   All voicemails are confidential

## 2019-02-27 NOTE — ASSESSMENT & PLAN NOTE
The patient has been hypotensive with blood pressure of 77/39 on 2/24/19  The patient was given IV bolus and started on IV fluids  The patient's blood pressure improved by remain low normal in the 90's  Given her hypotension the patient's stress test was postponed on 2/25/19 & 2/26/19  The patient was continued on IV fluids and her blood pressure continued to remain in the 90's  Cardiology cleared the patient for her stress test on 2/27/19  Blood pressure prior to discharge was noted to be 127/68  Outpatient follow up with PCP

## 2019-02-27 NOTE — DISCHARGE SUMMARY
Discharge- Mearl Kill 1955, 61 y o  female MRN: [id number]    Unit/Bed#: 294-45 Encounter: [id number]    Primary Care Provider: Shanique Magdaleno DO   Date and time admitted to hospital: 2/23/2019 11:57 AM        * Chest pain at rest  Assessment & Plan  Left midsternal chest pain-no radiation  Troponin negative x 3   Echo:   LEFT VENTRICLE:  Systolic function was normal  Ejection fraction was estimated to be 60 %  There were no regional wall motion abnormalities  Doppler parameters were consistent with abnormal left ventricular relaxation (grade 1 diastolic dysfunction)      RIGHT VENTRICLE:  The ventricle was mildly dilated  Systolic function was normal      MITRAL VALVE:  There was trace regurgitation      TRICUSPID VALVE:  There was trace regurgitation  Nuclear med stress:  IMPRESSIONS: Normal study after pharmacologic vasodilation without reproduction of symptoms  Myocardial perfusion imaging was normal at rest and with stress  Outpatient follow up with PCP  Hypotension  Assessment & Plan  The patient has been hypotensive with blood pressure of 77/39 on 2/24/19  The patient was given IV bolus and started on IV fluids  The patient's blood pressure improved by remain low normal in the 90's  Given her hypotension the patient's stress test was postponed on 2/25/19 & 2/26/19  The patient was continued on IV fluids and her blood pressure continued to remain in the 90's  Cardiology cleared the patient for her stress test on 2/27/19  Blood pressure prior to discharge was noted to be 127/68  Outpatient follow up with PCP  Epigastric pain  Assessment & Plan  Chest pain/epigastric pain  CT abdomen pelvis: No acute intra-abdominal abnormality  Stress test: IMPRESSIONS: Normal study after pharmacologic vasodilation without reproduction of symptoms  Myocardial perfusion imaging was normal at rest and with stress  Patient stated her pain had resolved     I encouraged the patient to follow up with GI if her symptoms return  Spastic hemiplegia of right dominant side as late effect of cerebral infarction [facility])  Assessment & Plan  - Continue Lipitor and aspirin  Expressive aphasia  Assessment & Plan  Patient does have chronic baseline deficits due to prior stroke including right -sided weakness +1 Upper and Lower EXT  Aphasia         Discharging Physician / Practitioner: Snehal Barraza PA-C  PCP: Fernando Jay DO  Admission Date:   Admission Orders (From admission, onward)    Ordered        02/26/19 1421  Inpatient Admission  Once     Order ID Start Status   [id number] 02/26/19 1422 Completed          02/23/19 1302  Place in Observation  Once     Order ID Start Status   [id number] 02/23/19 1303 Completed              Discharge Date: 02/27/19    Resolved Problems  Date Reviewed: 2/27/2019    None          Consultations During Hospital Stay:  · Cardiology    Procedures Performed:   · none    Significant Findings / Test Results:   · Nuclear stress test: IMPRESSIONS: Normal study after pharmacologic vasodilation without reproduction of symptoms  Myocardial perfusion imaging was normal at rest and with stress  · Echocardiogram:  LEFT VENTRICLE:  Systolic function was normal  Ejection fraction was estimated to be 60 %  There were no regional wall motion abnormalities  Doppler parameters were consistent with abnormal left ventricular relaxation (grade 1 diastolic dysfunction)      RIGHT VENTRICLE:  The ventricle was mildly dilated  Systolic function was normal      MITRAL VALVE:  There was trace regurgitation      TRICUSPID VALVE:  There was trace regurgitation  · CT abdomen pelvis: No acute intra-abdominal abnormality    Incidental Findings:   · none     Test Results Pending at Discharge (will require follow up):   · none     Outpatient Tests Requested:  · none    Complications:  none    Reason for Admission: chest pain    Hospital Course:     Rafiq Rogers is a 61 y o  female patient who originally presented to the hospital on 2/23/2019 due to chest pain at rest    Images and labs were collected in ER- See below  During my exam patient admits to left sternal chest pain without radiation  With palpation on the left sternal border patient winced, patient denies any lightheadedness dizziness, denies vomiting admits to 1 episode of nausea denies blurred vision  Patient denies constipation or diarrhea, hematochezia or melena dysuria urgency or frequency  Patient is status post CVA resulting in right-sided deficits including trace movement on right upper and lower extremity, decreased sensation on right upper and lower extremity, aphasia  Patient is alert and oriented answers appropriately with yes and no questions  Please see above list of diagnoses and related plan for additional information  Condition at Discharge: good     Discharge Day Visit / Exam:     Subjective:    Vitals: Blood Pressure: 127/68 (02/27/19 1622)  Pulse: 60 (02/27/19 1622)  Temperature: 98 6 °F (37 °C) (02/27/19 1622)  Temp Source: Temporal (02/27/19 1622)  Respirations: 19 (02/27/19 1622)  Height: 5' 4" (162 6 cm) (02/23/19 1613)  Weight - Scale: 72 kg (158 lb 11 7 oz) (02/23/19 1613)  SpO2: 96 % (02/27/19 1622)  Exam:   Physical Exam   Constitutional: She appears well-developed and well-nourished  HENT:   Head: Normocephalic and atraumatic  Cardiovascular: Normal rate and regular rhythm  Pulmonary/Chest: Effort normal and breath sounds normal  No respiratory distress  She has no wheezes  She has no rales  Abdominal: Soft  Bowel sounds are normal  She exhibits no distension  There is no tenderness  Neurological: She is alert  Skin: Skin is warm and dry  Nursing note and vitals reviewed  Discussion with Family: mother updated at bedside prior to discharge  Discharge instructions/Information to patient and family:   See after visit summary for information provided to patient and family        Provisions for Follow-Up Care:  See after visit summary for information related to follow-up care and any pertinent home health orders  Disposition:     Home    For Discharges to [facility] SNF:   · Not Applicable to this Patient - Not Applicable to this Patient    Planned Readmission: no     Discharge Statement:  I spent 25 minutes discharging the patient  This time was spent on the day of discharge  I had direct contact with the patient on the day of discharge  Greater than 50% of the total time was spent examining patient, answering all patient questions, arranging and discussing plan of care with patient as well as directly providing post-discharge instructions  Additional time then spent on discharge activities  Discharge Medications:  See after visit summary for reconciled discharge medications provided to patient and family        ** Please Note: This note has been constructed using a voice recognition system **

## 2019-02-27 NOTE — ASSESSMENT & PLAN NOTE
Left midsternal chest pain-no radiation  Troponin negative x 3   Echo:   LEFT VENTRICLE:  Systolic function was normal  Ejection fraction was estimated to be 60 %  There were no regional wall motion abnormalities  Doppler parameters were consistent with abnormal left ventricular relaxation (grade 1 diastolic dysfunction)      RIGHT VENTRICLE:  The ventricle was mildly dilated  Systolic function was normal      MITRAL VALVE:  There was trace regurgitation      TRICUSPID VALVE:  There was trace regurgitation  Nuclear med stress:  IMPRESSIONS: Normal study after pharmacologic vasodilation without reproduction of symptoms  Myocardial perfusion imaging was normal at rest and with stress  Outpatient follow up with PCP

## 2019-02-28 NOTE — UTILIZATION REVIEW
Notification of Discharge  This is a Notification of Discharge from our facility [address]  Please be advised that this patient has been discharge from our facility  Below you will find the admission and discharge date and time including the patients disposition  PRESENTATION DATE: 2/23/2019 11:57 AM  IP ADMISSION DATE: 2/26/19 1421  DISCHARGE DATE: 2/27/2019  5:30 PM  DISPOSITION: [id number] Utilization Review Department  Phone: [phone]; Fax [phone]  [email or web address]  ATTENTION: Please call with any questions or concerns to [phone]  and carefully listen to the prompts so that you are directed to the right person  Send all requests for admission clinical reviews, approved or denied determinations and any other requests to fax [phone]   All voicemails are confidential

## 2019-03-01 NOTE — UTILIZATION REVIEW
[address] [place] [address] in the [facility] by Segundo Meyer for 2017  Network Utilization Review Department  Phone: [phone]; Fax [phone]  ATTENTION: The Network Utilization Review Department is now centralized for our 7 Facilities  Please call with any questions or concerns to [phone] and carefully follow the prompts so that you are directed to the right person  All voicemails are confidential  Fax any determinations, approvals, denials, and requests for initial or continue stay review clinical to [phone]  Due to HIGH CALL volume, it would be easier if you could please send faxed requests to expedite your requests and in part, help us provide discharge notifications faster   /////////////////////////////////////////////////////////////////////////////////////////////////////////////////      ATTENTION:    DR SULTANA      ADDITIONAL INFORMATION RE IV THERAPY during IP stay    2/23  @  1244  -  continuous IVF initiated at 125 cc/hr        dced on  2/24  @  1008       2/25  @  0300  bp  89/52  2/25  @  0500  bp 84/58  2/25  @  0900  bp  92/50     2/25  nsg note:    Patient's BP 92/50 manually this morning  Patient asymptomatic  Elle Aguirre MD notified  Order for NS bolus placed (Initiated @  089 60 25 65)  and infusing at this time    Lexiscan stress test on hold at this time due to low BP       2/25  @  0755  IVF  cc bolus given     2/25  @  2212  -  IVF LR  initiated @ 100 cc/hr     Continued thru 2/26 when                                                                                                   (2/26)  @  0755 ADDITIONAL  500 cc bolus IV NS given ,  followed by continuous IVF NS @ 100 cc/hr           Please advise if any more clinical information is needed    Thanks,  Mildred Tracey  RN, BSN

## 2019-03-02 LAB
BACTERIA BLD CULT: NORMAL
BACTERIA BLD CULT: NORMAL

## 2019-07-15 ENCOUNTER — APPOINTMENT (OUTPATIENT)
Dept: LAB | Facility: HOSPITAL | Age: 64
End: 2019-07-15
Payer: COMMERCIAL

## 2019-07-15 ENCOUNTER — TRANSCRIBE ORDERS (OUTPATIENT)
Dept: ADMINISTRATIVE | Facility: HOSPITAL | Age: 64
End: 2019-07-15

## 2019-07-15 DIAGNOSIS — E55.9 VITAMIN D DEFICIENCY: ICD-10-CM

## 2019-07-15 DIAGNOSIS — Z13.9 SCREENING FOR CONDITION: Primary | ICD-10-CM

## 2019-07-15 DIAGNOSIS — Z13.9 SCREENING FOR CONDITION: ICD-10-CM

## 2019-07-15 DIAGNOSIS — I10 ESSENTIAL HYPERTENSION, MALIGNANT: ICD-10-CM

## 2019-07-15 LAB
25(OH)D3 SERPL-MCNC: 36.7 NG/ML (ref 30–100)
ALBUMIN SERPL BCP-MCNC: 3.4 G/DL (ref 3.5–5)
ALP SERPL-CCNC: 165 U/L (ref 46–116)
ALT SERPL W P-5'-P-CCNC: 35 U/L (ref 12–78)
ANION GAP SERPL CALCULATED.3IONS-SCNC: 6 MMOL/L (ref 4–13)
AST SERPL W P-5'-P-CCNC: 26 U/L (ref 5–45)
BASOPHILS # BLD AUTO: 0.03 THOUSANDS/ΜL (ref 0–0.1)
BASOPHILS NFR BLD AUTO: 1 % (ref 0–1)
BILIRUB SERPL-MCNC: 0.5 MG/DL (ref 0.2–1)
BUN SERPL-MCNC: 17 MG/DL (ref 5–25)
CALCIUM SERPL-MCNC: 9 MG/DL (ref 8.3–10.1)
CHLORIDE SERPL-SCNC: 105 MMOL/L (ref 100–108)
CHOLEST SERPL-MCNC: 117 MG/DL (ref 50–200)
CO2 SERPL-SCNC: 31 MMOL/L (ref 21–32)
CREAT SERPL-MCNC: 0.72 MG/DL (ref 0.6–1.3)
EOSINOPHIL # BLD AUTO: 0.07 THOUSAND/ΜL (ref 0–0.61)
EOSINOPHIL NFR BLD AUTO: 1 % (ref 0–6)
ERYTHROCYTE [DISTWIDTH] IN BLOOD BY AUTOMATED COUNT: 12.9 % (ref 11.6–15.1)
GFR SERPL CREATININE-BSD FRML MDRD: 89 ML/MIN/1.73SQ M
GLUCOSE P FAST SERPL-MCNC: 88 MG/DL (ref 65–99)
HCT VFR BLD AUTO: 45.8 % (ref 34.8–46.1)
HGB BLD-MCNC: 14.5 G/DL (ref 11.5–15.4)
IMM GRANULOCYTES # BLD AUTO: 0.01 THOUSAND/UL (ref 0–0.2)
IMM GRANULOCYTES NFR BLD AUTO: 0 % (ref 0–2)
LDLC SERPL DIRECT ASSAY-MCNC: 43 MG/DL (ref 0–100)
LYMPHOCYTES # BLD AUTO: 2.59 THOUSANDS/ΜL (ref 0.6–4.47)
LYMPHOCYTES NFR BLD AUTO: 41 % (ref 14–44)
MCH RBC QN AUTO: 30.9 PG (ref 26.8–34.3)
MCHC RBC AUTO-ENTMCNC: 31.7 G/DL (ref 31.4–37.4)
MCV RBC AUTO: 97 FL (ref 82–98)
MONOCYTES # BLD AUTO: 0.46 THOUSAND/ΜL (ref 0.17–1.22)
MONOCYTES NFR BLD AUTO: 7 % (ref 4–12)
NEUTROPHILS # BLD AUTO: 3.17 THOUSANDS/ΜL (ref 1.85–7.62)
NEUTS SEG NFR BLD AUTO: 50 % (ref 43–75)
NRBC BLD AUTO-RTO: 0 /100 WBCS
PLATELET # BLD AUTO: 298 THOUSANDS/UL (ref 149–390)
PMV BLD AUTO: 9.6 FL (ref 8.9–12.7)
POTASSIUM SERPL-SCNC: 4.2 MMOL/L (ref 3.5–5.3)
PROT SERPL-MCNC: 7 G/DL (ref 6.4–8.2)
RBC # BLD AUTO: 4.7 MILLION/UL (ref 3.81–5.12)
SODIUM SERPL-SCNC: 142 MMOL/L (ref 136–145)
TSH SERPL DL<=0.05 MIU/L-ACNC: 3.53 UIU/ML (ref 0.36–3.74)
WBC # BLD AUTO: 6.33 THOUSAND/UL (ref 4.31–10.16)

## 2019-07-15 PROCEDURE — 84443 ASSAY THYROID STIM HORMONE: CPT

## 2019-07-15 PROCEDURE — 36415 COLL VENOUS BLD VENIPUNCTURE: CPT

## 2019-07-15 PROCEDURE — 85025 COMPLETE CBC W/AUTO DIFF WBC: CPT

## 2019-07-15 PROCEDURE — 80053 COMPREHEN METABOLIC PANEL: CPT

## 2019-07-15 PROCEDURE — 82465 ASSAY BLD/SERUM CHOLESTEROL: CPT

## 2019-07-15 PROCEDURE — 82306 VITAMIN D 25 HYDROXY: CPT

## 2019-07-15 PROCEDURE — 83721 ASSAY OF BLOOD LIPOPROTEIN: CPT

## 2020-03-04 NOTE — PROGRESS NOTES
Daily Note     Today's date: 2018  Patient name: Marcus Keys  : 1955  MRN: [id number]  Referring provider: Nathalie Whaley DO  Dx:   Encounter Diagnosis     ICD-10-CM    1  Cerebellar infarction [facility]) I63 9      Precautions:  MAFO right LE  Falls  Re-eval Date:18    Date 4/10/18 4/12/18 4/17/18     Visit Count 38      Pain In 0/10 0/10 0     Pain Out 0/10 0/10 0     Time In/Out 10:00-11:00 10-11 10-11 a m  Subjective: Caregiver requests options for continued care and wellness program   Provided with 3 options and she will inquire further  Objective: See treatment diary below      Assessment: Tolerated treatment well  Patient demonstrated fatigue post treatment and would benefit from continued PT  Patient continues to require cues for safety  Demonstrating improved tolerance to functional activity/standing  Caregiver present and able to observe this date  Plan: Continue per plan of care  Manual  4/10/18       Hamstring Stretch prn prn prn     Quad stretch prn       Iliacus Stretch prn                           Exercise Diary         Seated LE TE        Hip ABduction  2x10 Red TB      ADductions   2x10      hamstring curl  11# machine  AAROM   2x10 reps      DF/PF   Left only  2x10 2#      Hip flexion  Left only  2x10 2#      LAQ  11# machine  AAROM 10 reps      Nu-Step 15 mins  L3 15 mins L2               Left UE TE        Biceps        Triceps        IR        ER        MTP/LTP        ABduction                Transfer Training                Dynamic Balance 10 mins  30 mins             Ambulation 30 mins 15 mins 30 mins       Dynamic Balance: Standing for functional activity to include washing/putting dishes away  Laundry washing, retrieving from dryer  Folding clothing  Standing and side stepping at counter  Ambulation:  Ambulates with SBQC for 60 feet x 4 with close supervision    More fatigued, required min A to recover from LOB this date x 1 May 2020 recall letter mailed to pt.  Carotid duplex order in chart.   episode  Negotiation of stairs with ability to move LE over lip of step x 25%  Struggle with lip of edge of step on practice steps

## 2020-06-12 ENCOUNTER — OFFICE VISIT (OUTPATIENT)
Dept: FAMILY MEDICINE CLINIC | Facility: CLINIC | Age: 65
End: 2020-06-12
Payer: COMMERCIAL

## 2020-06-12 VITALS — DIASTOLIC BLOOD PRESSURE: 68 MMHG | HEART RATE: 78 BPM | SYSTOLIC BLOOD PRESSURE: 124 MMHG | RESPIRATION RATE: 20 BRPM

## 2020-06-12 DIAGNOSIS — E78.2 MIXED HYPERLIPIDEMIA: Primary | ICD-10-CM

## 2020-06-12 DIAGNOSIS — I69.351 SPASTIC HEMIPLEGIA OF RIGHT DOMINANT SIDE AS LATE EFFECT OF CEREBRAL INFARCTION (HCC): ICD-10-CM

## 2020-06-12 DIAGNOSIS — I11.0 HYPERTENSIVE HEART DISEASE WITH CONGESTIVE HEART FAILURE, UNSPECIFIED HEART FAILURE TYPE (HCC): ICD-10-CM

## 2020-06-12 DIAGNOSIS — I69.398 DEPRESSION AS LATE EFFECT OF CEREBROVASCULAR ACCIDENT (CVA): ICD-10-CM

## 2020-06-12 DIAGNOSIS — F06.31 DEPRESSION AS LATE EFFECT OF CEREBROVASCULAR ACCIDENT (CVA): ICD-10-CM

## 2020-06-12 PROCEDURE — 1036F TOBACCO NON-USER: CPT | Performed by: FAMILY MEDICINE

## 2020-06-12 PROCEDURE — 99214 OFFICE O/P EST MOD 30 MIN: CPT | Performed by: FAMILY MEDICINE

## 2020-06-12 PROCEDURE — 3074F SYST BP LT 130 MM HG: CPT | Performed by: FAMILY MEDICINE

## 2020-06-12 PROCEDURE — 3078F DIAST BP <80 MM HG: CPT | Performed by: FAMILY MEDICINE

## 2020-06-12 RX ORDER — ESCITALOPRAM OXALATE 10 MG/1
10 TABLET ORAL DAILY
Qty: 90 TABLET | Refills: 1 | Status: SHIPPED | OUTPATIENT
Start: 2020-06-12 | End: 2020-10-12 | Stop reason: SDUPTHER

## 2020-06-12 RX ORDER — ATORVASTATIN CALCIUM 80 MG/1
80 TABLET, FILM COATED ORAL DAILY
Qty: 90 TABLET | Refills: 1 | Status: SHIPPED | OUTPATIENT
Start: 2020-06-12 | End: 2020-10-12 | Stop reason: SDUPTHER

## 2020-06-12 RX ORDER — ATORVASTATIN CALCIUM 80 MG/1
80 TABLET, FILM COATED ORAL DAILY
COMMUNITY
End: 2020-06-12 | Stop reason: SDUPTHER

## 2020-06-12 RX ORDER — POTASSIUM CHLORIDE 20 MEQ/1
20 TABLET, EXTENDED RELEASE ORAL DAILY
Qty: 90 TABLET | Refills: 1 | Status: SHIPPED | OUTPATIENT
Start: 2020-06-12 | End: 2020-10-12 | Stop reason: SDUPTHER

## 2020-09-01 ENCOUNTER — OFFICE VISIT (OUTPATIENT)
Dept: FAMILY MEDICINE CLINIC | Facility: CLINIC | Age: 65
End: 2020-09-01
Payer: COMMERCIAL

## 2020-09-01 VITALS — RESPIRATION RATE: 20 BRPM | DIASTOLIC BLOOD PRESSURE: 74 MMHG | HEART RATE: 84 BPM | SYSTOLIC BLOOD PRESSURE: 132 MMHG

## 2020-09-01 DIAGNOSIS — R47.01 EXPRESSIVE APHASIA: ICD-10-CM

## 2020-09-01 DIAGNOSIS — Z78.9 IMPAIRED MOBILITY AND ACTIVITIES OF DAILY LIVING: ICD-10-CM

## 2020-09-01 DIAGNOSIS — K21.9 GASTROESOPHAGEAL REFLUX DISEASE WITHOUT ESOPHAGITIS: ICD-10-CM

## 2020-09-01 DIAGNOSIS — M79.604 PAIN AND SWELLING OF LOWER EXTREMITY, RIGHT: Primary | ICD-10-CM

## 2020-09-01 DIAGNOSIS — I11.0 HYPERTENSIVE HEART DISEASE WITH CONGESTIVE HEART FAILURE, UNSPECIFIED HEART FAILURE TYPE (HCC): ICD-10-CM

## 2020-09-01 DIAGNOSIS — I69.351 SPASTIC HEMIPLEGIA OF RIGHT DOMINANT SIDE AS LATE EFFECT OF CEREBRAL INFARCTION (HCC): Chronic | ICD-10-CM

## 2020-09-01 DIAGNOSIS — M79.89 PAIN AND SWELLING OF LOWER EXTREMITY, RIGHT: Primary | ICD-10-CM

## 2020-09-01 DIAGNOSIS — Z74.09 IMPAIRED MOBILITY AND ACTIVITIES OF DAILY LIVING: ICD-10-CM

## 2020-09-01 PROCEDURE — 99214 OFFICE O/P EST MOD 30 MIN: CPT | Performed by: FAMILY MEDICINE

## 2020-09-01 NOTE — PROGRESS NOTES
Assessment/Plan:  Pain and redness in the right lower extremity pain on palpation in the mid calf suspect trauma 3 days prior  Will perform a Doppler to rule out DVT    Patient is wheelchair-bound the right leg is mobile post CVA  Will be performing a Doppler on the right lower extremity the caregiver and patient were advised to keep the  leg elevated and use warm compresses    Problem List Items Addressed This Visit     None           There are no diagnoses linked to this encounter  No problem-specific Assessment & Plan notes found for this encounter  PHQ-9 Depression Screening    PHQ-9:    Frequency of the following problems over the past two weeks: There is no height or weight on file to calculate BMI  BMI Counseling: There is no height or weight on file to calculate BMI  The BMI     Subjective:      Patient ID: Gilford Argue is a 59 y o  female  Brought in by caregiver with complaint of right leg swelling redness and pain  The patient is post CV with right sylvester deficit and has paresthesia of the right lower extremity and is wheelchair-bound  The following portions of the patient's history were reviewed and updated as appropriate:   She has a past medical history of CVA (cerebral vascular accident) ([address] ), CVA, old, aphasia, Depression, Hypertension, Hypertensive heart disease with congestive heart failure ([address] ) (2020), Medial meniscus tear, Mitral valve prolapse, Spastic hemiplegia of right dominant side as late effect of cerebral infarction ([address] ) (2019), and Stroke ([address] )  ,  does not have any pertinent problems on file  ,   has a past surgical history that includes Hysterectomy;  section; Tonsillectomy; Fracture surgery; and pr open rx femur fx+intramed rodriguez (Left, 2017)  ,  family history includes Arthritis in her mother; Heart attack in her father; Heart disease in her father  ,   reports that she has never smoked   She has never used smokeless tobacco  She reports previous alcohol use  She reports that she does not use drugs  ,  has No Known Allergies     Current Outpatient Medications   Medication Sig Dispense Refill    atorvastatin (LIPITOR) 80 mg tablet Take 1 tablet (80 mg total) by mouth daily 90 tablet 1    cholecalciferol (VITAMIN D3) 1,000 units tablet Take 2,000 Units by mouth daily       escitalopram (Lexapro) 10 mg tablet Take 1 tablet (10 mg total) by mouth daily 90 tablet 1    ibuprofen (MOTRIN) 600 mg tablet Take 1 tablet by mouth every 8 (eight) hours as needed for mild pain or moderate pain for up to 5 days 15 tablet 0    potassium chloride (K-DUR,KLOR-CON) 20 mEq tablet Take 1 tablet (20 mEq total) by mouth daily 90 tablet 1     No current facility-administered medications for this visit  Review of Systems   Musculoskeletal:        Pain redness and swelling of the right lower extremity         Objective:    /74   Pulse 84   Resp 20   LMP  (LMP Unknown)   There is no height or weight on file to calculate BMI  Physical Exam  Vitals signs and nursing note reviewed     Musculoskeletal:      Comments: Wheelchair-bound with pain on palpation of the mid calf of the right lower extremity with redness and +1 edema   Neurological:      Comments: Right sylvester deficit

## 2020-09-03 ENCOUNTER — HOSPITAL ENCOUNTER (OUTPATIENT)
Dept: NON INVASIVE DIAGNOSTICS | Facility: HOSPITAL | Age: 65
Discharge: HOME | End: 2020-09-03
Attending: FAMILY MEDICINE
Payer: COMMERCIAL

## 2020-09-03 DIAGNOSIS — M79.604 PAIN AND SWELLING OF LOWER EXTREMITY, RIGHT: ICD-10-CM

## 2020-09-03 DIAGNOSIS — M79.89 PAIN AND SWELLING OF LOWER EXTREMITY, RIGHT: ICD-10-CM

## 2020-09-03 PROCEDURE — 93971 EXTREMITY STUDY: CPT

## 2020-09-03 PROCEDURE — 93971 EXTREMITY STUDY: CPT | Performed by: SURGERY

## 2020-09-04 ENCOUNTER — TELEPHONE (OUTPATIENT)
Dept: FAMILY MEDICINE CLINIC | Facility: CLINIC | Age: 65
End: 2020-09-04

## 2020-09-04 DIAGNOSIS — N39.0 URINARY TRACT INFECTION WITHOUT HEMATURIA, SITE UNSPECIFIED: Primary | ICD-10-CM

## 2020-09-04 RX ORDER — CIPROFLOXACIN 250 MG/1
250 TABLET, FILM COATED ORAL EVERY 12 HOURS SCHEDULED
Qty: 14 TABLET | Refills: 0 | Status: SHIPPED | OUTPATIENT
Start: 2020-09-04 | End: 2020-09-11

## 2020-09-04 NOTE — TELEPHONE ENCOUNTER
C/O FREQUENT URINATION - GOES EVERY HOUR - AND UP THREE TIMES A NIGHT GOING - THINKS FACE LOOKS PUFFY - QUESTION IS THIS NORMAL FOR HER NOT GETTING RID OF ALL URINE AT ONCE?

## 2020-09-09 ENCOUNTER — APPOINTMENT (OUTPATIENT)
Dept: LAB | Facility: HOSPITAL | Age: 65
End: 2020-09-09
Attending: FAMILY MEDICINE
Payer: COMMERCIAL

## 2020-09-09 LAB
BACTERIA UR QL AUTO: ABNORMAL /HPF
BILIRUB UR QL STRIP: NEGATIVE
CLARITY UR: CLEAR
COLOR UR: YELLOW
GLUCOSE UR STRIP-MCNC: NEGATIVE MG/DL
HGB UR QL STRIP.AUTO: ABNORMAL
KETONES UR STRIP-MCNC: NEGATIVE MG/DL
LEUKOCYTE ESTERASE UR QL STRIP: NEGATIVE
NITRITE UR QL STRIP: NEGATIVE
NON-SQ EPI CELLS URNS QL MICRO: ABNORMAL /HPF
PH UR STRIP.AUTO: 5.5 [PH]
PROT UR STRIP-MCNC: NEGATIVE MG/DL
RBC #/AREA URNS AUTO: ABNORMAL /HPF
SP GR UR STRIP.AUTO: 1.01 (ref 1–1.03)
UROBILINOGEN UR QL STRIP.AUTO: 0.2 E.U./DL
WBC #/AREA URNS AUTO: ABNORMAL /HPF

## 2020-09-09 PROCEDURE — 81001 URINALYSIS AUTO W/SCOPE: CPT | Performed by: FAMILY MEDICINE

## 2020-09-15 ENCOUNTER — TELEPHONE (OUTPATIENT)
Dept: FAMILY MEDICINE CLINIC | Facility: CLINIC | Age: 65
End: 2020-09-15

## 2020-09-15 DIAGNOSIS — N39.0 URINARY TRACT INFECTION WITHOUT HEMATURIA, SITE UNSPECIFIED: Primary | ICD-10-CM

## 2020-09-15 RX ORDER — CIPROFLOXACIN 250 MG/1
250 TABLET, FILM COATED ORAL EVERY 12 HOURS SCHEDULED
Qty: 14 TABLET | Refills: 0 | Status: SHIPPED | OUTPATIENT
Start: 2020-09-15 | End: 2020-09-22

## 2020-09-15 NOTE — TELEPHONE ENCOUNTER
Cipro has been transmitted to the pharmacy there is also an order for repeat urinalysis the last urinalysis did not show an infection

## 2020-09-15 NOTE — TELEPHONE ENCOUNTER
MOM CALLED REGARDING URINE DONE ON 09/09/2020 - FINISHED CIPRO AND STILL GOING TO BATHROOM FREQUENTLY - PLEASE REVIEW AND ADVISE

## 2020-09-25 ENCOUNTER — APPOINTMENT (OUTPATIENT)
Dept: LAB | Facility: HOSPITAL | Age: 65
End: 2020-09-25
Attending: FAMILY MEDICINE
Payer: COMMERCIAL

## 2020-09-25 LAB
BACTERIA UR QL AUTO: ABNORMAL /HPF
BILIRUB UR QL STRIP: NEGATIVE
CLARITY UR: ABNORMAL
COLOR UR: YELLOW
GLUCOSE UR STRIP-MCNC: NEGATIVE MG/DL
HGB UR QL STRIP.AUTO: NEGATIVE
KETONES UR STRIP-MCNC: NEGATIVE MG/DL
LEUKOCYTE ESTERASE UR QL STRIP: ABNORMAL
NITRITE UR QL STRIP: NEGATIVE
NON-SQ EPI CELLS URNS QL MICRO: ABNORMAL /HPF
PH UR STRIP.AUTO: 7 [PH]
PROT UR STRIP-MCNC: NEGATIVE MG/DL
RBC #/AREA URNS AUTO: ABNORMAL /HPF
SP GR UR STRIP.AUTO: 1.02 (ref 1–1.03)
UROBILINOGEN UR QL STRIP.AUTO: 0.2 E.U./DL
WBC #/AREA URNS AUTO: ABNORMAL /HPF

## 2020-09-25 PROCEDURE — 81001 URINALYSIS AUTO W/SCOPE: CPT | Performed by: FAMILY MEDICINE

## 2020-10-01 ENCOUNTER — APPOINTMENT (OUTPATIENT)
Dept: LAB | Facility: HOSPITAL | Age: 65
End: 2020-10-01
Attending: FAMILY MEDICINE
Payer: COMMERCIAL

## 2020-10-01 DIAGNOSIS — I11.0 HYPERTENSIVE HEART DISEASE WITH CONGESTIVE HEART FAILURE, UNSPECIFIED HEART FAILURE TYPE (HCC): ICD-10-CM

## 2020-10-01 DIAGNOSIS — E78.2 MIXED HYPERLIPIDEMIA: ICD-10-CM

## 2020-10-01 LAB
ALBUMIN SERPL BCP-MCNC: 3.3 G/DL (ref 3.5–5)
ALP SERPL-CCNC: 211 U/L (ref 46–116)
ALT SERPL W P-5'-P-CCNC: 26 U/L (ref 12–78)
ANION GAP SERPL CALCULATED.3IONS-SCNC: 6 MMOL/L (ref 4–13)
AST SERPL W P-5'-P-CCNC: 23 U/L (ref 5–45)
BASOPHILS # BLD AUTO: 0.07 THOUSANDS/ΜL (ref 0–0.1)
BASOPHILS NFR BLD AUTO: 1 % (ref 0–1)
BILIRUB SERPL-MCNC: 0.7 MG/DL (ref 0.2–1)
BUN SERPL-MCNC: 14 MG/DL (ref 5–25)
CALCIUM ALBUM COR SERPL-MCNC: 9.6 MG/DL (ref 8.3–10.1)
CALCIUM SERPL-MCNC: 9 MG/DL (ref 8.3–10.1)
CHLORIDE SERPL-SCNC: 107 MMOL/L (ref 100–108)
CHOLEST SERPL-MCNC: 112 MG/DL (ref 50–200)
CO2 SERPL-SCNC: 29 MMOL/L (ref 21–32)
CREAT SERPL-MCNC: 1 MG/DL (ref 0.6–1.3)
EOSINOPHIL # BLD AUTO: 0.33 THOUSAND/ΜL (ref 0–0.61)
EOSINOPHIL NFR BLD AUTO: 4 % (ref 0–6)
ERYTHROCYTE [DISTWIDTH] IN BLOOD BY AUTOMATED COUNT: 13.6 % (ref 11.6–15.1)
GFR SERPL CREATININE-BSD FRML MDRD: 60 ML/MIN/1.73SQ M
GLUCOSE P FAST SERPL-MCNC: 91 MG/DL (ref 65–99)
HCT VFR BLD AUTO: 43.5 % (ref 34.8–46.1)
HGB BLD-MCNC: 13.8 G/DL (ref 11.5–15.4)
IMM GRANULOCYTES # BLD AUTO: 0.01 THOUSAND/UL (ref 0–0.2)
IMM GRANULOCYTES NFR BLD AUTO: 0 % (ref 0–2)
LDLC SERPL DIRECT ASSAY-MCNC: 52 MG/DL (ref 0–100)
LYMPHOCYTES # BLD AUTO: 2.7 THOUSANDS/ΜL (ref 0.6–4.47)
LYMPHOCYTES NFR BLD AUTO: 36 % (ref 14–44)
MCH RBC QN AUTO: 30.1 PG (ref 26.8–34.3)
MCHC RBC AUTO-ENTMCNC: 31.7 G/DL (ref 31.4–37.4)
MCV RBC AUTO: 95 FL (ref 82–98)
MONOCYTES # BLD AUTO: 0.53 THOUSAND/ΜL (ref 0.17–1.22)
MONOCYTES NFR BLD AUTO: 7 % (ref 4–12)
NEUTROPHILS # BLD AUTO: 3.92 THOUSANDS/ΜL (ref 1.85–7.62)
NEUTS SEG NFR BLD AUTO: 52 % (ref 43–75)
NRBC BLD AUTO-RTO: 0 /100 WBCS
PLATELET # BLD AUTO: 341 THOUSANDS/UL (ref 149–390)
PMV BLD AUTO: 9.2 FL (ref 8.9–12.7)
POTASSIUM SERPL-SCNC: 4.5 MMOL/L (ref 3.5–5.3)
PROT SERPL-MCNC: 7.4 G/DL (ref 6.4–8.2)
RBC # BLD AUTO: 4.58 MILLION/UL (ref 3.81–5.12)
SODIUM SERPL-SCNC: 142 MMOL/L (ref 136–145)
WBC # BLD AUTO: 7.56 THOUSAND/UL (ref 4.31–10.16)

## 2020-10-01 PROCEDURE — 85025 COMPLETE CBC W/AUTO DIFF WBC: CPT

## 2020-10-01 PROCEDURE — 80053 COMPREHEN METABOLIC PANEL: CPT

## 2020-10-01 PROCEDURE — 82465 ASSAY BLD/SERUM CHOLESTEROL: CPT

## 2020-10-01 PROCEDURE — 36415 COLL VENOUS BLD VENIPUNCTURE: CPT

## 2020-10-01 PROCEDURE — 83721 ASSAY OF BLOOD LIPOPROTEIN: CPT

## 2020-10-02 ENCOUNTER — TELEPHONE (OUTPATIENT)
Dept: FAMILY MEDICINE CLINIC | Facility: CLINIC | Age: 65
End: 2020-10-02

## 2020-10-02 DIAGNOSIS — N39.0 URINARY TRACT INFECTION WITHOUT HEMATURIA, SITE UNSPECIFIED: Primary | ICD-10-CM

## 2020-10-02 DIAGNOSIS — I63.9 CEREBROVASCULAR ACCIDENT (CVA), UNSPECIFIED MECHANISM (HCC): Primary | ICD-10-CM

## 2020-10-02 RX ORDER — CIPROFLOXACIN 250 MG/1
250 TABLET, FILM COATED ORAL EVERY 12 HOURS SCHEDULED
Qty: 20 TABLET | Refills: 0 | Status: SHIPPED | OUTPATIENT
Start: 2020-10-02 | End: 2020-10-12

## 2020-10-12 ENCOUNTER — OFFICE VISIT (OUTPATIENT)
Dept: FAMILY MEDICINE CLINIC | Facility: CLINIC | Age: 65
End: 2020-10-12
Payer: COMMERCIAL

## 2020-10-12 VITALS
HEART RATE: 84 BPM | RESPIRATION RATE: 20 BRPM | TEMPERATURE: 97.6 F | DIASTOLIC BLOOD PRESSURE: 80 MMHG | SYSTOLIC BLOOD PRESSURE: 132 MMHG

## 2020-10-12 DIAGNOSIS — Z78.9 IMPAIRED MOBILITY AND ACTIVITIES OF DAILY LIVING: ICD-10-CM

## 2020-10-12 DIAGNOSIS — I11.0 HYPERTENSIVE HEART DISEASE WITH CONGESTIVE HEART FAILURE, UNSPECIFIED HEART FAILURE TYPE (HCC): ICD-10-CM

## 2020-10-12 DIAGNOSIS — I69.351 SPASTIC HEMIPLEGIA OF RIGHT DOMINANT SIDE AS LATE EFFECT OF CEREBRAL INFARCTION (HCC): Chronic | ICD-10-CM

## 2020-10-12 DIAGNOSIS — Z86.73 H/O ISCHEMIC LEFT MCA STROKE: ICD-10-CM

## 2020-10-12 DIAGNOSIS — I69.398 DEPRESSION AS LATE EFFECT OF CEREBROVASCULAR ACCIDENT (CVA): ICD-10-CM

## 2020-10-12 DIAGNOSIS — M19.90 ARTHRITIS: Primary | ICD-10-CM

## 2020-10-12 DIAGNOSIS — M79.661 RIGHT CALF PAIN: ICD-10-CM

## 2020-10-12 DIAGNOSIS — F06.31 DEPRESSION AS LATE EFFECT OF CEREBROVASCULAR ACCIDENT (CVA): ICD-10-CM

## 2020-10-12 DIAGNOSIS — E78.2 MIXED HYPERLIPIDEMIA: ICD-10-CM

## 2020-10-12 DIAGNOSIS — Z74.09 IMPAIRED MOBILITY AND ACTIVITIES OF DAILY LIVING: ICD-10-CM

## 2020-10-12 DIAGNOSIS — R47.01 EXPRESSIVE APHASIA: ICD-10-CM

## 2020-10-12 DIAGNOSIS — I10 ESSENTIAL HYPERTENSION: ICD-10-CM

## 2020-10-12 PROCEDURE — 99214 OFFICE O/P EST MOD 30 MIN: CPT | Performed by: FAMILY MEDICINE

## 2020-10-12 RX ORDER — POTASSIUM CHLORIDE 20 MEQ/1
20 TABLET, EXTENDED RELEASE ORAL DAILY
Qty: 90 TABLET | Refills: 1 | Status: SHIPPED | OUTPATIENT
Start: 2020-10-12 | End: 2021-01-21 | Stop reason: SDUPTHER

## 2020-10-12 RX ORDER — ESCITALOPRAM OXALATE 10 MG/1
10 TABLET ORAL DAILY
Qty: 90 TABLET | Refills: 1 | Status: SHIPPED | OUTPATIENT
Start: 2020-10-12 | End: 2021-06-28

## 2020-10-12 RX ORDER — IBUPROFEN 600 MG/1
600 TABLET ORAL EVERY 8 HOURS PRN
Qty: 270 TABLET | Refills: 0 | Status: SHIPPED | OUTPATIENT
Start: 2020-10-12 | End: 2021-06-18

## 2020-10-12 RX ORDER — ATORVASTATIN CALCIUM 80 MG/1
80 TABLET, FILM COATED ORAL DAILY
Qty: 90 TABLET | Refills: 1 | Status: SHIPPED | OUTPATIENT
Start: 2020-10-12 | End: 2021-06-28

## 2020-10-15 ENCOUNTER — LAB (OUTPATIENT)
Dept: LAB | Facility: HOSPITAL | Age: 65
End: 2020-10-15
Attending: FAMILY MEDICINE
Payer: COMMERCIAL

## 2020-10-15 LAB
BACTERIA UR QL AUTO: ABNORMAL /HPF
BILIRUB UR QL STRIP: NEGATIVE
CLARITY UR: CLEAR
COLOR UR: YELLOW
GLUCOSE UR STRIP-MCNC: NEGATIVE MG/DL
HGB UR QL STRIP.AUTO: ABNORMAL
KETONES UR STRIP-MCNC: NEGATIVE MG/DL
LEUKOCYTE ESTERASE UR QL STRIP: ABNORMAL
NITRITE UR QL STRIP: NEGATIVE
NON-SQ EPI CELLS URNS QL MICRO: ABNORMAL /HPF
PH UR STRIP.AUTO: 5.5 [PH]
PROT UR STRIP-MCNC: NEGATIVE MG/DL
RBC #/AREA URNS AUTO: ABNORMAL /HPF
SP GR UR STRIP.AUTO: 1.02 (ref 1–1.03)
UROBILINOGEN UR QL STRIP.AUTO: 0.2 E.U./DL
WBC #/AREA URNS AUTO: ABNORMAL /HPF

## 2020-10-15 PROCEDURE — 81001 URINALYSIS AUTO W/SCOPE: CPT | Performed by: FAMILY MEDICINE

## 2020-10-15 PROCEDURE — 87086 URINE CULTURE/COLONY COUNT: CPT | Performed by: FAMILY MEDICINE

## 2020-10-15 PROCEDURE — 87077 CULTURE AEROBIC IDENTIFY: CPT | Performed by: FAMILY MEDICINE

## 2020-10-15 PROCEDURE — 87181 SC STD AGAR DILUTION PER AGT: CPT | Performed by: FAMILY MEDICINE

## 2020-10-15 PROCEDURE — 87186 SC STD MICRODIL/AGAR DIL: CPT | Performed by: FAMILY MEDICINE

## 2020-10-19 DIAGNOSIS — N30.90 CYSTITIS: Primary | ICD-10-CM

## 2020-10-19 LAB — BACTERIA UR CULT: ABNORMAL

## 2020-10-19 RX ORDER — NITROFURANTOIN MACROCRYSTALS 100 MG/1
100 CAPSULE ORAL 4 TIMES DAILY
Qty: 40 CAPSULE | Refills: 0 | Status: SHIPPED | OUTPATIENT
Start: 2020-10-19 | End: 2021-04-09 | Stop reason: SDUPTHER

## 2020-10-27 ENCOUNTER — TELEPHONE (OUTPATIENT)
Dept: FAMILY MEDICINE CLINIC | Facility: CLINIC | Age: 65
End: 2020-10-27

## 2020-10-27 DIAGNOSIS — I63.9 CEREBROVASCULAR ACCIDENT (CVA), UNSPECIFIED MECHANISM (HCC): Primary | ICD-10-CM

## 2020-10-27 RX ORDER — CUSHION
EACH MISCELLANEOUS DAILY
Qty: 1 EACH | Refills: 0 | Status: SHIPPED | OUTPATIENT
Start: 2020-10-27

## 2020-12-04 ENCOUNTER — TELEPHONE (OUTPATIENT)
Dept: FAMILY MEDICINE CLINIC | Facility: CLINIC | Age: 65
End: 2020-12-04

## 2020-12-04 DIAGNOSIS — L03.90 CELLULITIS, UNSPECIFIED CELLULITIS SITE: Primary | ICD-10-CM

## 2020-12-04 RX ORDER — CEPHALEXIN 500 MG/1
500 CAPSULE ORAL EVERY 6 HOURS SCHEDULED
Qty: 20 CAPSULE | Refills: 0 | Status: SHIPPED | OUTPATIENT
Start: 2020-12-04 | End: 2020-12-09

## 2020-12-15 ENCOUNTER — TELEPHONE (OUTPATIENT)
Dept: FAMILY MEDICINE CLINIC | Facility: CLINIC | Age: 65
End: 2020-12-15

## 2020-12-15 ENCOUNTER — OFFICE VISIT (OUTPATIENT)
Dept: FAMILY MEDICINE CLINIC | Facility: CLINIC | Age: 65
End: 2020-12-15
Payer: COMMERCIAL

## 2020-12-15 VITALS
RESPIRATION RATE: 20 BRPM | TEMPERATURE: 97.5 F | SYSTOLIC BLOOD PRESSURE: 126 MMHG | HEART RATE: 64 BPM | DIASTOLIC BLOOD PRESSURE: 74 MMHG

## 2020-12-15 DIAGNOSIS — I69.351 SPASTIC HEMIPLEGIA OF RIGHT DOMINANT SIDE AS LATE EFFECT OF CEREBRAL INFARCTION (HCC): ICD-10-CM

## 2020-12-15 DIAGNOSIS — Z78.9 IMPAIRED MOBILITY AND ACTIVITIES OF DAILY LIVING: ICD-10-CM

## 2020-12-15 DIAGNOSIS — K21.9 GASTROESOPHAGEAL REFLUX DISEASE WITHOUT ESOPHAGITIS: ICD-10-CM

## 2020-12-15 DIAGNOSIS — I10 ESSENTIAL HYPERTENSION: ICD-10-CM

## 2020-12-15 DIAGNOSIS — L03.90 CELLULITIS, UNSPECIFIED CELLULITIS SITE: Primary | ICD-10-CM

## 2020-12-15 DIAGNOSIS — Z74.09 IMPAIRED MOBILITY AND ACTIVITIES OF DAILY LIVING: ICD-10-CM

## 2020-12-15 PROCEDURE — 99213 OFFICE O/P EST LOW 20 MIN: CPT | Performed by: FAMILY MEDICINE

## 2020-12-15 RX ORDER — CEPHALEXIN 500 MG/1
500 CAPSULE ORAL EVERY 6 HOURS SCHEDULED
Qty: 28 CAPSULE | Refills: 0 | Status: SHIPPED | OUTPATIENT
Start: 2020-12-15 | End: 2020-12-22

## 2021-01-13 NOTE — ASSESSMENT & PLAN NOTE
Left midsternal chest pain-no radiation  Troponin negative x 3   Echo:   LEFT VENTRICLE:  Systolic function was normal  Ejection fraction was estimated to be 60 %  There were no regional wall motion abnormalities  Doppler parameters were consistent with abnormal left ventricular relaxation (grade 1 diastolic dysfunction)      RIGHT VENTRICLE:  The ventricle was mildly dilated  Systolic function was normal      MITRAL VALVE:  There was trace regurgitation      TRICUSPID VALVE:  There was trace regurgitation  Nuclear med stress test ordered however this has been postponed due to hypotension     Provide supportive care [facility]  Office: [address], DO, Anival Apple, DO, Jess Rubalcava, DO, Diego Barksdale Blood, DO, Waldo Cordero MD, Jon Blount MD, Giovani Mojica MD, Donny Escobar MD, Elizabeth Boston MD, Raissa Davis MD, Dalia Woodward MD, Felipe Lund MD, Emily Mcmahon MD, Deanne Lerma DO, Cheyenne Morris MD, Radha Capone MD, Candie Boas, DO, Lino Ramírez MD,  Jorge Lorenzo DO, Shonna Tripp MD, Rueben Severin, MD, [facility], [facility], [facility] Lisa, CNP, Carmella Taylor, CNP, Neymar Givens, CNS, Sandoval Macedo, CNP, Estevan Connor, CNP, Adama Dowling, CNP, Nara Aragon, CNP, Annie Dias, CNP, Annamaria Villalobos PA-C, Enedelia Zavaleta [facility], Guillaume Story, CNP, Kenyatta Shafer, CNP, Dvais Richter, CNP, All Ramachandran, CNP, Juju Irwin, [facility]    HISTORY AND PHYSICAL EXAMINATION            Date:   1/12/2021  Patient name:  Mary Kay Carney  Date of admission:  1/12/2021  5:46 PM  MRN:   [id number]  Account:  [de-identified]  YOB: 1964  PCP:    Socrates Dumont MD  Room:   [place] [address]  Code Status:    Full Code    Chief Complaint:     Chief Complaint   Patient presents with    Hypoglycemia     History Obtained From:     Patient, nursing staff, electronic medical record. History of Present Illness:     Mary Kay Carney is a 64 y.o. Non-/non  male who presents with Hypoglycemia   and is admitted to the hospital for the management of Hypoglycemia. Presents to the hospital with complaint of hypoglycemia. The patient is a poor historian and is oriented to self and place. He is able to give a partial history. He states that he was at home being cared for by his son. He states that his son is not familiar with his medications and inadvertently gave him too much insulin. He is unable to further elaborate on the situation. He has no complaints at time of assessment.   ER documents reflect that the patient's son accidentally gave him 74 units of long-acting insulin. Apparently the patient complained of feeling like his blood sugar was low at home and his son gave him insulin instead of sugar to be consumed. EMS was called and the patient was found to be hypoglycemic, he was given an amp of D50. He has received additional supplemental dextrose, his hypoglycemia is waxing and waning. No further information is available at this time. The patient has past medical history that includes CHF, diabetes, thyroid disease, cirrhosis of the liver and uses oxygen at home. Of note, he was admitted 11/4/2020 and treated for metabolic encephalopathy as well as a UTI secondary to chronic indwelling urinary catheter. Initial chemistries: Creatinine 1.53, blood glucose 69, WBC 10, HBG 11.8, HCT 39.4. He has been seen by Dr. Kumar Frost with neurology in the past.     Past Medical History:     Past Medical History:   Diagnosis Date    Anxiety and depression     Back pain, chronic     BPH (benign prostatic hyperplasia)     CHF (congestive heart failure) (HCC)     Cirrhosis ([address]     Diabetes mellitus ([address]     not checking bloodsugar at home    GERD (gastroesophageal reflux disease)     H/O cardiac catheterization not sure of date    no stents    Hepatitis     Pt does not know the type.      Hiatal hernia     History of alcohol abuse     Sober since 2013    Hyperlipidemia     not taking any medication    Hypertension     IBS (irritable bowel syndrome)     Morbid obesity ([address]     Neuropathy     feet,toes    On home oxygen therapy     prn    Pancreatitis     x 5 episodes    Primary osteoarthritis of right knee     Sleep apnea     No machine    Thyroid disease     Urinary bladder neurogenic dysfunction         Past Surgical History:     Past Surgical History:   Procedure Laterality Date    ABDOMEN SURGERY      hernia repair x4 (ventral)    COLONOSCOPY      2012    CYSTOSCOPY  01/24/2018    CYSTOSCOPY 02/20/2019    CYSTOSCOPY N/A 2/20/2019    CYSTOSCOPY DILATION performed by Luis Alfredo Copeland MD at [address] 8/23/2019    CYSTOSCOPY/ DILATION NICOLE CATHETER EXCHANGE performed by Luis Alfredo Copeland MD at [facility], DIAGNOSTIC     [address]  05/20/2018    repair and reduction of recurrent incarcerated incisional hernia with mesh    [place] CYSTOURETHROSCOPY N/A 1/24/2018    CYSTOSCOPY Mal Laughter performed by Luis Alfredo Copeland MD at [address] Bilateral 8/22/2017    FOOT [facility] with bone bx performed by Mary Jane Acevedo DPM at [address] EGD TRANSORAL BIOPSY SINGLE/MULTIPLE N/A 7/19/2017    EGD BIOPSY performed by Kalia Snyder MD at [address]  07/19/2017    polypectomy    UPPER GASTROINTESTINAL ENDOSCOPY N/A 3/14/2019    EGD BIOPSY performed by Coty Acosta MD at [address] N/A 5/20/2018    REPAIR AND REDUCTION OF RECURRENT INCARCERATED INCISIONAL HERNIA WITH MESH performed by Evelin Pradhan MD at [facility]        Medications Prior to Admission:     Prior to Admission medications    Medication Sig Start Date End Date Taking? Authorizing Provider   acetaminophen (TYLENOL) 325 MG tablet Take 2 tablets by mouth every 6 hours as needed for Pain or Fever 11/10/20   Jude Samayoa, DO   pregabalin (LYRICA) 150 MG capsule Take 1 capsule by mouth 2 times daily for 3 days. 11/10/20 11/13/20  Camacho Samayoa, DO   clonazePAM (KLONOPIN) 1 MG tablet Take 1 tablet by mouth 2 times daily as needed for Anxiety for up to 3 days.  11/10/20 11/13/20  Jeleora Maradiagap, DO   insulin lispro (HUMALOG) 100 UNIT/ML injection vial Inject 0-3 Units into the skin nightly 11/10/20   Jude Holland Orcristelap, DO   insulin lispro (HUMALOG) 100 UNIT/ML injection vial Inject 0-6 Units into the skin 3 times daily (with meals) 11/10/20   Mily Adame, DO spironolactone (ALDACTONE) 25 MG tablet Take 3 tablets by mouth 2 times daily Indications: pt takes 25mg and 50mg tab to equal 75mg BID 11/10/20   Blank Samayoa DO   rifaximin (XIFAXAN) 550 MG tablet Take 1 tablet by mouth 2 times daily 11/10/20   Blank Samayoa DO   bethanechol (URECHOLINE) 10 MG tablet Take 1 tablet by mouth 3 times daily 11/10/20   Camacho Samayoa DO   QUEtiapine (SEROQUEL XR) 50 MG extended release tablet Take 50 mg by mouth nightly    Historical Provider, MD   glipiZIDE (GLUCOTROL) 10 MG tablet Take 10 mg by mouth 2 times daily (before meals)    Historical Provider, MD   triamcinolone (KENALOG) 0.025 % cream Apply topically 2 times daily as needed    Historical Provider, MD   diphenoxylate-atropine (LOMOTIL) 2.5-0.025 MG per tablet Take 1 tablet by mouth 4 times daily as needed for Diarrhea.     Historical Provider, MD   fluocinonide (LIDEX) 0.05 % external solution Apply topically as needed (scalp itching)    Historical Provider, MD   ketoconazole (NIZORAL) 2 % shampoo Apply topically Twice a Week    Historical Provider, MD   potassium chloride (KLOR-CON) 10 MEQ extended release tablet Take 20 mEq by mouth daily (with breakfast) Takes 2 tabs (=20mEq) daily    Historical Provider, MD   albuterol sulfate HFA (PROAIR HFA) 108 (90 Base) MCG/ACT inhaler Inhale 2 puffs into the lungs every 6 hours as needed for Wheezing    Historical Provider, MD   Insulin Glargine (BASAGLAR KWIKPEN SC) Inject 76 Units into the skin 2 times daily     Historical Provider, MD   tamsulosin (FLOMAX) 0.4 MG capsule Take 1 capsule by mouth daily 1/24/18   Ezella Person, MD   hydrOXYzine (ATARAX) 25 MG tablet Take 25 mg by mouth 3 times daily as needed for Itching    Historical Provider, MD   oxybutynin (DITROPAN) 5 MG tablet Take 1 tablet by mouth 2 times daily 12/12/17   Blank Samayoa DO   bumetanide (BUMEX) 2 MG tablet Take 2 mg by mouth 2 times daily    Historical Provider, MD   metFORMIN (GLUCOPHAGE) 500 MG Status: He is alert. He is disoriented. GCS: GCS eye subscore is 4. GCS verbal subscore is 4. GCS motor subscore is 6. Comments: Clonic jerking movement of upper and lower extremities. Investigations:      Laboratory Testing:  Recent Results (from the past 24 hour(s))   POC Glucose Fingerstick    Collection Time: 01/12/21  6:04 PM   Result Value Ref Range    POC Glucose 93 75 - 110 mg/dL   POCT Glucose    Collection Time: 01/12/21  6:10 PM   Result Value Ref Range    Glucose 94 mg/dL    QC OK?  yes    CBC Auto Differential    Collection Time: 01/12/21  6:35 PM   Result Value Ref Range    WBC 10.0 3.5 - 11.3 k/uL    RBC 4.58 4.21 - 5.77 m/uL    Hemoglobin 11.8 (L) 13.0 - 17.0 g/dL    Hematocrit 39.4 (L) 40.7 - 50.3 %    MCV 86.0 82.6 - 102.9 fL    MCH 25.8 25.2 - 33.5 pg    MCHC 29.9 28.4 - 34.8 g/dL    RDW 16.9 (H) 11.8 - 14.4 %    Platelets 91 (L) 447 - 453 k/uL    MPV 10.4 8.1 - 13.5 fL    NRBC Automated 0.0 0.0 per 100 WBC    Differential Type NOT REPORTED     Seg Neutrophils 81 (H) 36 - 65 %    Lymphocytes 10 (L) 24 - 43 %    Monocytes 6 3 - 12 %    Eosinophils % 2 1 - 4 %    Basophils 0 0 - 2 %    Immature Granulocytes 1 (H) 0 %    Segs Absolute 8.07 1.50 - 8.10 k/uL    Absolute Lymph # 0.98 (L) 1.10 - 3.70 k/uL    Absolute Mono # 0.62 0.10 - 1.20 k/uL    Absolute Eos # 0.16 0.00 - 0.44 k/uL    Basophils Absolute 0.04 0.00 - 0.20 k/uL    Absolute Immature Granulocyte 0.08 0.00 - 0.30 k/uL    WBC Morphology NOT REPORTED     RBC Morphology ANISOCYTOSIS PRESENT     Platelet Estimate NOT REPORTED    Basic Metabolic Panel    Collection Time: 01/12/21  6:35 PM   Result Value Ref Range    Glucose 107 (H) 70 - 99 mg/dL    BUN 54 (H) 6 - 20 mg/dL    CREATININE 1.53 (H) 0.70 - 1.20 mg/dL    Bun/Cre Ratio 35 (H) 9 - 20    Calcium 9.2 8.6 - 10.4 mg/dL    Sodium 137 135 - 144 mmol/L    Potassium 4.6 3.7 - 5.3 mmol/L    Chloride 101 98 - 107 mmol/L    CO2 24 20 - 31 mmol/L    Anion Gap 12 9 - 17 mmol/L GFR Non-African American 47 (L) >60 mL/min    GFR  57 (L) >60 mL/min    GFR Comment          GFR Staging NOT REPORTED    POC Glucose Fingerstick    Collection Time: 01/12/21  7:16 PM   Result Value Ref Range    POC Glucose 69 (L) 75 - 110 mg/dL   POC Glucose Fingerstick    Collection Time: 01/12/21  7:54 PM   Result Value Ref Range    POC Glucose 75 75 - 110 mg/dL       Imaging/Diagnostics:  No results found. Assessment :      Hospital Problems           Last Modified POA    * (Principal) Hypoglycemia 1/13/2021 Yes    Type 2 diabetes mellitus with diabetic neuropathy, with long-term current use of insulin ([facility] 1/12/2021 Yes    Essential hypertension 1/12/2021 Yes    Dyslipidemia 1/12/2021 Yes    Acquired hypothyroidism 1/12/2021 Yes    LEONARDA (acute kidney injury) ([facility] 1/12/2021 Yes    Morbid obesity with BMI of 50.0-59.9, adult ([facility] 1/12/2021 Yes    On home oxygen therapy 1/12/2021 Yes    Overview Signed 11/4/2020  2:08 PM by SUNSHINE Felix - NP     prn               Plan:     Patient status inpatient in the  Progressive Unit/Step down unit. 1. Consult case management- may need skilled nursing placement. 2. DM- hold diabetic medications and insulin for now, check blood glucose q2h at minimum and PRN. Start D5% infusion. 3. LEONARDA- monitor renal function, avoid nephrotoxic agents. Hold aldactone. 4. Check UA/CNS, CMP, ammonia secondary to confusion. 5. HTN- monitor and control blood pressure, continue nadolol. 6. Hypothyroidism- check TSH, continue levothyroxine. 7. Supplemental oxygen as needed. 8. Obesity- lifestyle modification. 9. Hold chemical DVT prophylaxis secondary to thrombocytopenia, monitor platelet count. Utilize EPC cuffs for now. 10. Low sodium diet. 11. Activity as tolerated with assist.  12. PT/OT. Plan of care discussed with [facility] NICOLE ZHANG RN.      Consultations:   IP CONSULT TO HOSPITALIST  IP CONSULT TO CASE MANAGEMENT     Patient is admitted as inpatient status because of co-morbidities listed above, severity of signs and symptoms as outlined, requirement for current medical therapies and most importantly because of direct risk to patient if care not provided in a hospital setting. Expected length of stay > 48 hours.     SUNSHINE Esteban CNP  1/12/2021  9:37 PM    Copy sent to Dr. Rajinder York MD     (Please note that portions of this note were completed with a voice recognition program. Efforts were made to edit the dictations but occasionally words are mis-transcribed.)

## 2021-01-21 ENCOUNTER — OFFICE VISIT (OUTPATIENT)
Dept: FAMILY MEDICINE CLINIC | Facility: CLINIC | Age: 66
End: 2021-01-21
Payer: COMMERCIAL

## 2021-01-21 VITALS
DIASTOLIC BLOOD PRESSURE: 74 MMHG | RESPIRATION RATE: 20 BRPM | SYSTOLIC BLOOD PRESSURE: 132 MMHG | HEART RATE: 84 BPM | TEMPERATURE: 98.2 F

## 2021-01-21 DIAGNOSIS — I11.0 HYPERTENSIVE HEART DISEASE WITH CONGESTIVE HEART FAILURE, UNSPECIFIED HEART FAILURE TYPE (HCC): ICD-10-CM

## 2021-01-21 DIAGNOSIS — I10 ESSENTIAL HYPERTENSION: ICD-10-CM

## 2021-01-21 DIAGNOSIS — R47.01 EXPRESSIVE APHASIA: ICD-10-CM

## 2021-01-21 DIAGNOSIS — Z86.73 H/O ISCHEMIC LEFT MCA STROKE: Primary | ICD-10-CM

## 2021-01-21 DIAGNOSIS — I69.351 SPASTIC HEMIPLEGIA OF RIGHT DOMINANT SIDE AS LATE EFFECT OF CEREBRAL INFARCTION (HCC): ICD-10-CM

## 2021-01-21 DIAGNOSIS — F06.31 DEPRESSION AS LATE EFFECT OF CEREBROVASCULAR ACCIDENT (CVA): ICD-10-CM

## 2021-01-21 DIAGNOSIS — Z74.09 IMPAIRED MOBILITY AND ACTIVITIES OF DAILY LIVING: ICD-10-CM

## 2021-01-21 DIAGNOSIS — Z78.9 IMPAIRED MOBILITY AND ACTIVITIES OF DAILY LIVING: ICD-10-CM

## 2021-01-21 DIAGNOSIS — I69.398 DEPRESSION AS LATE EFFECT OF CEREBROVASCULAR ACCIDENT (CVA): ICD-10-CM

## 2021-01-21 DIAGNOSIS — K21.9 GASTROESOPHAGEAL REFLUX DISEASE WITHOUT ESOPHAGITIS: ICD-10-CM

## 2021-01-21 DIAGNOSIS — E78.2 MIXED HYPERLIPIDEMIA: ICD-10-CM

## 2021-01-21 DIAGNOSIS — E55.9 VITAMIN D DEFICIENCY: ICD-10-CM

## 2021-01-21 PROCEDURE — 99214 OFFICE O/P EST MOD 30 MIN: CPT | Performed by: FAMILY MEDICINE

## 2021-01-21 RX ORDER — POTASSIUM CHLORIDE 20 MEQ/1
20 TABLET, EXTENDED RELEASE ORAL DAILY
Qty: 90 TABLET | Refills: 1 | Status: SHIPPED | OUTPATIENT
Start: 2021-01-21 | End: 2021-10-26

## 2021-01-21 NOTE — PROGRESS NOTES
Assessment/Plan:  Post cerebrovascular accident with right sylvester deficit wheelchair-bound up and aphasic  CVA was ischemic to the left middle cerebral artery  History of hypertensive cardiovascular disease with congestive heart failure currently not in need of antihypertensive with a blood pressure of 132/74  Expressive aphasia unchanged    History of gastroesophageal reflux disease currently quiescent  Spastic hemiplegia of the right dominant side late effect of CVA    Impaired mobility in activities of daily living        Problem List Items Addressed This Visit        Cardiovascular and Mediastinum    Hypertensive heart disease with congestive heart failure ([facility] )           Diagnoses and all orders for this visit:    Hypertensive heart disease with congestive heart failure, unspecified heart failure type (HCC)  -     potassium chloride (K-DUR,KLOR-CON) 20 mEq tablet; Take 1 tablet (20 mEq total) by mouth daily        No problem-specific Assessment & Plan notes found for this encounter  PHQ-9 Depression Screening    PHQ-9:   Frequency of the following problems over the past two weeks:      Little interest or pleasure in doing things: 0 - not at all  Feeling down, depressed, or hopeless: 0 - not at all  PHQ-2 Score: 0          There is no height or weight on file to calculate BMI  BMI Counseling: There is no height or weight on file to calculate BMI  The BMI     Subjective:      Patient ID: Leeann Lind is a 72 y o  female      Patient presents accompanied by caregiver via wheelchair patient is aphasic      The following portions of the patient's history were reviewed and updated as appropriate:   She has a past medical history of CVA (cerebral vascular accident) ([facility] CVA, old, aphasia, Depression, Hypertension, Hypertensive heart disease with congestive heart failure ([facility] ) (6/12/2020), Medial meniscus tear, Mitral valve prolapse, Spastic hemiplegia of right dominant side as late effect of cerebral infarction [facility]) (2019), and Stroke [facility])  ,  does not have any pertinent problems on file  ,   has a past surgical history that includes Hysterectomy;  section; Tonsillectomy; Fracture surgery; and pr open rx femur fx+intramed rodriguez (Left, 2017)  ,  family history includes Arthritis in her mother; Heart attack in her father; Heart disease in her father  ,   reports that she has never smoked  She has never used smokeless tobacco  She reports previous alcohol use  She reports that she does not use drugs  ,  has No Known Allergies     Current Outpatient Medications   Medication Sig Dispense Refill    atorvastatin (LIPITOR) 80 mg tablet Take 1 tablet (80 mg total) by mouth daily 90 tablet 1    cholecalciferol (VITAMIN D3) 1,000 units tablet Take 2,000 Units by mouth daily       escitalopram (Lexapro) 10 mg tablet Take 1 tablet (10 mg total) by mouth daily 90 tablet 1    ibuprofen (MOTRIN) 600 mg tablet Take 1 tablet (600 mg total) by mouth every 8 (eight) hours as needed for mild pain or moderate pain for up to 5 days 270 tablet 0    Misc  Devices (Wheelchair Cushion) [facility] by Does not apply route daily Combo cushion 1 each 0    nitrofurantoin (MACRODANTIN) 100 mg capsule Take 1 capsule (100 mg total) by mouth 4 (four) times a day 40 capsule 0    potassium chloride (K-DUR,KLOR-CON) 20 mEq tablet Take 1 tablet (20 mEq total) by mouth daily 90 tablet 1     No current facility-administered medications for this visit  Review of Systems   Constitutional: Negative for chills and fever  HENT: Negative for ear pain and sore throat  Eyes: Negative for pain and visual disturbance  Respiratory: Negative for cough and shortness of breath  Cardiovascular: Negative for chest pain and palpitations  Gastrointestinal: Negative for abdominal pain and vomiting  Genitourinary: Negative for dysuria and hematuria  Musculoskeletal: Negative for arthralgias and back pain     Skin: Negative for color change and rash  Neurological: Negative for seizures and syncope  Wheelchair-bound post CVA with a right hemiplegia   All other systems reviewed and are negative  Objective:    /74   Pulse 84   Temp 98 2 °F (36 8 °C)   Resp 20   LMP  (LMP Unknown)   There is no height or weight on file to calculate BMI  Physical Exam  Constitutional:       Appearance: She is well-developed  HENT:      Head: Normocephalic  Eyes:      Pupils: Pupils are equal, round, and reactive to light  Neck:      Musculoskeletal: Normal range of motion  Cardiovascular:      Rate and Rhythm: Normal rate and regular rhythm  Heart sounds: Normal heart sounds  Pulmonary:      Effort: Pulmonary effort is normal       Breath sounds: Normal breath sounds  Abdominal:      General: Bowel sounds are normal       Palpations: Abdomen is soft  Tenderness: There is no abdominal tenderness  Musculoskeletal:      Comments: Wheelchair-bound with right spastic paralysis   Skin:     General: Skin is warm  Neurological:      Mental Status: She is alert and oriented to person, place, and time        Comments: Right hemiplegia and aphasia

## 2021-04-09 DIAGNOSIS — N30.90 CYSTITIS: ICD-10-CM

## 2021-04-09 RX ORDER — NITROFURANTOIN MACROCRYSTALS 100 MG/1
100 CAPSULE ORAL 4 TIMES DAILY
Qty: 360 CAPSULE | Refills: 1 | Status: SHIPPED | OUTPATIENT
Start: 2021-04-09 | End: 2021-05-20 | Stop reason: ALTCHOICE

## 2021-05-20 ENCOUNTER — OFFICE VISIT (OUTPATIENT)
Dept: FAMILY MEDICINE CLINIC | Facility: CLINIC | Age: 66
End: 2021-05-20
Payer: COMMERCIAL

## 2021-05-20 VITALS
HEIGHT: 68 IN | DIASTOLIC BLOOD PRESSURE: 68 MMHG | WEIGHT: 186 LBS | HEART RATE: 84 BPM | TEMPERATURE: 98.4 F | SYSTOLIC BLOOD PRESSURE: 124 MMHG | BODY MASS INDEX: 28.19 KG/M2 | RESPIRATION RATE: 20 BRPM

## 2021-05-20 DIAGNOSIS — I69.351 SPASTIC HEMIPLEGIA OF RIGHT DOMINANT SIDE AS LATE EFFECT OF CEREBRAL INFARCTION (HCC): ICD-10-CM

## 2021-05-20 DIAGNOSIS — Z74.09 IMPAIRED MOBILITY AND ACTIVITIES OF DAILY LIVING: ICD-10-CM

## 2021-05-20 DIAGNOSIS — F06.31 DEPRESSION AS LATE EFFECT OF CEREBROVASCULAR ACCIDENT (CVA): ICD-10-CM

## 2021-05-20 DIAGNOSIS — E78.2 MIXED HYPERLIPIDEMIA: ICD-10-CM

## 2021-05-20 DIAGNOSIS — Z86.73 H/O ISCHEMIC LEFT MCA STROKE: Primary | ICD-10-CM

## 2021-05-20 DIAGNOSIS — Z78.9 IMPAIRED MOBILITY AND ACTIVITIES OF DAILY LIVING: ICD-10-CM

## 2021-05-20 DIAGNOSIS — I69.398 DEPRESSION AS LATE EFFECT OF CEREBROVASCULAR ACCIDENT (CVA): ICD-10-CM

## 2021-05-20 DIAGNOSIS — R47.01 EXPRESSIVE APHASIA: ICD-10-CM

## 2021-05-20 PROCEDURE — 99213 OFFICE O/P EST LOW 20 MIN: CPT | Performed by: FAMILY MEDICINE

## 2021-05-20 NOTE — PROGRESS NOTES
Assessment/Plan: aphasic with normal cognition the patient is able to manage her finances  Status post massive CVA with right-sided deficit and aphasia  The patient has normal cognition   and is able to handle her finances    Costochondritis of the right chest wall most likely secondary to hemiplegia  Problem List Items Addressed This Visit     None           There are no diagnoses linked to this encounter  No problem-specific Assessment & Plan notes found for this encounter  PHQ-9 Depression Screening    PHQ-9:   Frequency of the following problems over the past two weeks: Body mass index is 28 28 kg/m²  BMI Counseling: Body mass index is 28 28 kg/m²  The BMI     Subjective:      Patient ID: Davina Roberts is a 72 y o  female  Patient is brought in accompanied by caregiver by wheelchair  The following portions of the patient's history were reviewed and updated as appropriate:   She has a past medical history of CVA (cerebral vascular accident) ([facility] [address] CVA, old, aphasia, Depression, Hypertension, Hypertensive heart disease with congestive heart failure ([facility] ) (2020), Medial meniscus tear, Mitral valve prolapse, Spastic hemiplegia of right dominant side as late effect of cerebral infarction ([facility] ) (2019), and Stroke ([facility]  ,  does not have any pertinent problems on file  ,   has a past surgical history that includes Hysterectomy;  section; Tonsillectomy; Fracture surgery; and pr open rx femur fx+intramed rodriguez (Left, 2017)  ,  family history includes Arthritis in her mother; Heart attack in her father; Heart disease in her father  ,   reports that she has never smoked  She has never used smokeless tobacco  She reports previous alcohol use  She reports that she does not use drugs  ,  has No Known Allergies     Current Outpatient Medications   Medication Sig Dispense Refill    atorvastatin (LIPITOR) 80 mg tablet Take 1 tablet (80 mg total) by mouth daily 90 tablet 1  cholecalciferol (VITAMIN D3) 1,000 units tablet Take 2,000 Units by mouth daily       escitalopram (Lexapro) 10 mg tablet Take 1 tablet (10 mg total) by mouth daily 90 tablet 1    ibuprofen (MOTRIN) 600 mg tablet Take 1 tablet (600 mg total) by mouth every 8 (eight) hours as needed for mild pain or moderate pain for up to 5 days 270 tablet 0    Misc  Devices (Wheelchair Cushion) [facility] by Does not apply route daily Combo cushion 1 each 0    potassium chloride (K-DUR,KLOR-CON) 20 mEq tablet Take 1 tablet (20 mEq total) by mouth daily 90 tablet 1     No current facility-administered medications for this visit  Review of Systems   Constitutional: Negative for chills and fever  HENT: Negative for ear pain and sore throat  Eyes: Negative for pain and visual disturbance  Respiratory: Negative for cough and shortness of breath  Cardiovascular: Negative for chest pain and palpitations  Gastrointestinal: Negative for abdominal pain and vomiting  Genitourinary: Negative for dysuria and hematuria  Musculoskeletal: Negative for arthralgias and back pain  Right arm and right leg discomfort Motrin made available   Skin: Negative for color change and rash  Neurological: Negative for seizures and syncope  Aphasic with normal cognition   All other systems reviewed and are negative  Objective:    /68   Pulse 84   Temp 98 4 °F (36 9 °C)   Resp 20   Ht 5' 8" (1 727 m)   Wt 84 4 kg (186 lb)   LMP  (LMP Unknown)   BMI 28 28 kg/m²   Body mass index is 28 28 kg/m²  Physical Exam  Constitutional:       Appearance: She is well-developed  HENT:      Head: Normocephalic  Eyes:      Pupils: Pupils are equal, round, and reactive to light  Neck:      Musculoskeletal: Normal range of motion  Cardiovascular:      Rate and Rhythm: Normal rate and regular rhythm  Heart sounds: Normal heart sounds     Pulmonary:      Effort: Pulmonary effort is normal       Breath sounds: Normal breath sounds  Abdominal:      General: Bowel sounds are normal       Palpations: Abdomen is soft  Tenderness: There is no abdominal tenderness  Musculoskeletal:      Comments: Muscle wasting of the right upper and right lower extremity patient is wheelchair-bound   Skin:     General: Skin is warm  Neurological:      Mental Status: She is alert and oriented to person, place, and time        Comments: Right-sided upper and lower extremity deficit

## 2021-06-17 DIAGNOSIS — M19.90 ARTHRITIS: ICD-10-CM

## 2021-06-18 RX ORDER — IBUPROFEN 600 MG/1
TABLET ORAL
Qty: 270 TABLET | Refills: 3 | Status: SHIPPED | OUTPATIENT
Start: 2021-06-18 | End: 2022-05-26

## 2021-06-22 DIAGNOSIS — F06.31 DEPRESSION AS LATE EFFECT OF CEREBROVASCULAR ACCIDENT (CVA): ICD-10-CM

## 2021-06-22 DIAGNOSIS — E78.2 MIXED HYPERLIPIDEMIA: ICD-10-CM

## 2021-06-22 DIAGNOSIS — I69.398 DEPRESSION AS LATE EFFECT OF CEREBROVASCULAR ACCIDENT (CVA): ICD-10-CM

## 2021-06-28 RX ORDER — ESCITALOPRAM OXALATE 10 MG/1
TABLET ORAL
Qty: 90 TABLET | Refills: 3 | Status: SHIPPED | OUTPATIENT
Start: 2021-06-28 | End: 2022-02-28 | Stop reason: SDUPTHER

## 2021-06-28 RX ORDER — ATORVASTATIN CALCIUM 80 MG/1
TABLET, FILM COATED ORAL
Qty: 90 TABLET | Refills: 3 | Status: SHIPPED | OUTPATIENT
Start: 2021-06-28 | End: 2022-02-28 | Stop reason: SDUPTHER

## 2021-07-08 DIAGNOSIS — R15.9 INCONTINENCE OF FECES, UNSPECIFIED FECAL INCONTINENCE TYPE: Primary | ICD-10-CM

## 2021-07-08 NOTE — TELEPHONE ENCOUNTER
[facility] requests new script faxed to [phone]  For 1 case of wipes per month with refills

## 2021-07-31 ENCOUNTER — APPOINTMENT (EMERGENCY)
Dept: RADIOLOGY | Facility: HOSPITAL | Age: 66
End: 2021-07-31
Payer: COMMERCIAL

## 2021-07-31 ENCOUNTER — HOSPITAL ENCOUNTER (EMERGENCY)
Facility: HOSPITAL | Age: 66
Discharge: HOME | End: 2021-07-31
Attending: EMERGENCY MEDICINE | Admitting: EMERGENCY MEDICINE
Payer: COMMERCIAL

## 2021-07-31 ENCOUNTER — APPOINTMENT (EMERGENCY)
Dept: CT IMAGING | Facility: HOSPITAL | Age: 66
End: 2021-07-31
Payer: COMMERCIAL

## 2021-07-31 VITALS
HEART RATE: 75 BPM | SYSTOLIC BLOOD PRESSURE: 121 MMHG | OXYGEN SATURATION: 95 % | RESPIRATION RATE: 18 BRPM | TEMPERATURE: 97.8 F | HEIGHT: 68 IN | DIASTOLIC BLOOD PRESSURE: 58 MMHG | WEIGHT: 185 LBS | BODY MASS INDEX: 28.04 KG/M2

## 2021-07-31 DIAGNOSIS — R07.9 CHEST PAIN: Primary | ICD-10-CM

## 2021-07-31 LAB
ALBUMIN SERPL BCP-MCNC: 3.8 G/DL (ref 3.5–5)
ALP SERPL-CCNC: 156 U/L (ref 46–116)
ALT SERPL W P-5'-P-CCNC: 31 U/L (ref 12–78)
ANION GAP SERPL CALCULATED.3IONS-SCNC: 8 MMOL/L (ref 4–13)
APTT PPP: 22 SECONDS (ref 23–37)
AST SERPL W P-5'-P-CCNC: 25 U/L (ref 5–45)
BASOPHILS # BLD AUTO: 0.03 THOUSANDS/ΜL (ref 0–0.1)
BASOPHILS NFR BLD AUTO: 0 % (ref 0–1)
BILIRUB SERPL-MCNC: 0.57 MG/DL (ref 0.2–1)
BUN SERPL-MCNC: 14 MG/DL (ref 5–25)
CALCIUM SERPL-MCNC: 9.4 MG/DL (ref 8.3–10.1)
CHLORIDE SERPL-SCNC: 106 MMOL/L (ref 100–108)
CO2 SERPL-SCNC: 30 MMOL/L (ref 21–32)
CREAT SERPL-MCNC: 0.79 MG/DL (ref 0.6–1.3)
D DIMER PPP FEU-MCNC: 1.17 UG/ML FEU
EOSINOPHIL # BLD AUTO: 0.09 THOUSAND/ΜL (ref 0–0.61)
EOSINOPHIL NFR BLD AUTO: 1 % (ref 0–6)
ERYTHROCYTE [DISTWIDTH] IN BLOOD BY AUTOMATED COUNT: 13.6 % (ref 11.6–15.1)
GFR SERPL CREATININE-BSD FRML MDRD: 79 ML/MIN/1.73SQ M
GLUCOSE SERPL-MCNC: 100 MG/DL (ref 65–140)
HCT VFR BLD AUTO: 47.9 % (ref 34.8–46.1)
HGB BLD-MCNC: 15.1 G/DL (ref 11.5–15.4)
IMM GRANULOCYTES # BLD AUTO: 0.01 THOUSAND/UL (ref 0–0.2)
IMM GRANULOCYTES NFR BLD AUTO: 0 % (ref 0–2)
INR PPP: 0.91 (ref 0.84–1.19)
LYMPHOCYTES # BLD AUTO: 2.52 THOUSANDS/ΜL (ref 0.6–4.47)
LYMPHOCYTES NFR BLD AUTO: 35 % (ref 14–44)
MCH RBC QN AUTO: 30.3 PG (ref 26.8–34.3)
MCHC RBC AUTO-ENTMCNC: 31.5 G/DL (ref 31.4–37.4)
MCV RBC AUTO: 96 FL (ref 82–98)
MONOCYTES # BLD AUTO: 0.43 THOUSAND/ΜL (ref 0.17–1.22)
MONOCYTES NFR BLD AUTO: 6 % (ref 4–12)
NEUTROPHILS # BLD AUTO: 4.13 THOUSANDS/ΜL (ref 1.85–7.62)
NEUTS SEG NFR BLD AUTO: 58 % (ref 43–75)
NRBC BLD AUTO-RTO: 0 /100 WBCS
PLATELET # BLD AUTO: 348 THOUSANDS/UL (ref 149–390)
PMV BLD AUTO: 9.6 FL (ref 8.9–12.7)
POTASSIUM SERPL-SCNC: 4.2 MMOL/L (ref 3.5–5.3)
PROT SERPL-MCNC: 8.3 G/DL (ref 6.4–8.2)
PROTHROMBIN TIME: 12.1 SECONDS (ref 11.6–14.5)
RBC # BLD AUTO: 4.99 MILLION/UL (ref 3.81–5.12)
SARS-COV-2 RNA RESP QL NAA+PROBE: NEGATIVE
SODIUM SERPL-SCNC: 144 MMOL/L (ref 136–145)
TROPONIN I SERPL-MCNC: <0.02 NG/ML
TROPONIN I SERPL-MCNC: <0.02 NG/ML
WBC # BLD AUTO: 7.21 THOUSAND/UL (ref 4.31–10.16)

## 2021-07-31 PROCEDURE — 96374 THER/PROPH/DIAG INJ IV PUSH: CPT

## 2021-07-31 PROCEDURE — 84484 ASSAY OF TROPONIN QUANT: CPT | Performed by: EMERGENCY MEDICINE

## 2021-07-31 PROCEDURE — 85610 PROTHROMBIN TIME: CPT

## 2021-07-31 PROCEDURE — 85379 FIBRIN DEGRADATION QUANT: CPT | Performed by: EMERGENCY MEDICINE

## 2021-07-31 PROCEDURE — 85730 THROMBOPLASTIN TIME PARTIAL: CPT

## 2021-07-31 PROCEDURE — 96375 TX/PRO/DX INJ NEW DRUG ADDON: CPT

## 2021-07-31 PROCEDURE — U0005 INFEC AGEN DETEC AMPLI PROBE: HCPCS | Performed by: EMERGENCY MEDICINE

## 2021-07-31 PROCEDURE — 99285 EMERGENCY DEPT VISIT HI MDM: CPT | Performed by: EMERGENCY MEDICINE

## 2021-07-31 PROCEDURE — 80053 COMPREHEN METABOLIC PANEL: CPT | Performed by: EMERGENCY MEDICINE

## 2021-07-31 PROCEDURE — U0003 INFECTIOUS AGENT DETECTION BY NUCLEIC ACID (DNA OR RNA); SEVERE ACUTE RESPIRATORY SYNDROME CORONAVIRUS 2 (SARS-COV-2) (CORONAVIRUS DISEASE [COVID-19]), AMPLIFIED PROBE TECHNIQUE, MAKING USE OF HIGH THROUGHPUT TECHNOLOGIES AS DESCRIBED BY CMS-2020-01-R: HCPCS | Performed by: EMERGENCY MEDICINE

## 2021-07-31 PROCEDURE — 85025 COMPLETE CBC W/AUTO DIFF WBC: CPT | Performed by: EMERGENCY MEDICINE

## 2021-07-31 PROCEDURE — G1004 CDSM NDSC: HCPCS

## 2021-07-31 PROCEDURE — 36415 COLL VENOUS BLD VENIPUNCTURE: CPT | Performed by: EMERGENCY MEDICINE

## 2021-07-31 PROCEDURE — 93005 ELECTROCARDIOGRAM TRACING: CPT

## 2021-07-31 PROCEDURE — 71045 X-RAY EXAM CHEST 1 VIEW: CPT

## 2021-07-31 PROCEDURE — 71275 CT ANGIOGRAPHY CHEST: CPT

## 2021-07-31 PROCEDURE — 99285 EMERGENCY DEPT VISIT HI MDM: CPT

## 2021-07-31 RX ORDER — ONDANSETRON 2 MG/ML
4 INJECTION INTRAMUSCULAR; INTRAVENOUS ONCE
Status: COMPLETED | OUTPATIENT
Start: 2021-07-31 | End: 2021-07-31

## 2021-07-31 RX ORDER — ASPIRIN 325 MG
325 TABLET ORAL DAILY
COMMUNITY

## 2021-07-31 RX ORDER — FENTANYL CITRATE 50 UG/ML
25 INJECTION, SOLUTION INTRAMUSCULAR; INTRAVENOUS ONCE
Status: COMPLETED | OUTPATIENT
Start: 2021-07-31 | End: 2021-07-31

## 2021-07-31 RX ORDER — NITROFURANTOIN MACROCRYSTALS 100 MG/1
100 CAPSULE ORAL 4 TIMES DAILY
COMMUNITY

## 2021-07-31 RX ADMIN — IOHEXOL 85 ML: 350 INJECTION, SOLUTION INTRAVENOUS at 18:27

## 2021-07-31 RX ADMIN — FENTANYL CITRATE 25 MCG: 50 INJECTION, SOLUTION INTRAMUSCULAR; INTRAVENOUS at 13:25

## 2021-07-31 RX ADMIN — ONDANSETRON 4 MG: 2 INJECTION INTRAMUSCULAR; INTRAVENOUS at 13:24

## 2021-07-31 NOTE — ED NOTES
Patient returned from CT at this time     [facility]  07/31/21 6064

## 2021-07-31 NOTE — ED NOTES
Patient transported to [facility], [address] [place] [address]  07/31/21 9770

## 2021-07-31 NOTE — ED NOTES
Xray at bedside     [facility], [address] [place] [address]  07/31/21 0718

## 2021-07-31 NOTE — DISCHARGE INSTRUCTIONS
Aleve 1-2 tabs twice daily with food OR ibuprofen 200-800mg every 8 hours with food as needed for pain   Tylenol 650mg every 6 hours as needed for pain, fever (max 3000mg in 24 hours)   Return with fever, trouble breathing, worsening or change in chest pain or any new or worsening symptoms

## 2021-07-31 NOTE — ED PROVIDER NOTES
History  Chief Complaint   Patient presents with    Chest Pain     intermittent chest pain reproducible with palpation  c/o cough      60-year-old female with history of hypertension prior stroke with spastic hemiplegia of the right side and expressive aphasia she is able to say yes or no and also history of CHF she presents with left-sided chest pain which started this morning with getting out of bed  She is denying any trauma or falls  The pain is left-sided and radiates under the left breast   Did have a small pleuritic component this morning but that has since resolved she has a slight cough is denying any fever chills has no upper respiratory complaints  She denies any upper or lower back pain no numbness or paresthesias no abdominal pain no nausea vomiting or diarrhea she was slightly lightheaded  The pain does seem to come and go but there is a constant component  Patient uses a wheelchair to get around she will she can not give it to transfer with helped applying weight to the left leg only  She had some left leg edema no prior history of DVT or PE  She was seen for right sided cp by her family doctor this resovled          Prior to Admission Medications   Prescriptions Last Dose Informant Patient Reported? Taking? Incontinence Supply Disposable (A + D Personal Care Wipes) MISC   No Yes   Sig: Use daily   Misc   Devices (Wheelchair Cushion) MISC   No No   Sig: by Does not apply route daily Combo cushion   aspirin 325 mg tablet 7/31/2021 at Unknown time  Yes Yes   Sig: Take 325 mg by mouth daily   atorvastatin (LIPITOR) 80 mg tablet 7/31/2021 at Unknown time  No Yes   Sig: TAKE 1 TABLET DAILY   cholecalciferol (VITAMIN D3) 1,000 units tablet  Self Yes No   Sig: Take 2,000 Units by mouth daily    escitalopram (LEXAPRO) 10 mg tablet 7/31/2021 at Unknown time  No Yes   Sig: TAKE 1 TABLET DAILY   ibuprofen (MOTRIN) 600 mg tablet   No Yes   Sig: TAKE 1 TABLET EVERY 8 HOURS AS NEEDED FOR MILD PAIN OR MODERATE PAIN FOR UP TO 5 DAYS   nitrofurantoin (MACRODANTIN) 100 mg capsule 2021 at Unknown time  Yes Yes   Sig: Take 100 mg by mouth 4 (four) times a day   potassium chloride (K-DUR,KLOR-CON) 20 mEq tablet 2021 at Unknown time  No Yes   Sig: Take 1 tablet (20 mEq total) by mouth daily      Facility-Administered Medications: None       Past Medical History:   Diagnosis Date    CV[facility]cerebral vascular accident) ([facility] )     CVA, old, aphasia     Depression     Hypertension     [facility] disease with [facility] failure ([facility] ) 2020    Medial meniscus tear     left    Mitral valve prolapse     Spastic hemiplegia of right dominant side as late effect of cerebral infarction ([facility] ) 2019    Stroke [facility])        Past Surgical History:   Procedure Laterality Date     SECTION      FRACTURE SURGERY      HYSTERECTOMY      PA OPEN RX FEMUR FX+INTRAMED ALAYNA Left 2017    Procedure: INSERTION NAIL IM FEMUR ANTEGRADE (TROCHANTERIC); Surgeon: Esau Curling, MD;  Location: [facility];  Service: Orthopedics    TONSILLECTOMY         Family History   Problem Relation Age of Onset    Arthritis Mother     Heart disease Father     Heart attack Father      I have reviewed and agree with the history as documented  E-Cigarette/Vaping    E-Cigarette Use Never User      E-Cigarette/Vaping Substances     Social History     Tobacco Use    Smoking status: Never Smoker    Smokeless tobacco: Never Used   Vaping Use    Vaping Use: Never used   Substance Use Topics    Alcohol use: Not Currently    Drug use: No       Review of Systems   Constitutional: Positive for activity change  Negative for appetite change, chills, diaphoresis and fever  HENT: Negative for congestion, ear pain, rhinorrhea, sneezing and sore throat  Eyes: Negative for discharge  Respiratory: Positive for cough  Negative for shortness of breath      Cardiovascular: Positive for chest pain and leg swelling (slight to left)  Gastrointestinal: Negative for abdominal pain, blood in stool, diarrhea, nausea and vomiting  Endocrine: Negative for polyuria  Genitourinary: Negative for difficulty urinating, dysuria, frequency and urgency  Musculoskeletal: Negative for back pain and myalgias  Skin: Negative for rash  Neurological: Negative for dizziness, weakness, light-headedness, numbness and headaches  Hematological: Negative for adenopathy  Psychiatric/Behavioral: Negative for confusion  All other systems reviewed and are negative  Physical Exam  Physical Exam  Vitals and nursing note reviewed  Constitutional:       General: She is not in acute distress  Appearance: She is well-developed and normal weight  She is not ill-appearing, toxic-appearing or diaphoretic  HENT:      Head: Normocephalic and atraumatic  Right Ear: Tympanic membrane and external ear normal       Left Ear: Tympanic membrane and external ear normal       Nose: Nose normal       Mouth/Throat:      Mouth: Mucous membranes are moist       Pharynx: No oropharyngeal exudate  Eyes:      General:         Right eye: No discharge  Left eye: No discharge  Extraocular Movements: Extraocular movements intact  Conjunctiva/sclera: Conjunctivae normal       Pupils: Pupils are equal, round, and reactive to light  Neck:      Comments: No midline or paraspinous tenderness  Cardiovascular:      Rate and Rhythm: Normal rate and regular rhythm  Pulses: Normal pulses  Pulmonary:      Effort: Pulmonary effort is normal  No respiratory distress  Breath sounds: Normal breath sounds  No stridor  No wheezing  Chest:      Chest wall: Tenderness (left parasternal region) present  Abdominal:      General: Bowel sounds are normal  There is no distension  Palpations: Abdomen is soft  Tenderness: There is no abdominal tenderness  There is no right CVA tenderness, left CVA tenderness, guarding or rebound  Comments: Back no midline or CVA tenderness   Musculoskeletal:         General: No tenderness or deformity  Normal range of motion  Cervical back: Normal range of motion and neck supple  Right lower leg: No edema  Left lower leg: No edema  Skin:     General: Skin is warm and dry  Neurological:      Mental Status: She is alert and oriented to person, place, and time  Mental status is at baseline  Cranial Nerves: No cranial nerve deficit  Sensory: No sensory deficit  Motor: Weakness (rt spastic hemiplegia) present  No abnormal muscle tone        Coordination: Coordination normal    Psychiatric:         Mood and Affect: Mood normal          Vital Signs  ED Triage Vitals [07/31/21 1210]   Temperature Pulse Respirations Blood Pressure SpO2   97 8 °F (36 6 °C) 72 20 142/78 94 %      Temp Source Heart Rate Source Patient Position - Orthostatic VS BP Location FiO2 (%)   Temporal Monitor Sitting Left arm --      Pain Score       4           Vitals:    07/31/21 1830 07/31/21 1900 07/31/21 1945 07/31/21 2018   BP: 118/58 120/56 118/62 121/58   Pulse: 89 77 77 75   Patient Position - Orthostatic VS: Sitting Sitting Sitting Sitting         Visual Acuity      ED Medications  Medications   ondansetron (ZOFRAN) injection 4 mg (4 mg Intravenous Given 7/31/21 1324)   fentanyl citrate (PF) 100 MCG/2ML 25 mcg (25 mcg Intravenous Given 7/31/21 1325)   iohexol (OMNIPAQUE) 350 MG/ML injection (SINGLE-DOSE) 85 mL (85 mL Intravenous Given 7/31/21 1827)       Diagnostic Studies  Results Reviewed     Procedure Component Value Units Date/Time    Troponin I [[id number]]  (Normal) Collected: 07/31/21 1508    Lab Status: Final result Specimen: Blood from Arm, Left Updated: 07/31/21 1532     Troponin I <0 02 ng/mL     Novel Coronavirus (Covid-19),PCR [facility] - 2 Hour Stat [[id number]]  (Normal) Collected: 07/31/21 1323    Lab Status: Final result Specimen: Nares from Nasopharyngeal Swab Updated: 07/31/21 1425 SARS-CoV-2 Negative    Narrative: The specimen collection materials, transport medium, and/or testing methodology utilized in the production of these test results have been proven to be reliable in a limited validation with an abbreviated program under the Emergency Utilization Authorization provided by the FDA  Testing reported as "Presumptive positive" will be confirmed with secondary testing to ensure result accuracy  Clinical caution and judgement should be used with the interpretation of these results with consideration of the clinical impression and other laboratory testing  Testing reported as "Positive" or "Negative" has been proven to be accurate according to standard laboratory validation requirements  All testing is performed with control materials showing appropriate reactivity at standard intervals        APTT [[id number]]  (Abnormal) Collected: 07/31/21 1324    Lab Status: Final result Specimen: Blood Updated: 07/31/21 1350     PTT 22 seconds     Protime-INR [[id number]]  (Normal) Collected: 07/31/21 1324    Lab Status: Final result Specimen: Blood Updated: 07/31/21 1350     Protime 12 1 seconds      INR 0 91    D-Dimer [[id number]]  (Abnormal) Collected: 07/31/21 1324    Lab Status: Final result Specimen: Blood from Arm, Left Updated: 07/31/21 1347     D-Dimer, Quant 1 17 ug/ml FEU     Comprehensive metabolic panel [[id number]]  (Abnormal) Collected: 07/31/21 1232    Lab Status: Final result Specimen: Blood from Arm, Left Updated: 07/31/21 1308     Sodium 144 mmol/L      Potassium 4 2 mmol/L      Chloride 106 mmol/L      CO2 30 mmol/L      ANION GAP 8 mmol/L      BUN 14 mg/dL      Creatinine 0 79 mg/dL      Glucose 100 mg/dL      Calcium 9 4 mg/dL      AST 25 U/L      ALT 31 U/L      Alkaline Phosphatase 156 U/L      Total Protein 8 3 g/dL      Albumin 3 8 g/dL      Total Bilirubin 0 57 mg/dL      eGFR 79 ml/min/1 73sq m     Narrative:      [place] guidelines for Chronic Kidney Disease (CKD):     Stage 1 with normal or high GFR (GFR > 90 mL/min/1 73 square meters)    Stage 2 Mild CKD (GFR = 60-89 mL/min/1 73 square meters)    Stage 3A Moderate CKD (GFR = 45-59 mL/min/1 73 square meters)    Stage 3B Moderate CKD (GFR = 30-44 mL/min/1 73 square meters)    Stage 4 Severe CKD (GFR = 15-29 mL/min/1 73 square meters)    Stage 5 End Stage CKD (GFR <15 mL/min/1 73 square meters)  Note: GFR calculation is accurate only with a steady state creatinine    Troponin I [[id number]]  (Normal) Collected: 07/31/21 1232    Lab Status: Final result Specimen: Blood from Arm, Left Updated: 07/31/21 1307     Troponin I <0 02 ng/mL     CBC and differential [[id number]]  (Abnormal) Collected: 07/31/21 1232    Lab Status: Final result Specimen: Blood from Arm, Left Updated: 07/31/21 1238     WBC 7 21 Thousand/uL      RBC 4 99 Million/uL      Hemoglobin 15 1 g/dL      Hematocrit 47 9 %      MCV 96 fL      MCH 30 3 pg      MCHC 31 5 g/dL      RDW 13 6 %      MPV 9 6 fL      Platelets 074 Thousands/uL      nRBC 0 /100 WBCs      Neutrophils Relative 58 %      Immat GRANS % 0 %      Lymphocytes Relative 35 %      Monocytes Relative 6 %      Eosinophils Relative 1 %      Basophils Relative 0 %      Neutrophils Absolute 4 13 Thousands/µL      Immature Grans Absolute 0 01 Thousand/uL      Lymphocytes Absolute 2 52 Thousands/µL      Monocytes Absolute 0 43 Thousand/µL      Eosinophils Absolute 0 09 Thousand/µL      Basophils Absolute 0 03 Thousands/µL                  CTA ED chest PE study   Final Result by Romelia Valderrama MD (07/31 1920)      1  No pulmonary embolism though subsegmental vessels at the bases are degraded by respiratory motion  2   Mild scattered groundglass densities, nonspecific  This may represent atelectasis or pulmonary edema                    Workstation performed: [id number]         XR chest 1 view portable   ED Interpretation by Dane Brooks MD (07/31 1300)   Read by me; Radiologist to provide formal interpretation   Poor insp effort no acute process no signif change vs  2/23/19                 Procedures  ECG 12 Lead Documentation Only    Date/Time: 7/31/2021 12:18 PM  Performed by: Nash Dubin, MD  Authorized by: Nash Dubin, MD     Indications / Diagnosis:  Cp  ECG reviewed by me, the ED Provider: yes    Patient location:  ED  Previous ECG:     Previous ECG:  Compared to current    Comparison ECG info:  2/25/19 14:37    Similarity:  No change  Rate:     ECG rate:  72    ECG rate assessment: normal    Rhythm:     Rhythm: sinus rhythm    QRS:     QRS axis:  Normal  Comments:      No acute ischemic changes  ECG 12 Lead Documentation Only    Date/Time: 7/31/2021 3:15 PM  Performed by: Nash Dubin, MD  Authorized by: Nash Dubin, MD     Indications / Diagnosis:  Recheck cp  ECG reviewed by me, the ED Provider: yes    Patient location:  ED  Previous ECG:     Previous ECG:  Compared to current    Comparison ECG info:  7/31/21 12:12    Similarity:  No change  Rate:     ECG rate:  72    ECG rate assessment: normal    Rhythm:     Rhythm: sinus rhythm    QRS:     QRS axis:  Normal  Comments:      No acute ischemic changes             ED Course  ED Course as of Jul 31 2125   Sat Jul 31, 2021   1531 Patient and family updated awaiting CTA chest      1829 Patient just completed CTA study      1928 Results reviewed no PE; no covid, no evidence of subendocardial ischemia on serial EKG will arrange for outpt stress test; suspect msk source recommended continued NSAIDS reviewed with patient and family/caregiviers                HEART Risk Score      Most Recent Value   Heart Score Risk Calculator   History  1 Filed at: 07/31/2021 1319   ECG  1 Filed at: 07/31/2021 1319   Age  2 Filed at: 07/31/2021 1319   Risk Factors  2 Filed at: 07/31/2021 1319   Troponin  0 Filed at: 07/31/2021 1319   HEART Score  6 Filed at: 07/31/2021 1319                      SBIRT 22yo+      Most Recent Value   SBIRT (22 yo +)   In order to provide better care to our patients, we are screening all of our patients for alcohol and drug use  Would it be okay to ask you these screening questions? Yes Filed at: 07/31/2021 1218   Initial Alcohol Screen: US AUDIT-C    1  How often do you have a drink containing alcohol?  0 Filed at: 07/31/2021 1218   2  How many drinks containing alcohol do you have on a typical day you are drinking? 0 Filed at: 07/31/2021 1218   3a  Male UNDER 65: How often do you have five or more drinks on one occasion? 0 Filed at: 07/31/2021 1218   3b  FEMALE Any Age, or MALE 65+: How often do you have 4 or more drinks on one occassion? 0 Filed at: 07/31/2021 1218   Audit-C Score  0 Filed at: 07/31/2021 1218   NO: How many times in the past year have you    Used an illegal drug or used a prescription medication for non-medical reasons? Never Filed at: 07/31/2021 1218          Wells' Criteria for PE      Most Recent Value   Wells' Criteria for PE   Clinical signs and symptoms of DVT  0 Filed at: 07/31/2021 1457   PE is primary diagnosis or equally likely  3 Filed at: 07/31/2021 1457   HR >100  0 Filed at: 07/31/2021 1457   Immobilization at least 3 days or Surgery in the previous 4 weeks  0 Filed at: 07/31/2021 1457   Previous, objectively diagnosed PE or DVT  0 Filed at: 07/31/2021 1457   Hemoptysis  0 Filed at: 07/31/2021 1457   Malignancy with treatment within 6 months or palliative  0 Filed at: 07/31/2021 1457   Wells' Criteria Total  3 Filed at: 07/31/2021 1457                MDM  Number of Diagnoses or Management Options  Diagnosis management comments: Mdm:  Atypical reproducible chest pain  With history of cough  Will check D-dimer to make sure not a pulmonary embolism and check for COVID  Evaluate for acute coronary syndrome if no other cause is apparent will undergo delta EKG and troponin to rule for to evaluate for subendocardial ischemia        Prev NM stress test 2/19 Stress results: Target heart rate was not achieved  There was no chest pain during stress  -  ECG conclusions: The stress ECG was negative for ischemia and normal   -  Perfusion imaging: There were no perfusion defects   -  Gated SPECT: The calculated left ventricular ejection fraction was 63 %  There was no left ventricular regional abnormality      IMPRESSIONS: Normal study after pharmacologic vasodilation without reproduction of symptoms  Myocardial perfusion imaging was normal at rest and with stress            Disposition  Final diagnoses:   Chest pain     Time reflects when diagnosis was documented in both MDM as applicable and the Disposition within this note     Time User Action Codes Description Comment    7/31/2021  7:32 PM Nargis Burch Add [R07 9] Chest pain       ED Disposition     ED Disposition Condition Date/Time Comment    Discharge Stable Sat Jul 31, 2021  7:38 PM Asya Alexandre discharge to home/self care              Follow-up Information     Follow up With Specialties Details Why [address] [place] [address], DO Family Medicine Call in 1 day recheck after stress test to review results [address] [facility]  [address]  [address] [place] [address]  [phone]            Discharge Medication List as of 7/31/2021  7:44 PM      CONTINUE these medications which have NOT CHANGED    Details   aspirin 325 mg tablet Take 325 mg by mouth daily, Historical Med      atorvastatin (LIPITOR) 80 mg tablet TAKE 1 TABLET DAILY, Normal      escitalopram (LEXAPRO) 10 mg tablet TAKE 1 TABLET DAILY, Normal      ibuprofen (MOTRIN) 600 mg tablet TAKE 1 TABLET EVERY 8 HOURS AS NEEDED FOR MILD PAIN OR MODERATE PAIN FOR UP TO 5 DAYS, Normal      Incontinence Supply Disposable (A + D Personal Care Wipes) MISC Use daily, Starting Thu 7/8/2021, Print      nitrofurantoin (MACRODANTIN) 100 mg capsule Take 100 mg by mouth 4 (four) times a day, Historical Med      potassium chloride (K-DUR,KLOR-CON) 20 mEq tablet Take 1 tablet (20 mEq total) by mouth daily, Starting Thu 1/21/2021, Normal      cholecalciferol (VITAMIN D3) 1,000 units tablet Take 2,000 Units by mouth daily , Historical Med      Misc  Devices (Wheelchair Cushion) [address] [facility] by Does not apply route daily Combo cushion, Starting Tue 10/27/2020, Print           Outpatient Discharge Orders   NM myocardial perfusion spect (rx stress and/or rest)   Standing Status: Future Standing Exp   Date: 07/31/25       PDMP Review     None          ED Provider  Electronically Signed by           Madisyn Santana MD  07/31/21 6933

## 2021-08-01 LAB
ATRIAL RATE: 72 BPM
ATRIAL RATE: 72 BPM
P AXIS: 35 DEGREES
P AXIS: 44 DEGREES
PR INTERVAL: 118 MS
PR INTERVAL: 122 MS
QRS AXIS: -13 DEGREES
QRS AXIS: -13 DEGREES
QRSD INTERVAL: 82 MS
QRSD INTERVAL: 90 MS
QT INTERVAL: 410 MS
QT INTERVAL: 414 MS
QTC INTERVAL: 448 MS
QTC INTERVAL: 453 MS
T WAVE AXIS: 50 DEGREES
T WAVE AXIS: 58 DEGREES
VENTRICULAR RATE: 72 BPM
VENTRICULAR RATE: 72 BPM

## 2021-08-01 PROCEDURE — 93010 ELECTROCARDIOGRAM REPORT: CPT | Performed by: INTERNAL MEDICINE

## 2021-09-24 ENCOUNTER — OFFICE VISIT (OUTPATIENT)
Dept: FAMILY MEDICINE CLINIC | Facility: CLINIC | Age: 66
End: 2021-09-24
Payer: COMMERCIAL

## 2021-09-24 VITALS
SYSTOLIC BLOOD PRESSURE: 132 MMHG | DIASTOLIC BLOOD PRESSURE: 66 MMHG | HEART RATE: 76 BPM | RESPIRATION RATE: 20 BRPM | TEMPERATURE: 97.5 F

## 2021-09-24 DIAGNOSIS — R47.01 EXPRESSIVE APHASIA: ICD-10-CM

## 2021-09-24 DIAGNOSIS — E55.9 VITAMIN D DEFICIENCY: ICD-10-CM

## 2021-09-24 DIAGNOSIS — Z74.09 IMPAIRED MOBILITY AND ACTIVITIES OF DAILY LIVING: ICD-10-CM

## 2021-09-24 DIAGNOSIS — Z86.73 H/O ISCHEMIC LEFT MCA STROKE: Primary | ICD-10-CM

## 2021-09-24 DIAGNOSIS — I69.351 SPASTIC HEMIPLEGIA OF RIGHT DOMINANT SIDE AS LATE EFFECT OF CEREBRAL INFARCTION (HCC): ICD-10-CM

## 2021-09-24 DIAGNOSIS — Z78.9 IMPAIRED MOBILITY AND ACTIVITIES OF DAILY LIVING: ICD-10-CM

## 2021-09-24 PROCEDURE — 99213 OFFICE O/P EST LOW 20 MIN: CPT | Performed by: FAMILY MEDICINE

## 2021-09-24 NOTE — PROGRESS NOTES
Assessment/Plan: status post massive CVA with right-sided deficit and aphasia  The patient has normal cognition  Dyslipidemia on atorvastatin 80 mg    Depression improved with Lexapro 10 mg    Impaired mobility and activities of daily living with spastic hemiplegia right dominant side as late effects of cerebral infarction the patient uses a wheelchair and would like an electric wheelchair will be referring to Physical Medicine Rehabilitation to assess the needs of this patient and hopefully supplying electric wheelchair if indicated    Problem List Items Addressed This Visit     None           There are no diagnoses linked to this encounter  No problem-specific Assessment & Plan notes found for this encounter  PHQ-9 Depression Screening    PHQ-9:   Frequency of the following problems over the past two weeks:      Little interest or pleasure in doing things: 0 - not at all  Feeling down, depressed, or hopeless: 0 - not at all  PHQ-2 Score: 0          There is no height or weight on file to calculate BMI  BMI Counseling: There is no height or weight on file to calculate BMI  The BMI     Subjective:      Patient ID: Cecily Valiente is a 72 y o  female  Patient presents for four-month checkup      The following portions of the patient's history were reviewed and updated as appropriate:   She has a past medical history of CVA (cerebral vascular accident) ([facility] ), CVA, old, aphasia, Depression, Hypertension, Hypertensive heart disease with congestive heart failure ([address] ) (2020), Medial meniscus tear, Mitral valve prolapse, Spastic hemiplegia of right dominant side as late effect of cerebral infarction ([facility] ) (2019), and Stroke ([facility] )  ,  does not have any pertinent problems on file  ,   has a past surgical history that includes Hysterectomy;  section; Tonsillectomy; Fracture surgery; and pr open rx femur fx+intramed rodriguez (Left, 2017)  ,  family history includes Arthritis in her mother; Heart attack in her father; Heart disease in her father  ,   reports that she has never smoked  She has never used smokeless tobacco  She reports previous alcohol use  She reports that she does not use drugs  ,  has No Known Allergies     Current Outpatient Medications   Medication Sig Dispense Refill    aspirin 325 mg tablet Take 325 mg by mouth daily      atorvastatin (LIPITOR) 80 mg tablet TAKE 1 TABLET DAILY 90 tablet 3    cholecalciferol (VITAMIN D3) 1,000 units tablet Take 2,000 Units by mouth daily       escitalopram (LEXAPRO) 10 mg tablet TAKE 1 TABLET DAILY 90 tablet 3    ibuprofen (MOTRIN) 600 mg tablet TAKE 1 TABLET EVERY 8 HOURS AS NEEDED FOR MILD PAIN OR MODERATE PAIN FOR UP TO 5 DAYS 270 tablet 3    Incontinence Supply Disposable (A + D Personal Care Wipes) MISC Use daily 2 each 11    Misc  Devices (Wheelchair Cushion) [address] [facility] by Does not apply route daily Combo cushion 1 each 0    nitrofurantoin (MACRODANTIN) 100 mg capsule Take 100 mg by mouth 4 (four) times a day      potassium chloride (K-DUR,KLOR-CON) 20 mEq tablet Take 1 tablet (20 mEq total) by mouth daily 90 tablet 1     No current facility-administered medications for this visit  Review of Systems   Constitutional: Negative for chills and fever  HENT: Negative for ear pain and sore throat  Eyes: Negative for pain and visual disturbance  Respiratory: Negative for cough and shortness of breath  Cardiovascular: Negative for chest pain and palpitations  Gastrointestinal: Negative for abdominal pain and vomiting  Genitourinary: Negative for dysuria and hematuria  Musculoskeletal: Positive for gait problem  Negative for arthralgias and back pain  Right-sided hemiplegia   Skin: Negative for color change and rash  Neurological: Negative for seizures and syncope  Aphasic with normal cognition   All other systems reviewed and are negative          Objective:    /66   Pulse 76   Temp 97 5 °F (36 4 °C) Resp 20   LMP  (LMP Unknown)   There is no height or weight on file to calculate BMI  Physical Exam  Constitutional:       Appearance: She is well-developed  HENT:      Head: Normocephalic  Eyes:      Pupils: Pupils are equal, round, and reactive to light  Cardiovascular:      Rate and Rhythm: Normal rate and regular rhythm  Heart sounds: Normal heart sounds  Pulmonary:      Effort: Pulmonary effort is normal       Breath sounds: Normal breath sounds  Abdominal:      General: Bowel sounds are normal       Palpations: Abdomen is soft  Tenderness: There is no abdominal tenderness  Musculoskeletal:      Cervical back: Normal range of motion  Skin:     General: Skin is warm  Neurological:      Mental Status: She is alert and oriented to person, place, and time        Comments: Right-sided hemiplegia

## 2022-02-22 ENCOUNTER — TELEPHONE (OUTPATIENT)
Dept: OTHER | Facility: OTHER | Age: 67
End: 2022-02-22

## 2022-02-22 NOTE — TELEPHONE ENCOUNTER
Maryjo Hicks [facility] [phone]     Is requesting a call back to reschedule the appointment      Name: Michelle Li MRN: [id number]   Date: 1/25/2022 Status: No Show   Time: 11:00 AM Length: 30   Visit Type: PHYSICAL PG [[id number]] Copay: $0 00   Provider: Tiana Figueroa DO Department: PG [facility] PRIMARY CARE   Bill Area:  Department Phone: [phone]

## 2022-02-28 ENCOUNTER — APPOINTMENT (OUTPATIENT)
Dept: LAB | Facility: HOSPITAL | Age: 67
End: 2022-02-28
Attending: FAMILY MEDICINE
Payer: COMMERCIAL

## 2022-02-28 ENCOUNTER — OFFICE VISIT (OUTPATIENT)
Dept: FAMILY MEDICINE CLINIC | Facility: CLINIC | Age: 67
End: 2022-02-28
Payer: COMMERCIAL

## 2022-02-28 VITALS
HEART RATE: 68 BPM | DIASTOLIC BLOOD PRESSURE: 84 MMHG | RESPIRATION RATE: 16 BRPM | TEMPERATURE: 98.1 F | SYSTOLIC BLOOD PRESSURE: 134 MMHG

## 2022-02-28 DIAGNOSIS — E55.9 VITAMIN D DEFICIENCY: ICD-10-CM

## 2022-02-28 DIAGNOSIS — I69.398 DEPRESSION AS LATE EFFECT OF CEREBROVASCULAR ACCIDENT (CVA): ICD-10-CM

## 2022-02-28 DIAGNOSIS — N30.90 CYSTITIS: Primary | ICD-10-CM

## 2022-02-28 DIAGNOSIS — I11.0 HYPERTENSIVE HEART DISEASE WITH CONGESTIVE HEART FAILURE, UNSPECIFIED HEART FAILURE TYPE (HCC): ICD-10-CM

## 2022-02-28 DIAGNOSIS — Z11.4 SCREENING FOR HIV (HUMAN IMMUNODEFICIENCY VIRUS): ICD-10-CM

## 2022-02-28 DIAGNOSIS — Z00.00 ANNUAL PHYSICAL EXAM: ICD-10-CM

## 2022-02-28 DIAGNOSIS — F06.31 DEPRESSION AS LATE EFFECT OF CEREBROVASCULAR ACCIDENT (CVA): ICD-10-CM

## 2022-02-28 DIAGNOSIS — E78.2 MIXED HYPERLIPIDEMIA: ICD-10-CM

## 2022-02-28 DIAGNOSIS — I69.351 SPASTIC HEMIPLEGIA OF RIGHT DOMINANT SIDE AS LATE EFFECT OF CEREBRAL INFARCTION (HCC): ICD-10-CM

## 2022-02-28 DIAGNOSIS — Z11.59 NEED FOR HEPATITIS C SCREENING TEST: ICD-10-CM

## 2022-02-28 LAB
BACTERIA UR QL AUTO: ABNORMAL /HPF
BILIRUB UR QL STRIP: NEGATIVE
CHOLEST SERPL-MCNC: 158 MG/DL
CLARITY UR: ABNORMAL
COLOR UR: YELLOW
GLUCOSE UR STRIP-MCNC: NEGATIVE MG/DL
HBV CORE AB SER QL: NORMAL
HBV SURFACE AB SER-ACNC: 251.86 MIU/ML
HBV SURFACE AG SER QL: NORMAL
HCV AB SER QL: NORMAL
HDLC SERPL-MCNC: 68 MG/DL
HGB UR QL STRIP.AUTO: ABNORMAL
KETONES UR STRIP-MCNC: NEGATIVE MG/DL
LDLC SERPL CALC-MCNC: 62 MG/DL (ref 0–100)
LEUKOCYTE ESTERASE UR QL STRIP: ABNORMAL
NITRITE UR QL STRIP: POSITIVE
NON-SQ EPI CELLS URNS QL MICRO: ABNORMAL /HPF
NONHDLC SERPL-MCNC: 90 MG/DL
PH UR STRIP.AUTO: 5.5 [PH]
PROT UR STRIP-MCNC: NEGATIVE MG/DL
RBC #/AREA URNS AUTO: ABNORMAL /HPF
SP GR UR STRIP.AUTO: 1.02 (ref 1–1.03)
TRIGL SERPL-MCNC: 140 MG/DL
TSH SERPL DL<=0.05 MIU/L-ACNC: 2.73 UIU/ML (ref 0.36–3.74)
UROBILINOGEN UR QL STRIP.AUTO: 0.2 E.U./DL
WBC #/AREA URNS AUTO: ABNORMAL /HPF

## 2022-02-28 PROCEDURE — 87077 CULTURE AEROBIC IDENTIFY: CPT | Performed by: FAMILY MEDICINE

## 2022-02-28 PROCEDURE — 87389 HIV-1 AG W/HIV-1&-2 AB AG IA: CPT

## 2022-02-28 PROCEDURE — 87086 URINE CULTURE/COLONY COUNT: CPT | Performed by: FAMILY MEDICINE

## 2022-02-28 PROCEDURE — 80061 LIPID PANEL: CPT

## 2022-02-28 PROCEDURE — 36415 COLL VENOUS BLD VENIPUNCTURE: CPT

## 2022-02-28 PROCEDURE — 87340 HEPATITIS B SURFACE AG IA: CPT

## 2022-02-28 PROCEDURE — 99397 PER PM REEVAL EST PAT 65+ YR: CPT | Performed by: FAMILY MEDICINE

## 2022-02-28 PROCEDURE — 86704 HEP B CORE ANTIBODY TOTAL: CPT

## 2022-02-28 PROCEDURE — 86803 HEPATITIS C AB TEST: CPT

## 2022-02-28 PROCEDURE — 99213 OFFICE O/P EST LOW 20 MIN: CPT | Performed by: FAMILY MEDICINE

## 2022-02-28 PROCEDURE — 84443 ASSAY THYROID STIM HORMONE: CPT

## 2022-02-28 PROCEDURE — 82306 VITAMIN D 25 HYDROXY: CPT

## 2022-02-28 PROCEDURE — 87186 SC STD MICRODIL/AGAR DIL: CPT | Performed by: FAMILY MEDICINE

## 2022-02-28 PROCEDURE — 81001 URINALYSIS AUTO W/SCOPE: CPT | Performed by: FAMILY MEDICINE

## 2022-02-28 PROCEDURE — 86706 HEP B SURFACE ANTIBODY: CPT

## 2022-02-28 RX ORDER — CIPROFLOXACIN 250 MG/1
250 TABLET, FILM COATED ORAL EVERY 12 HOURS SCHEDULED
Qty: 10 TABLET | Refills: 0 | Status: SHIPPED | OUTPATIENT
Start: 2022-02-28 | End: 2022-02-28

## 2022-02-28 RX ORDER — ESCITALOPRAM OXALATE 10 MG/1
10 TABLET ORAL DAILY
Qty: 90 TABLET | Refills: 1 | Status: SHIPPED | OUTPATIENT
Start: 2022-02-28

## 2022-02-28 RX ORDER — ATORVASTATIN CALCIUM 80 MG/1
80 TABLET, FILM COATED ORAL DAILY
Qty: 90 TABLET | Refills: 1 | Status: SHIPPED | OUTPATIENT
Start: 2022-02-28

## 2022-02-28 RX ORDER — POTASSIUM CHLORIDE 20 MEQ/1
20 TABLET, EXTENDED RELEASE ORAL DAILY
Qty: 90 TABLET | Refills: 1 | Status: SHIPPED | OUTPATIENT
Start: 2022-02-28

## 2022-02-28 RX ORDER — CIPROFLOXACIN 250 MG/1
250 TABLET, FILM COATED ORAL EVERY 12 HOURS SCHEDULED
Qty: 10 TABLET | Refills: 0 | Status: SHIPPED | OUTPATIENT
Start: 2022-02-28 | End: 2022-03-02 | Stop reason: ALTCHOICE

## 2022-02-28 NOTE — PROGRESS NOTES
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    Dyslipidemia on atorvastatin 80 mg daily    Depression improved with Lexapro 10 mg         Problem List Items Addressed This Visit        Cardiovascular and Mediastinum    Hypertensive heart disease with congestive heart failure ([address] )      Other Visit Diagnoses     Mixed hyperlipidemia        Depression as late effect of cerebrovascular accident (CVA)              Immunizations and preventive care screenings were discussed with patient today  Appropriate education was printed on patient's after visit summary  Counseling:  Alcohol/drug use: discussed moderation in alcohol intake, the recommendations for healthy alcohol use, and avoidance of illicit drug use  · Dental Health: discussed importance of regular tooth brushing, flossing, and dental visits  BMI Counseling: There is no height or weight on file to calculate BMI  The BMI is above normal  Nutrition recommendations include decreasing portion sizes, encouraging healthy choices of fruits and vegetables, decreasing fast food intake, consuming healthier snacks, limiting drinks that contain sugar, moderation in carbohydrate intake, increasing intake of lean protein, reducing intake of saturated and trans fat and reducing intake of cholesterol  Rationale for BMI follow-up plan is due to patient being overweight or obese  No follow-ups on file  Chief Complaint:     Chief Complaint   Patient presents with    Physical Exam    Urinary Tract Infection     burning on urination x 3 days , urinary frequency   Patient request medications to [facility]    Medication Refill     Med refills to [facility]      History of Present Illness:     Adult Annual Physical   Patient here for a comprehensive physical exam  The patient reports problems - Possibly urinary tract infection  Diet and Physical Activity  · Diet/Nutrition: well balanced diet  · Exercise: no formal exercise  Depression Screening  PHQ-2/9 Depression Screening         General Health  · Sleep: sleeps well  · Hearing: normal - bilateral   · Vision: no vision problems  · Dental: regular dental visits and brushes teeth once daily  /GYN Health  · Patient is: postmenopausal  · Last menstrual period: yrs  · Contraceptive method: Menopausal      Review of Systems:     Review of Systems   Constitutional: Negative for chills and fever  HENT: Negative for ear pain and sore throat  Eyes: Negative for pain and visual disturbance  Respiratory: Negative for cough and shortness of breath  Cardiovascular: Negative for chest pain and palpitations  Gastrointestinal: Negative for abdominal pain and vomiting  Genitourinary: Positive for dysuria, frequency and urgency  Negative for hematuria  Musculoskeletal: Negative for arthralgias and back pain  Skin: Negative for color change and rash  Neurological: Negative for seizures and syncope  Right sylvester deficit wheelchair-bound   All other systems reviewed and are negative       Past Medical History:     Past Medical History:   Diagnosis Date    [facility]     CVA, old, aphasia     Depression     Hypertension     Hypertensive heart disease with congestive heart failure ([facility] ) 2020    Medial meniscus tear     left    Mitral valve prolapse     Spastic hemiplegia of right dominant side as late effect of cerebral infarction ([facility] ) 2019    Stroke [facility])       Past Surgical History:     Past Surgical History:   Procedure Laterality Date     SECTION      FRACTURE SURGERY      HYSTERECTOMY      [place] OPEN RX FEMUR FX+INTRAMED ALAYNA Left 1/14/2017    Procedure: INSERTION NAIL IM FEMUR ANTEGRADE (TROCHANTERIC); Surgeon: Billie Julian MD;  [facility];  Service: Orthopedics    TONSILLECTOMY        Social History:     Social History     Socioeconomic History    Marital status:      Spouse name: None    Number of children: None    Years of education: None    Highest education level: None   Occupational History    None   Tobacco Use    Smoking status: Never Smoker    Smokeless tobacco: Never Used   Vaping Use    Vaping Use: Never used   Substance and Sexual Activity    Alcohol use: Not Currently    Drug use: No    Sexual activity: Not Currently   Other Topics Concern    None   Social History Narrative    None     Social Determinants of Health     Financial Resource Strain: Not on file   Food Insecurity: Not on file   Transportation Needs: Not on file   Physical Activity: Not on file   Stress: Not on file   Social Connections: Not on file   Intimate Partner Violence: Not on file   Housing Stability: Not on file      Family History:     Family History   Problem Relation Age of Onset    Arthritis Mother     Heart disease Father     Heart attack Father       Current Medications:     Current Outpatient Medications   Medication Sig Dispense Refill    aspirin 325 mg tablet Take 325 mg by mouth daily      atorvastatin (LIPITOR) 80 mg tablet TAKE 1 TABLET DAILY 90 tablet 3    cholecalciferol (VITAMIN D3) 1,000 units tablet Take 2,000 Units by mouth daily       escitalopram (LEXAPRO) 10 mg tablet TAKE 1 TABLET DAILY 90 tablet 3    ibuprofen (MOTRIN) 600 mg tablet TAKE 1 TABLET EVERY 8 HOURS AS NEEDED FOR MILD PAIN OR MODERATE PAIN FOR UP TO 5 DAYS 270 tablet 3    Incontinence Supply Disposable (A + D Personal Care Wipes) MISC Use daily 2 each 11    Klor-Con M20 20 MEQ tablet TAKE 1 TABLET DAILY 90 tablet 1    Misc   Devices (Wheelchair Cushion) [address] [facility] by Does not apply route daily Combo cushion 1 each 0    nitrofurantoin (MACRODANTIN) 100 mg capsule Take 100 mg by mouth 4 (four) times a day       No current facility-administered medications for this visit  Allergies:     No Known Allergies   Physical Exam:     /84   Pulse 68   Temp 98 1 °F (36 7 °C)   Resp 16   LMP  (LMP Unknown)     Physical Exam  Vitals and nursing note reviewed  Constitutional:       General: She is not in acute distress  Appearance: She is well-developed  She is obese  HENT:      Head: Normocephalic and atraumatic  Eyes:      Conjunctiva/sclera: Conjunctivae normal    Cardiovascular:      Rate and Rhythm: Normal rate and regular rhythm  Heart sounds: No murmur heard  Pulmonary:      Effort: Pulmonary effort is normal  No respiratory distress  Breath sounds: Normal breath sounds  Abdominal:      Palpations: Abdomen is soft  Tenderness: There is no abdominal tenderness  Musculoskeletal:      Cervical back: Neck supple  Comments: Right sylvester deficit with contractures   Skin:     General: Skin is warm and dry  Neurological:      Mental Status: She is alert            Jayesh Roberson DO  [facility] PRIMARY CARE

## 2022-02-28 NOTE — PATIENT INSTRUCTIONS

## 2022-03-01 LAB
25(OH)D3 SERPL-MCNC: 42.1 NG/ML (ref 30–100)
HIV 1+2 AB+HIV1 P24 AG SERPL QL IA: NORMAL

## 2022-03-02 DIAGNOSIS — N30.90 CYSTITIS: Primary | ICD-10-CM

## 2022-03-02 LAB — BACTERIA UR CULT: ABNORMAL

## 2022-03-02 RX ORDER — AMPICILLIN 500 MG/1
500 CAPSULE ORAL 4 TIMES DAILY
Qty: 20 CAPSULE | Refills: 0 | Status: SHIPPED | OUTPATIENT
Start: 2022-03-02 | End: 2022-03-07

## 2022-04-25 ENCOUNTER — TELEPHONE (OUTPATIENT)
Dept: FAMILY MEDICINE CLINIC | Facility: CLINIC | Age: 67
End: 2022-04-25

## 2022-04-25 DIAGNOSIS — R21 RASH: Primary | ICD-10-CM

## 2022-04-25 RX ORDER — FLUCONAZOLE 150 MG/1
150 TABLET ORAL WEEKLY
Qty: 4 TABLET | Refills: 0 | Status: SHIPPED | OUTPATIENT
Start: 2022-04-25 | End: 2022-04-26

## 2022-04-25 NOTE — TELEPHONE ENCOUNTER
Caretaker reports "red rashy in her folds of her stomach and between legs", she has tried ointments and powders OTC with no success, is there anything you can order for her orally

## 2022-04-26 RX ORDER — FLUCONAZOLE 150 MG/1
150 TABLET ORAL WEEKLY
Qty: 4 TABLET | Refills: 0 | Status: SHIPPED | OUTPATIENT
Start: 2022-04-26 | End: 2022-05-26

## 2022-04-29 ENCOUNTER — APPOINTMENT (EMERGENCY)
Dept: CT IMAGING | Facility: HOSPITAL | Age: 67
End: 2022-04-29
Payer: COMMERCIAL

## 2022-04-29 ENCOUNTER — HOSPITAL ENCOUNTER (EMERGENCY)
Facility: HOSPITAL | Age: 67
Discharge: HOME | End: 2022-04-29
Attending: EMERGENCY MEDICINE | Admitting: EMERGENCY MEDICINE
Payer: COMMERCIAL

## 2022-04-29 VITALS
RESPIRATION RATE: 20 BRPM | HEART RATE: 95 BPM | SYSTOLIC BLOOD PRESSURE: 158 MMHG | DIASTOLIC BLOOD PRESSURE: 82 MMHG | OXYGEN SATURATION: 95 % | TEMPERATURE: 97.8 F

## 2022-04-29 DIAGNOSIS — R93.5 ABNORMAL CT OF THE ABDOMEN: ICD-10-CM

## 2022-04-29 DIAGNOSIS — R11.2 NAUSEA AND VOMITING: Primary | ICD-10-CM

## 2022-04-29 DIAGNOSIS — R10.9 ABDOMINAL PAIN: ICD-10-CM

## 2022-04-29 LAB
ALBUMIN SERPL BCP-MCNC: 3.6 G/DL (ref 3.5–5)
ALP SERPL-CCNC: 155 U/L (ref 46–116)
ALT SERPL W P-5'-P-CCNC: 20 U/L (ref 12–78)
ANION GAP SERPL CALCULATED.3IONS-SCNC: 11 MMOL/L (ref 4–13)
APTT PPP: 23 SECONDS (ref 23–37)
AST SERPL W P-5'-P-CCNC: 20 U/L (ref 5–45)
BASOPHILS # BLD AUTO: 0.06 THOUSANDS/ΜL (ref 0–0.1)
BASOPHILS NFR BLD AUTO: 0 % (ref 0–1)
BILIRUB DIRECT SERPL-MCNC: 0.06 MG/DL (ref 0–0.2)
BILIRUB SERPL-MCNC: 0.3 MG/DL (ref 0.2–1)
BILIRUB UR QL STRIP: NEGATIVE
BUN SERPL-MCNC: 21 MG/DL (ref 5–25)
CALCIUM SERPL-MCNC: 9.2 MG/DL (ref 8.3–10.1)
CARDIAC TROPONIN I PNL SERPL HS: 2 NG/L (ref 8–18)
CHLORIDE SERPL-SCNC: 103 MMOL/L (ref 100–108)
CLARITY UR: CLEAR
CO2 SERPL-SCNC: 24 MMOL/L (ref 21–32)
COLOR UR: YELLOW
CREAT SERPL-MCNC: 0.91 MG/DL (ref 0.6–1.3)
EOSINOPHIL # BLD AUTO: 0.04 THOUSAND/ΜL (ref 0–0.61)
EOSINOPHIL NFR BLD AUTO: 0 % (ref 0–6)
ERYTHROCYTE [DISTWIDTH] IN BLOOD BY AUTOMATED COUNT: 13 % (ref 11.6–15.1)
GFR SERPL CREATININE-BSD FRML MDRD: 65 ML/MIN/1.73SQ M
GLUCOSE SERPL-MCNC: 116 MG/DL (ref 65–140)
GLUCOSE UR STRIP-MCNC: NEGATIVE MG/DL
HCT VFR BLD AUTO: 42.6 % (ref 34.8–46.1)
HGB BLD-MCNC: 13.7 G/DL (ref 11.5–15.4)
HGB UR QL STRIP.AUTO: NEGATIVE
IMM GRANULOCYTES # BLD AUTO: 0.04 THOUSAND/UL (ref 0–0.2)
IMM GRANULOCYTES NFR BLD AUTO: 0 % (ref 0–2)
INR PPP: 0.9 (ref 0.84–1.19)
KETONES UR STRIP-MCNC: NEGATIVE MG/DL
LACTATE SERPL-SCNC: 1.7 MMOL/L (ref 0.5–2)
LEUKOCYTE ESTERASE UR QL STRIP: NEGATIVE
LIPASE SERPL-CCNC: 123 U/L (ref 73–393)
LYMPHOCYTES # BLD AUTO: 2.91 THOUSANDS/ΜL (ref 0.6–4.47)
LYMPHOCYTES NFR BLD AUTO: 20 % (ref 14–44)
MAGNESIUM SERPL-MCNC: 2.3 MG/DL (ref 1.6–2.6)
MCH RBC QN AUTO: 30.2 PG (ref 26.8–34.3)
MCHC RBC AUTO-ENTMCNC: 32.2 G/DL (ref 31.4–37.4)
MCV RBC AUTO: 94 FL (ref 82–98)
MONOCYTES # BLD AUTO: 0.65 THOUSAND/ΜL (ref 0.17–1.22)
MONOCYTES NFR BLD AUTO: 4 % (ref 4–12)
NEUTROPHILS # BLD AUTO: 11.18 THOUSANDS/ΜL (ref 1.85–7.62)
NEUTS SEG NFR BLD AUTO: 76 % (ref 43–75)
NITRITE UR QL STRIP: NEGATIVE
NRBC BLD AUTO-RTO: 0 /100 WBCS
PH UR STRIP.AUTO: 7.5 [PH]
PLATELET # BLD AUTO: 347 THOUSANDS/UL (ref 149–390)
PMV BLD AUTO: 10.7 FL (ref 8.9–12.7)
POTASSIUM SERPL-SCNC: 3.9 MMOL/L (ref 3.5–5.3)
PROT SERPL-MCNC: 7.6 G/DL (ref 6.4–8.2)
PROT UR STRIP-MCNC: NEGATIVE MG/DL
PROTHROMBIN TIME: 11.7 SECONDS (ref 11.6–14.5)
RBC # BLD AUTO: 4.53 MILLION/UL (ref 3.81–5.12)
SODIUM SERPL-SCNC: 138 MMOL/L (ref 136–145)
SP GR UR STRIP.AUTO: 1.01 (ref 1–1.03)
UROBILINOGEN UR QL STRIP.AUTO: 0.2 E.U./DL
WBC # BLD AUTO: 14.88 THOUSAND/UL (ref 4.31–10.16)

## 2022-04-29 PROCEDURE — 80076 HEPATIC FUNCTION PANEL: CPT | Performed by: EMERGENCY MEDICINE

## 2022-04-29 PROCEDURE — 81003 URINALYSIS AUTO W/O SCOPE: CPT | Performed by: EMERGENCY MEDICINE

## 2022-04-29 PROCEDURE — 93005 ELECTROCARDIOGRAM TRACING: CPT

## 2022-04-29 PROCEDURE — 84484 ASSAY OF TROPONIN QUANT: CPT | Performed by: EMERGENCY MEDICINE

## 2022-04-29 PROCEDURE — 85610 PROTHROMBIN TIME: CPT | Performed by: EMERGENCY MEDICINE

## 2022-04-29 PROCEDURE — 96365 THER/PROPH/DIAG IV INF INIT: CPT

## 2022-04-29 PROCEDURE — 96375 TX/PRO/DX INJ NEW DRUG ADDON: CPT

## 2022-04-29 PROCEDURE — 96366 THER/PROPH/DIAG IV INF ADDON: CPT

## 2022-04-29 PROCEDURE — G1004 CDSM NDSC: HCPCS

## 2022-04-29 PROCEDURE — 99284 EMERGENCY DEPT VISIT MOD MDM: CPT

## 2022-04-29 PROCEDURE — 99285 EMERGENCY DEPT VISIT HI MDM: CPT | Performed by: EMERGENCY MEDICINE

## 2022-04-29 PROCEDURE — 85025 COMPLETE CBC W/AUTO DIFF WBC: CPT | Performed by: EMERGENCY MEDICINE

## 2022-04-29 PROCEDURE — 83605 ASSAY OF LACTIC ACID: CPT | Performed by: EMERGENCY MEDICINE

## 2022-04-29 PROCEDURE — 83735 ASSAY OF MAGNESIUM: CPT | Performed by: EMERGENCY MEDICINE

## 2022-04-29 PROCEDURE — 85730 THROMBOPLASTIN TIME PARTIAL: CPT | Performed by: EMERGENCY MEDICINE

## 2022-04-29 PROCEDURE — 74177 CT ABD & PELVIS W/CONTRAST: CPT

## 2022-04-29 PROCEDURE — 36415 COLL VENOUS BLD VENIPUNCTURE: CPT | Performed by: EMERGENCY MEDICINE

## 2022-04-29 PROCEDURE — 80048 BASIC METABOLIC PNL TOTAL CA: CPT | Performed by: EMERGENCY MEDICINE

## 2022-04-29 PROCEDURE — 83690 ASSAY OF LIPASE: CPT | Performed by: EMERGENCY MEDICINE

## 2022-04-29 RX ORDER — ONDANSETRON 4 MG/1
4 TABLET, ORALLY DISINTEGRATING ORAL EVERY 8 HOURS PRN
Qty: 20 TABLET | Refills: 0 | Status: SHIPPED | OUTPATIENT
Start: 2022-04-29

## 2022-04-29 RX ORDER — ONDANSETRON 2 MG/ML
4 INJECTION INTRAMUSCULAR; INTRAVENOUS ONCE
Status: COMPLETED | OUTPATIENT
Start: 2022-04-29 | End: 2022-04-29

## 2022-04-29 RX ORDER — FAMOTIDINE 10 MG/ML
20 INJECTION, SOLUTION INTRAVENOUS ONCE
Status: COMPLETED | OUTPATIENT
Start: 2022-04-29 | End: 2022-04-29

## 2022-04-29 RX ORDER — ONDANSETRON 4 MG/1
4 TABLET, ORALLY DISINTEGRATING ORAL EVERY 8 HOURS PRN
Qty: 20 TABLET | Refills: 0 | Status: SHIPPED | OUTPATIENT
Start: 2022-04-29 | End: 2022-04-29

## 2022-04-29 RX ADMIN — FAMOTIDINE 20 MG: 10 INJECTION INTRAVENOUS at 20:54

## 2022-04-29 RX ADMIN — IOHEXOL 100 ML: 350 INJECTION, SOLUTION INTRAVENOUS at 22:14

## 2022-04-29 RX ADMIN — SODIUM CHLORIDE, SODIUM LACTATE, POTASSIUM CHLORIDE, AND CALCIUM CHLORIDE 1000 ML: .6; .31; .03; .02 INJECTION, SOLUTION INTRAVENOUS at 20:53

## 2022-04-29 RX ADMIN — ONDANSETRON 4 MG: 2 INJECTION INTRAMUSCULAR; INTRAVENOUS at 20:54

## 2022-04-30 NOTE — ED PROVIDER NOTES
History  Chief Complaint   Patient presents with    Vomiting     ems states patient was "out to eat today" and has been vomiting  hx stroke, deficit to R side  patient nonverbal       78 yo female With history of CVA with spastic right hemiplegia hypertension presents with vomiting since going out to eat earlier this afternoon  No one else has been ill  Patient is able to communicate with yes or no  Patient denies any headache or lightheadedness she has no chest pain shortness of breath no fever or chills  She she did have an emesis in route she initially had some abdominal pain but this has improved  She denies any diarrhea she has had no dysuria increased urinary frequency there is no history of fever chills cough or upper respiratory complaints  Prior to Admission Medications   Prescriptions Last Dose Informant Patient Reported? Taking? Incontinence Supply Disposable (A + D Personal Care Wipes) MISC   No No   Sig: Use daily   Misc   Devices (Wheelchair Cushion) MISC   No No   Sig: by Does not apply route daily Combo cushion   aspirin 325 mg tablet   Yes No   Sig: Take 325 mg by mouth daily   atorvastatin (LIPITOR) 80 mg tablet   No No   Sig: Take 1 tablet (80 mg total) by mouth daily   cholecalciferol (VITAMIN D3) 1,000 units tablet  Self Yes No   Sig: Take 2,000 Units by mouth daily    escitalopram (LEXAPRO) 10 mg tablet   No No   Sig: Take 1 tablet (10 mg total) by mouth daily   fluconazole (DIFLUCAN) 150 mg tablet   No No   Sig: Take 1 tablet (150 mg total) by mouth once a week   ibuprofen (MOTRIN) 600 mg tablet   No No   Sig: TAKE 1 TABLET EVERY 8 HOURS AS NEEDED FOR MILD PAIN OR MODERATE PAIN FOR UP TO 5 DAYS   nitrofurantoin (MACRODANTIN) 100 mg capsule   Yes No   Sig: Take 100 mg by mouth 4 (four) times a day   potassium chloride (Klor-Con M20) 20 mEq tablet   No No   Sig: Take 1 tablet (20 mEq total) by mouth daily      Facility-Administered Medications: None       Past Medical History: Diagnosis Date    [facility]re[facility] [address] [facility] )     CVA, old, aphasia     Depression     Hypertension     Hypertensive heart disease with congestive heart failure ([facility] [address] [facility] ) 2020    Medial meniscus tear     left    Mitral valve prolapse     Spastic hemiplegia of right dominant side as late effect of cerebral infarction ([facility] [address] ) 2019    Stroke [facility])        Past Surgical History:   Procedure Laterality Date     SECTION      FRACTURE SURGERY      HYSTERECTOMY      [place] OPEN RX FEMUR FX+INTRAMED ALAYNA Left 2017    Procedure: INSERTION NAIL IM FEMUR ANTEGRADE (TROCHANTERIC); Surgeon: Ila Obregon MD;  [facility];  Service: Orthopedics    TONSILLECTOMY         Family History   Problem Relation Age of Onset    Arthritis Mother     Heart disease Father     Heart attack Father      I have reviewed and agree with the history as documented  E-Cigarette/Vaping    E-Cigarette Use Never User      E-Cigarette/Vaping Substances    Nicotine No     THC No     CBD No     Flavoring No     Other No     Unknown No      Social History     Tobacco Use    Smoking status: Never Smoker    Smokeless tobacco: Never Used   Vaping Use    Vaping Use: Never used   Substance Use Topics    Alcohol use: Not Currently    Drug use: No       Review of Systems   Constitutional: Positive for appetite change and diaphoresis  Negative for activity change, chills and fever  HENT: Negative for congestion, ear pain, rhinorrhea, sneezing and sore throat  Eyes: Negative for discharge  Respiratory: Negative for cough and shortness of breath  Cardiovascular: Negative for chest pain and leg swelling  Gastrointestinal: Positive for abdominal pain, nausea and vomiting  Negative for blood in stool and diarrhea  Endocrine: Negative for polyuria  Genitourinary: Negative for difficulty urinating, dysuria, frequency and urgency  Musculoskeletal: Negative for back pain and myalgias  Skin: Negative for rash  Neurological: Negative for dizziness, weakness, light-headedness, numbness and headaches  Hematological: Negative for adenopathy  Psychiatric/Behavioral: Negative for confusion  All other systems reviewed and are negative  Physical Exam  Physical Exam  Vitals and nursing note reviewed  Constitutional:       General: She is not in acute distress  Appearance: She is not ill-appearing, toxic-appearing or diaphoretic  HENT:      Head: Normocephalic  Right Ear: Tympanic membrane normal       Left Ear: Tympanic membrane normal       Nose: Nose normal  No congestion or rhinorrhea  Mouth/Throat:      Mouth: Mucous membranes are moist       Pharynx: No oropharyngeal exudate or posterior oropharyngeal erythema  Eyes:      General:         Right eye: No discharge  Left eye: No discharge  Extraocular Movements: Extraocular movements intact  Conjunctiva/sclera: Conjunctivae normal       Pupils: Pupils are equal, round, and reactive to light  Cardiovascular:      Rate and Rhythm: Normal rate  Pulses: Normal pulses  Pulmonary:      Effort: Pulmonary effort is normal  No respiratory distress  Breath sounds: Normal breath sounds  No wheezing or rales  Chest:      Chest wall: No tenderness  Abdominal:      General: Bowel sounds are normal  There is no distension  Tenderness: There is no abdominal tenderness  There is no right CVA tenderness, left CVA tenderness or guarding  Genitourinary:     Comments: Refused rectal exam  Musculoskeletal:         General: Normal range of motion  Cervical back: Normal range of motion and neck supple  Right lower leg: No edema  Left lower leg: No edema  Neurological:      Mental Status: She is alert  Comments: Expressive aphasia is at baseline  She is able to communicate with her family member    Alert mental status is at baseline she does have a dense right sylvester hemiplegia Psychiatric:         Mood and Affect: Mood normal          Vital Signs  ED Triage Vitals [04/29/22 2023]   Temperature Pulse Respirations Blood Pressure SpO2   97 8 °F (36 6 °C) 95 18 166/81 95 %      Temp Source Heart Rate Source Patient Position - Orthostatic VS BP Location FiO2 (%)   Tympanic Monitor Lying Left arm --      Pain Score       No Pain           Vitals:    04/29/22 2023 04/29/22 2145   BP: 166/81    Pulse: 95 90   Patient Position - Orthostatic VS: Lying          Visual Acuity      ED Medications  Medications   ondansetron (ZOFRAN) injection 4 mg (4 mg Intravenous Given 4/29/22 2054)   Famotidine (PF) (PEPCID) injection 20 mg (20 mg Intravenous Given 4/29/22 2054)   lactated ringers bolus 1,000 mL (0 mL Intravenous Stopped 4/29/22 2249)   iohexol (OMNIPAQUE) 350 MG/ML injection (SINGLE-DOSE) 100 mL (100 mL Intravenous Given 4/29/22 2214)       Diagnostic Studies  Results Reviewed     Procedure Component Value Units Date/Time    UA w Reflex to Microscopic w Reflex to Culture [[id number]] Collected: 04/29/22 2242    Lab Status: Final result Specimen: Urine, Clean Catch Updated: 04/29/22 2305     Color, UA Yellow     Clarity, UA Clear     Specific Gravity, UA 1 015     pH, UA 7 5     Leukocytes, UA Negative     Nitrite, UA Negative     Protein, UA Negative mg/dl      Glucose, UA Negative mg/dl      Ketones, UA Negative mg/dl      Urobilinogen, UA 0 2 E U /dl      Bilirubin, UA Negative     Blood, UA Negative    High Sensitivity Troponin I Random [[id number]]  (Abnormal) Collected: 04/29/22 2111    Lab Status: Final result Specimen: Blood from Arm, Left Updated: 04/29/22 2138     HS TnI random 2 ng/L     Basic metabolic panel [[id number]] Collected: 04/29/22 2111    Lab Status: Final result Specimen: Blood from Arm, Left Updated: 04/29/22 2130     Sodium 138 mmol/L      Potassium 3 9 mmol/L      Chloride 103 mmol/L      CO2 24 mmol/L      ANION GAP 11 mmol/L      BUN 21 mg/dL      Creatinine 0 91 mg/dL Glucose 116 mg/dL      Calcium 9 2 mg/dL      eGFR 65 ml/min/1 73sq m     Narrative:      [place] guidelines for Chronic Kidney Disease (CKD):     Stage 1 with normal or high GFR (GFR > 90 mL/min/1 73 square meters)    Stage 2 Mild CKD (GFR = 60-89 mL/min/1 73 square meters)    Stage 3A Moderate CKD (GFR = 45-59 mL/min/1 73 square meters)    Stage 3B Moderate CKD (GFR = 30-44 mL/min/1 73 square meters)    Stage 4 Severe CKD (GFR = 15-29 mL/min/1 73 square meters)    Stage 5 End Stage CKD (GFR <15 mL/min/1 73 square meters)  Note: GFR calculation is accurate only with a steady state creatinine    Hepatic function panel [[id number]]  (Abnormal) Collected: 04/29/22 2111    Lab Status: Final result Specimen: Blood from Arm, Left Updated: 04/29/22 2130     Total Bilirubin 0 30 mg/dL      Bilirubin, Direct 0 06 mg/dL      Alkaline Phosphatase 155 U/L      AST 20 U/L      ALT 20 U/L      Total Protein 7 6 g/dL      Albumin 3 6 g/dL     Lipase [[id number]]  (Normal) Collected: 04/29/22 2111    Lab Status: Final result Specimen: Blood from Arm, Left Updated: 04/29/22 2130     Lipase 123 u/L     Magnesium [[id number]]  (Normal) Collected: 04/29/22 2111    Lab Status: Final result Specimen: Blood from Arm, Left Updated: 04/29/22 2130     Magnesium 2 3 mg/dL     Lactic acid [[id number]]  (Normal) Collected: 04/29/22 2034    Lab Status: Final result Specimen: Blood from Arm, Left Updated: 04/29/22 2108     LACTIC ACID 1 7 mmol/L     Narrative:      Result may be elevated if tourniquet was used during collection      Protime-INR [[id number]]  (Normal) Collected: 04/29/22 2034    Lab Status: Final result Specimen: Blood from Arm, Left Updated: 04/29/22 2059     Protime 11 7 seconds      INR 0 90    APTT [[id number]]  (Normal) Collected: 04/29/22 2034    Lab Status: Final result Specimen: Blood from Arm, Left Updated: 04/29/22 2059     PTT 23 seconds     CBC and differential [[id number]]  (Abnormal) Collected: 04/29/22 2034    Lab Status: Final result Specimen: Blood from Arm, Left Updated: 04/29/22 2058     WBC 14 88 Thousand/uL      RBC 4 53 Million/uL      Hemoglobin 13 7 g/dL      Hematocrit 42 6 %      MCV 94 fL      MCH 30 2 pg      MCHC 32 2 g/dL      RDW 13 0 %      MPV 10 7 fL      Platelets 238 Thousands/uL      nRBC 0 /100 WBCs      Neutrophils Relative 76 %      Immat GRANS % 0 %      Lymphocytes Relative 20 %      Monocytes Relative 4 %      Eosinophils Relative 0 %      Basophils Relative 0 %      Neutrophils Absolute 11 18 Thousands/µL      Immature Grans Absolute 0 04 Thousand/uL      Lymphocytes Absolute 2 91 Thousands/µL      Monocytes Absolute 0 65 Thousand/µL      Eosinophils Absolute 0 04 Thousand/µL      Basophils Absolute 0 06 Thousands/µL                  CT abdomen pelvis with contrast   Final Result by Jersey Gould DO (04/29 2258)      Marked thickening of the rectal wall and sigmoid colon wall with significant surrounding inflammatory changes  Findings may represent colitis (infectious versus inflammatory cannot rule out ischemia)  Follow-up with gastroenterology is recommended  Recommend posttreatment colonoscopy to rule out underlying neoplasm  The study was marked in [facility] for immediate notification  Workstation performed: [id number]                    Procedures  Procedures         ED Course  ED Course as of 04/29/22 2351   Fri Apr 29, 2022   2336 Extensively reviewed CT findings and labs; recommended rectal exam and check of guiac patient refused  Feels markedly improved did recommend GI followup  Do not recommend antibiotics but recommend low residue diet   Return precautions where reviewed   [phone] Reviewed with patient and family member recommendations for GI followup to exclude possibility of cancer verbalized understanding                                             MDM  Number of Diagnoses or Management Options  Diagnosis management comments: Mdm:   Patient with acute onset of nausea and vomiting will evaluate for acute coronary syndrome intra-abdominal process initiate symptomatic management with antiemetics and IV fluids  Disposition  Final diagnoses:   Nausea and vomiting   Abdominal pain - resovled   Abnormal CT of the abdomen - Marked thickening of the rectal wall and sigmoid colon wall with significant surrounding inflammatory changes  Findings may represent colitis (infectious versus inflammatory cannot rule out ischemia)  Time reflects when diagnosis was documented in both MDM as applicable and the Disposition within this note     Time User Action Codes Description Comment    4/29/2022 11:38 PM Collene Hayde Add [R11 2] Nausea and vomiting     4/29/2022 11:39 PM Collene Hayde Add [R10 9] Abdominal pain     4/29/2022 11:39 PM Collene Hayde Modify [R10 9] Abdominal pain resovled    4/29/2022 11:39 PM Chastity Gomez Add [R93 5] Abnormal CT of the abdomen     4/29/2022 11:39 PM Collene Hayde Modify [R93 5] Abnormal CT of the abdomen Marked thickening of the rectal wall and sigmoid colon wall with significant surrounding inflammatory changes  Findings may represent colitis (infectious versus inflammatory cannot rule out ischemia)  ED Disposition     ED Disposition Condition Date/Time Comment    Discharge Stable Fri Apr 29, 2022 11:38 PM Cele Dee discharge to home/self care              Follow-up Information     Follow up With Specialties Details Why Contact Info Additional Information    Tenisha Zavala,  Family Medicine Go in 3 days if not improved [facility] [address]  [facility] Call in 3 days follow up of abnormal CT scan and consideration of colonoscopy to rule out cancer [address] [address]  Heather Mauro [address] [facility] [address] [facility], [place], [address]   [phone]          Patient's Medications   Discharge Prescriptions    ONDANSETRON (ZOFRAN-ODT) 4 MG DISINTEGRATING TABLET    Take 1 tablet (4 mg total) by mouth every 8 (eight) hours as needed for nausea or vomiting for up to 20 doses       Start Date: 4/29/2022 End Date: --       Order Dose: 4 mg       Quantity: 20 tablet    Refills: 0           PDMP Review     None          ED Provider  Electronically Signed by           Cris Aviles MD  04/29/22 1018       Cris Aviles MD  04/29/22 0503

## 2022-04-30 NOTE — DISCHARGE INSTRUCTIONS
Plenty of fluids  [employer] bannas rice applesauce toast rice  Avoid peas, corn, raw veggies and fiber for 1 week harder for colon to digest  Return with fever, inability to keep fluids down dark tarry stools red stools abdominal pain or any new or worsening symptoms

## 2022-05-01 LAB
ATRIAL RATE: 91 BPM
P AXIS: 30 DEGREES
PR INTERVAL: 158 MS
QRS AXIS: -1 DEGREES
QRSD INTERVAL: 88 MS
QT INTERVAL: 386 MS
QTC INTERVAL: 474 MS
T WAVE AXIS: 39 DEGREES
VENTRICULAR RATE: 91 BPM

## 2022-05-01 PROCEDURE — 93010 ELECTROCARDIOGRAM REPORT: CPT | Performed by: INTERNAL MEDICINE

## 2022-05-26 DIAGNOSIS — M19.90 ARTHRITIS: ICD-10-CM

## 2022-05-26 RX ORDER — IBUPROFEN 600 MG/1
TABLET ORAL
Qty: 270 TABLET | Refills: 3 | Status: SHIPPED | OUTPATIENT
Start: 2022-05-26

## 2022-07-20 ENCOUNTER — APPOINTMENT (OUTPATIENT)
Dept: RADIOLOGY | Facility: MEDICAL CENTER | Age: 67
End: 2022-07-20
Payer: COMMERCIAL

## 2022-07-20 ENCOUNTER — OFFICE VISIT (OUTPATIENT)
Dept: URGENT CARE | Facility: MEDICAL CENTER | Age: 67
End: 2022-07-20
Payer: COMMERCIAL

## 2022-07-20 VITALS
HEART RATE: 94 BPM | WEIGHT: 210 LBS | RESPIRATION RATE: 18 BRPM | OXYGEN SATURATION: 94 % | BODY MASS INDEX: 30.06 KG/M2 | TEMPERATURE: 98.5 F | HEIGHT: 70 IN

## 2022-07-20 DIAGNOSIS — S49.91XA INJURY OF RIGHT SHOULDER, INITIAL ENCOUNTER: ICD-10-CM

## 2022-07-20 DIAGNOSIS — S42.201A CLOSED FRACTURE OF PROXIMAL END OF RIGHT HUMERUS, UNSPECIFIED FRACTURE MORPHOLOGY, INITIAL ENCOUNTER: Primary | ICD-10-CM

## 2022-07-20 PROCEDURE — 99212 OFFICE O/P EST SF 10 MIN: CPT | Performed by: PHYSICIAN ASSISTANT

## 2022-07-20 PROCEDURE — 73030 X-RAY EXAM OF SHOULDER: CPT

## 2022-07-20 NOTE — PATIENT INSTRUCTIONS
Take Tylenol or Motrin as needed for pain  Keep splint in place until evaluated by Orthopedics  Apply ice to the affected area, for 15 minutes every 2 hours  Do not apply ice directly to bear skin  Follow-up with orthopedics as soon as possible       [place] [facility] 07/29/2022 at 1:30 PM

## 2022-07-20 NOTE — PROGRESS NOTES
[facility]        NAME: Sumit Nagy is a 77 y o  female  : 1955    MRN: [id number]  DATE: 2022  TIME: 4:10 PM    Assessment and Plan   Closed fracture of proximal end of right humerus, unspecified fracture morphology, initial encounter [S42 201A]  1  Closed fracture of proximal end of right humerus, unspecified fracture morphology, initial encounter  Ambulatory Referral to Orthopedic Surgery   2  Injury of right shoulder, initial encounter  XR shoulder 2+ vw right         Patient Instructions     [facility] 2022 at 1:30 PM     Take Tylenol or Motrin as needed for pain  Keep splint in place until evaluated by Orthopedics  Apply ice to the affected area, for 15 minutes every 2 hours  Do not apply ice directly to bear skin  Follow-up with orthopedics as soon as possible  Follow up with PCP in 3-5 days  Proceed to  ER if symptoms worsen  Chief Complaint     Chief Complaint   Patient presents with    Arm Injury     Pt  Has a hx of CVA and was being assisted from couch by aid, her aide fell backwards and pt  Joaquina Ramos with her, c/o pain to right shoulder down right arm, right arm is paralyzed but has some sensation, happened today at 1430, pt  Has expressive aphasia and daughter spoke for her during triage         History of Present Illness       Patient with a history of CVA associated with expressive aphasia was being transferred by her caretaker this afternoon  The aide fell backwards and the patient fell with her  Patient injured her right shoulder  Patient has paralysis of the right arm secondary to CVA  Review of Systems   Review of Systems   Constitutional: Negative for fever     Musculoskeletal:        Right shoulder pain         Current Medications       Current Outpatient Medications:     aspirin 325 mg tablet, Take 325 mg by mouth daily, Disp: , Rfl:     atorvastatin (LIPITOR) 80 mg tablet, Take 1 tablet (80 mg total) by mouth daily, Disp: 90 tablet, Rfl: 1    cholecalciferol (VITAMIN D3) 1,000 units tablet, Take 2,000 Units by mouth daily , Disp: , Rfl:     escitalopram (LEXAPRO) 10 mg tablet, Take 1 tablet (10 mg total) by mouth daily, Disp: 90 tablet, Rfl: 1    ibuprofen (MOTRIN) 600 mg tablet, TAKE 1 TABLET EVERY 8 HOURS AS NEEDED FOR MILD PAIN OR MODERATE PAIN FOR UP TO 5 DAYS, Disp: 270 tablet, Rfl: 3    Incontinence Supply Disposable (A + D Personal Care Wipes) MISC, Use daily, Disp: 2 each, Rfl: 11    Misc   Devices (Wheelchair Cushion) [address] [facility], by Does not apply route daily Combo cushion, Disp: 1 each, Rfl: 0    nitrofurantoin (MACRODANTIN) 100 mg capsule, Take 100 mg by mouth 4 (four) times a day, Disp: , Rfl:     ondansetron (ZOFRAN-ODT) 4 mg disintegrating tablet, Take 1 tablet (4 mg total) by mouth every 8 (eight) hours as needed for nausea or vomiting for up to 20 doses, Disp: 20 tablet, Rfl: 0    potassium chloride (Klor-Con M20) 20 mEq tablet, Take 1 tablet (20 mEq total) by mouth daily, Disp: 90 tablet, Rfl: 1    Current Allergies     Allergies as of 2022    (No Known Allergies)            The following portions of the patient's history were reviewed and updated as appropriate: allergies, current medications, past family history, past medical history, past social history, past surgical history and problem list      Past Medical History:   Diagnosis Date    CVA (cerebral vascular accident) ([address] )     CVA, old, aphasia     Depression     Hypertension     Hypertensive heart disease with congestive heart failure ([address] ) 2020    Medial meniscus tear     left    Mitral valve prolapse     Spastic hemiplegia of right dominant side as late effect of cerebral infarction ([address] ) 2019    Stroke [facility])        Past Surgical History:   Procedure Laterality Date     SECTION      FRACTURE SURGERY      HYSTERECTOMY      [place] OPEN RX FEMUR FX+INTRAMED ALAYNA Left 2017    Procedure: INSERTION NAIL IM FEMUR ANTEGRADE (TROCHANTERIC); Surgeon: Case Jose MD;  [facility];  Service: Orthopedics    TONSILLECTOMY         Family History   Problem Relation Age of Onset    Arthritis Mother     Heart disease Father     Heart attack Father          Medications have been verified  Objective   Pulse 94   Temp 98 5 °F (36 9 °C)   Resp 18   Ht 5' 10" (1 778 m)   Wt 95 3 kg (210 lb)   LMP  (LMP Unknown)   SpO2 94%   BMI 30 13 kg/m²   No LMP recorded (lmp unknown)  Patient has had a hysterectomy  Physical Exam     Physical Exam  Vitals and nursing note reviewed  Constitutional:       Appearance: Normal appearance  HENT:      Head: Normocephalic  Cardiovascular:      Rate and Rhythm: Normal rate and regular rhythm  Pulmonary:      Effort: Pulmonary effort is normal    Musculoskeletal:      Comments: Right shoulder with tenderness over the proximal humerus  Unable to assess range of motion secondary to paralysis  Skin:     General: Skin is warm  Neurological:      Mental Status: She is alert  Right shoulder x-ray - proximal humerus fracture  Orthopedic injury treatment    Date/Time: 7/20/2022 3:50 PM  Performed by: Kylah Ayala PA-C  Authorized by: Kylah Ayala PA-C   Universal Protocol:  Consent: Verbal consent obtained  Consent given by: patient  Patient understanding: patient states understanding of the procedure being performed  Patient identity confirmed: verbally with patient      Injury location:  Shoulder  Location details:  Right shoulder  Injury type:  Fracture  Fracture type: surgical neck    Neurovascular status: Neurovascularly intact    Immobilization:  Other (comment) (Shoulder mobilizer)  Supplies used: Shoulder mobilizer    Neurovascular status: Neurovascularly intact    Patient tolerance:  Patient tolerated the procedure well with no immediate complications

## 2022-07-22 ENCOUNTER — TELEPHONE (OUTPATIENT)
Dept: FAMILY MEDICINE CLINIC | Facility: CLINIC | Age: 67
End: 2022-07-22

## 2022-07-22 DIAGNOSIS — S42.296A OTHER CLOSED NONDISPLACED FRACTURE OF PROXIMAL END OF HUMERUS, UNSPECIFIED LATERALITY, INITIAL ENCOUNTER: Primary | ICD-10-CM

## 2022-07-22 RX ORDER — HYDROCODONE BITARTRATE AND ACETAMINOPHEN 5; 325 MG/1; MG/1
1 TABLET ORAL EVERY 6 HOURS PRN
Qty: 12 TABLET | Refills: 0 | Status: SHIPPED | OUTPATIENT
Start: 2022-07-22 | End: 2022-07-22

## 2022-07-22 RX ORDER — HYDROCODONE BITARTRATE AND ACETAMINOPHEN 5; 325 MG/1; MG/1
1 TABLET ORAL EVERY 6 HOURS PRN
Qty: 12 TABLET | Refills: 0 | Status: SHIPPED | OUTPATIENT
Start: 2022-07-22 | End: 2022-07-29

## 2022-07-22 NOTE — TELEPHONE ENCOUNTER
Was seen at [facility] on 07/20/2022 - was told to take tylenol or motrin for pain but it is not helping - is there something you can prescribe to help with the pain - has an ortho apt for 07/29/2022

## 2022-07-25 NOTE — PROGRESS NOTES
77 y o female presents for urgent care follow-up of right proximal humerus fracture comminuted minimally displaced surgical neck region  Patient fell at home 2022 when trying to get up with an aid  Patient now 9 days post injury  Patient had previous CVA with expressive aphasia and right arm paralysis  Notes expected she has no active use of the right arm including the shoulder  Review of Systems  Review of systems negative unless otherwise specified in HPI    Past Medical History  Past Medical History:   Diagnosis Date    [facility]     CVA, old, aphasia     Depression     Hypertension     Hypertensive heart disease with congestive heart failure ([facility] ) 2020    Medial meniscus tear     left    Mitral valve prolapse     Spastic hemiplegia of right dominant side as late effect of cerebral infarction ([facility] ) 2019    [facility]roke [facility])      Past Surgical History  Past Surgical History:   Procedure Laterality Date     SECTION      FRACTURE SURGERY      HYSTERECTOMY      [facility] OPEN RX FEMUR FX+INTRAMED ALAYNA Left 2017    Procedure: INSERTION NAIL IM FEMUR ANTEGRADE (TROCHANTERIC); Surgeon: Joseluis Donovan MD;  [facility];  Service: Orthopedics    TONSILLECTOMY       Current Medications  Current Outpatient Medications on File Prior to Visit   Medication Sig Dispense Refill    aspirin 325 mg tablet Take 325 mg by mouth daily      atorvastatin (LIPITOR) 80 mg tablet Take 1 tablet (80 mg total) by mouth daily 90 tablet 1    cholecalciferol (VITAMIN D3) 1,000 units tablet Take 2,000 Units by mouth daily       escitalopram (LEXAPRO) 10 mg tablet Take 1 tablet (10 mg total) by mouth daily 90 tablet 1    ibuprofen (MOTRIN) 600 mg tablet TAKE 1 TABLET EVERY 8 HOURS AS NEEDED FOR MILD PAIN OR MODERATE PAIN FOR UP TO 5 DAYS 270 tablet 3    Incontinence Supply Disposable (A + D Personal Care Wipes) MISC Use daily 2 each 11    Misc   Devices (Wheelchair Cushion) [address] [facility] by Does not apply route daily Combo cushion 1 each 0    nitrofurantoin (MACRODANTIN) 100 mg capsule Take 100 mg by mouth 4 (four) times a day      ondansetron (ZOFRAN-ODT) 4 mg disintegrating tablet Take 1 tablet (4 mg total) by mouth every 8 (eight) hours as needed for nausea or vomiting for up to 20 doses 20 tablet 0    potassium chloride (Klor-Con M20) 20 mEq tablet Take 1 tablet (20 mEq total) by mouth daily 90 tablet 1    [DISCONTINUED] HYDROcodone-acetaminophen (Norco) 5-325 mg per tablet Take 1 tablet by mouth every 6 (six) hours as needed for pain Max Daily Amount: 4 tablets 12 tablet 0     No current facility-administered medications on file prior to visit  Recent Labs [facility])  0   Lab Value Date/Time    HCT 42 6 04/29/2022 2034    HCT 44 0 05/11/2015 1725    HGB 13 7 04/29/2022 2034    HGB 14 6 05/11/2015 1725    WBC 14 88 (H) 04/29/2022 2034    WBC 11 97 (H) 05/11/2015 1725    INR 0 90 04/29/2022 2034    ESR 20 05/11/2015 1725    GLUCOSE 87 05/11/2015 1725    HGBA1C 5 7 05/23/2016 0439     Physical exam  Body mass index is 30 13 kg/m²  · General: Awake, Alert, Oriented  · Eyes: Pupils equal, round and reactive to light  · Heart: regular rate and rhythm  · Lungs: No audible wheezing  · Abdomen: soft  right Shoulder with tenderness with palpation  Slight sulcus sign, however humeral head appears be located  There is regressive ecchymosis traveling inferiorly secondary to gravity  She has clenched fist and flexed wrist and area to the CVA hemiplegia  No active wrist finger elbow motion  No gross outward long bone deformity  Fingers are warm  Procedure  None today    Imaging  I personally reviewed x-rays right shoulder taken in the office today which note slight increased inferior translation of the humeral head in the socket but on Y view appears that the head is maintained the joint    No significant change in the fracture position  1  Closed fracture of proximal end of right humerus, unspecified fracture morphology, initial encounter      Assessment:  right proximal humerus minimally displaced comminuted surgical neck fracture, closed  Fracture / Dislocation Treatment    Date/Time: 7/29/2022 2:06 PM  Performed by: Milton Samuel PA-C  Authorized by: Milton Samuel PA-C     Patient Location:  [facility] Protocol:  Consent given by: patient  Timeout called at: 7/29/2022 2:06 PM   Patient identity confirmed: verbally with patient      Injury location:  Shoulder  Location details:  Right shoulder  Distal perfusion: normal    Neurological function: absent    Neurological function comment:  Previous CVA with hemiplegia  Range of motion: reduced        Plan:  Continue with the sling  New prescription for 10 a coat on tablets after review of the PDMP to be use apparently on a p r n  basis with caution with possible constipation and or dizziness  Continue nonweightbearing on the right arm continue applying ice 20 minutes 3 to 4 times a day as needed for pain or swelling  Recheck in 3 weeks with repeat x-ray  Attempted to reach radiologist about confirmation of maintaining the glenohumeral joint however no one was available to answer the phone  Discussed this with the patient and caregiver and this has less consequence the fact that the patient has no function of the arm previously  This note was created with voice recognition software

## 2022-07-29 ENCOUNTER — APPOINTMENT (OUTPATIENT)
Dept: RADIOLOGY | Facility: MEDICAL CENTER | Age: 67
End: 2022-07-29
Payer: COMMERCIAL

## 2022-07-29 ENCOUNTER — OFFICE VISIT (OUTPATIENT)
Dept: OBGYN CLINIC | Facility: CLINIC | Age: 67
End: 2022-07-29
Payer: COMMERCIAL

## 2022-07-29 VITALS
HEART RATE: 84 BPM | HEIGHT: 70 IN | BODY MASS INDEX: 30.13 KG/M2 | SYSTOLIC BLOOD PRESSURE: 130 MMHG | DIASTOLIC BLOOD PRESSURE: 83 MMHG

## 2022-07-29 DIAGNOSIS — S42.201A CLOSED FRACTURE OF PROXIMAL END OF RIGHT HUMERUS, UNSPECIFIED FRACTURE MORPHOLOGY, INITIAL ENCOUNTER: Primary | ICD-10-CM

## 2022-07-29 DIAGNOSIS — S42.201A CLOSED FRACTURE OF PROXIMAL END OF RIGHT HUMERUS, UNSPECIFIED FRACTURE MORPHOLOGY, INITIAL ENCOUNTER: ICD-10-CM

## 2022-07-29 PROCEDURE — 73030 X-RAY EXAM OF SHOULDER: CPT

## 2022-07-29 PROCEDURE — 99203 OFFICE O/P NEW LOW 30 MIN: CPT | Performed by: PHYSICIAN ASSISTANT

## 2022-07-29 RX ORDER — HYDROCODONE BITARTRATE AND ACETAMINOPHEN 5; 325 MG/1; MG/1
1 TABLET ORAL 2 TIMES DAILY PRN
Qty: 10 TABLET | Refills: 0 | Status: SHIPPED | OUTPATIENT
Start: 2022-07-29

## 2022-07-29 NOTE — PATIENT INSTRUCTIONS
Continue with the sling  New prescription for 10 a coat on tablets after review of the PDMP to be use apparently on a p r n  basis with caution with possible constipation and or dizziness  Continue nonweightbearing on the right arm continue applying ice 20 minutes 3 to 4 times a day as needed for pain or swelling  Recheck in 3 weeks with repeat x-ray

## 2022-08-23 NOTE — PROGRESS NOTES
77 y o female presents for follow-up x-ray right proximal humerus fracture with injury date 2022  She is now 5 weeks post injury  She has expressive aphasia and right arm paralysis from previous CVA  Patient and caregiver is no that the pain is dramatically decreased  Review of Systems  Review of systems negative unless otherwise specified in HPI    Past Medical History  Past Medical History:   Diagnosis Date    [facility] )     CVA, old, aphasia     Depression     Hypertension     Hypertensive heart disease with congestive heart failure ([facility] ) 2020    Medial meniscus tear     left    Mitral valve prolapse     Spastic hemiplegia of right dominant side as late effect of cerebral infarction ([facility] ) 2019    Stroke [facility])      Past Surgical History  Past Surgical History:   Procedure Laterality Date     SECTION      FRACTURE SURGERY      HYSTERECTOMY      [place] OPEN RX FEMUR FX+INTRAMED ALAYNA Left 2017    Procedure: INSERTION NAIL IM FEMUR ANTEGRADE (TROCHANTERIC);   Surgeon: Álvaro Blair MD;  [facility];  Service: Orthopedics    TONSILLECTOMY       Current Medications  Current Outpatient Medications on File Prior to Visit   Medication Sig Dispense Refill    aspirin 325 mg tablet Take 325 mg by mouth daily      atorvastatin (LIPITOR) 80 mg tablet Take 1 tablet (80 mg total) by mouth daily 90 tablet 1    cholecalciferol (VITAMIN D3) 1,000 units tablet Take 2,000 Units by mouth daily       escitalopram (LEXAPRO) 10 mg tablet Take 1 tablet (10 mg total) by mouth daily 90 tablet 1    HYDROcodone-acetaminophen (Norco) 5-325 mg per tablet Take 1 tablet by mouth 2 (two) times a day as needed for pain Max Daily Amount: 2 tablets 10 tablet 0    ibuprofen (MOTRIN) 600 mg tablet TAKE 1 TABLET EVERY 8 HOURS AS NEEDED FOR MILD PAIN OR MODERATE PAIN FOR UP TO 5 DAYS 270 tablet 3    Incontinence Supply Disposable (A + D Personal Care Wipes) MISC Use daily 2 each 11    Misc  Devices (Wheelchair Cushion) [address] [facility] by Does not apply route daily Combo cushion 1 each 0    nitrofurantoin (MACRODANTIN) 100 mg capsule Take 100 mg by mouth 4 (four) times a day      ondansetron (ZOFRAN-ODT) 4 mg disintegrating tablet Take 1 tablet (4 mg total) by mouth every 8 (eight) hours as needed for nausea or vomiting for up to 20 doses 20 tablet 0    potassium chloride (Klor-Con M20) 20 mEq tablet Take 1 tablet (20 mEq total) by mouth daily 90 tablet 1     No current facility-administered medications on file prior to visit  Recent Labs [facility])  0   Lab Value Date/Time    HCT 42 6 04/29/2022 2034    HCT 44 0 05/11/2015 1725    HGB 13 7 04/29/2022 2034    HGB 14 6 05/11/2015 1725    WBC 14 88 (H) 04/29/2022 2034    WBC 11 97 (H) 05/11/2015 1725    INR 0 90 04/29/2022 2034    ESR 20 05/11/2015 1725    GLUCOSE 87 05/11/2015 1725    HGBA1C 5 7 05/23/2016 0439     Physical exam  Body mass index is 30 13 kg/m²  · General: Awake, Alert, Oriented  · Eyes: Pupils equal, round and reactive to light  · Heart: regular rate and rhythm  · Lungs: No audible wheezing  · Abdomen: soft  right Shoulder  · Ecchymosis is resolved  Patient continues with the wrist and finger contractures from previous CVA  Sling is in place  Gentle passive motion of the shoulder elicits no pain  Radial pulse intact  No gross long bone deformity  Procedure  None today    Imaging  I personally reviewed x-rays of the right shoulder taken the office today which note grossly maintained position/alignment of the mildly displaced proximal humerus fracture without any significant healing and no obvious dislocation      1  Closed fracture of proximal end of right humerus with routine healing, unspecified fracture morphology, subsequent encounter      Assessment:  right proximal humerus fracture, 5 weeks post injury without significant healing by x-ray but significantly decreased pain     Plan:  Continue with sling for comfort may transition out of sling  Progress motion as tolerated  Continue nonweightbearing on the right arm  Return to office in 6 weeks for repeat x-ray and follow-up  This note was created with voice recognition software

## 2022-08-24 ENCOUNTER — APPOINTMENT (OUTPATIENT)
Dept: RADIOLOGY | Facility: MEDICAL CENTER | Age: 67
End: 2022-08-24
Payer: COMMERCIAL

## 2022-08-24 ENCOUNTER — TELEPHONE (OUTPATIENT)
Dept: OBGYN CLINIC | Facility: HOSPITAL | Age: 67
End: 2022-08-24

## 2022-08-24 ENCOUNTER — OFFICE VISIT (OUTPATIENT)
Dept: OBGYN CLINIC | Facility: CLINIC | Age: 67
End: 2022-08-24
Payer: COMMERCIAL

## 2022-08-24 VITALS
DIASTOLIC BLOOD PRESSURE: 79 MMHG | BODY MASS INDEX: 30.13 KG/M2 | SYSTOLIC BLOOD PRESSURE: 118 MMHG | HEIGHT: 70 IN | HEART RATE: 76 BPM

## 2022-08-24 DIAGNOSIS — S42.201D CLOSED FRACTURE OF PROXIMAL END OF RIGHT HUMERUS WITH ROUTINE HEALING, UNSPECIFIED FRACTURE MORPHOLOGY, SUBSEQUENT ENCOUNTER: ICD-10-CM

## 2022-08-24 DIAGNOSIS — S42.201D CLOSED FRACTURE OF PROXIMAL END OF RIGHT HUMERUS WITH ROUTINE HEALING, UNSPECIFIED FRACTURE MORPHOLOGY, SUBSEQUENT ENCOUNTER: Primary | ICD-10-CM

## 2022-08-24 PROCEDURE — 99213 OFFICE O/P EST LOW 20 MIN: CPT | Performed by: PHYSICIAN ASSISTANT

## 2022-08-24 PROCEDURE — 73030 X-RAY EXAM OF SHOULDER: CPT

## 2022-08-24 NOTE — PATIENT INSTRUCTIONS
Continue with sling for comfort may transition out of sling  Progress motion as tolerated  Continue nonweightbearing on the right arm  Return to office in 6 weeks for repeat x-ray and follow-up

## 2022-09-19 ENCOUNTER — OFFICE VISIT (OUTPATIENT)
Dept: FAMILY MEDICINE CLINIC | Facility: CLINIC | Age: 67
End: 2022-09-19
Payer: COMMERCIAL

## 2022-09-19 VITALS
HEART RATE: 68 BPM | TEMPERATURE: 98.8 F | DIASTOLIC BLOOD PRESSURE: 82 MMHG | SYSTOLIC BLOOD PRESSURE: 130 MMHG | RESPIRATION RATE: 20 BRPM

## 2022-09-19 DIAGNOSIS — E78.2 MIXED HYPERLIPIDEMIA: ICD-10-CM

## 2022-09-19 DIAGNOSIS — E55.9 VITAMIN D DEFICIENCY: ICD-10-CM

## 2022-09-19 DIAGNOSIS — Z86.73 H/O ISCHEMIC LEFT MCA STROKE: Primary | ICD-10-CM

## 2022-09-19 DIAGNOSIS — I69.398 DEPRESSION AS LATE EFFECT OF CEREBROVASCULAR ACCIDENT (CVA): ICD-10-CM

## 2022-09-19 DIAGNOSIS — F06.31 DEPRESSION AS LATE EFFECT OF CEREBROVASCULAR ACCIDENT (CVA): ICD-10-CM

## 2022-09-19 DIAGNOSIS — I69.351 SPASTIC HEMIPLEGIA OF RIGHT DOMINANT SIDE AS LATE EFFECT OF CEREBRAL INFARCTION (HCC): ICD-10-CM

## 2022-09-19 DIAGNOSIS — R47.01 EXPRESSIVE APHASIA: ICD-10-CM

## 2022-09-19 DIAGNOSIS — S42.296A OTHER CLOSED NONDISPLACED FRACTURE OF PROXIMAL END OF HUMERUS, UNSPECIFIED LATERALITY, INITIAL ENCOUNTER: ICD-10-CM

## 2022-09-19 PROBLEM — F11.20 CONTINUOUS OPIOID DEPENDENCE (HCC): Status: ACTIVE | Noted: 2022-09-19

## 2022-09-19 PROCEDURE — 99214 OFFICE O/P EST MOD 30 MIN: CPT | Performed by: FAMILY MEDICINE

## 2022-09-19 NOTE — PROGRESS NOTES
Assessment/Plan:  Status post massive CVA with right-sided sylvester deficit and aphasia  The patient has normal cognition  Dyslipidemia on atorvastatin 80 mg laboratories pending    Depression improved Lexapro 10 mg    Impaired mobility in activities of daily living with spastic hemiplegia right dominant side as late affects of cerebral infarction the patient uses a wheelchair    Fracture of the right humerus currently under the care of orthopedics    Problem List Items Addressed This Visit        Cardiovascular and Mediastinum    H/O ischemic left MCA stroke - Primary       Nervous and Auditory    Spastic hemiplegia of right dominant side as late effect of cerebral infarction ([address] ) (Chronic)       Other    Expressive aphasia      Other Visit Diagnoses     Depression as late effect of cerebrovascular accident (CVA)        Other closed nondisplaced fracture of proximal end of humerus, unspecified laterality, initial encounter        Relevant Orders    DXA bone density spine hip and pelvis    Mixed hyperlipidemia        Vitamin D deficiency        Relevant Orders    Vitamin D 25 hydroxy           Diagnoses and all orders for this visit:    H/O ischemic left MCA stroke    Expressive aphasia    Depression as late effect of cerebrovascular accident (CVA)    Other closed nondisplaced fracture of proximal end of humerus, unspecified laterality, initial encounter  -     DXA bone density spine hip and pelvis; Future    Mixed hyperlipidemia    Spastic hemiplegia of right dominant side as late effect of cerebral infarction (HCC)    Vitamin D deficiency  -     Vitamin D 25 hydroxy; Future    Other orders  -     CALCIUM PO; Take by mouth  -     Ascorbic Acid (VITAMIN C PO); Take by mouth      No problem-specific Assessment & Plan notes found for this encounter        PHQ-2/9 Depression Screening    Little interest or pleasure in doing things: 0 - not at all  Feeling down, depressed, or hopeless: 0 - not at all  PHQ-2 Score: 0  PHQ-2 Interpretation: Negative depression screen          There is no height or weight on file to calculate BMI  BMI Counseling: There is no height or weight on file to calculate BMI  The BMI     Subjective:      Patient ID: Sundeep Booker is a 77 y o  female  Patient presents accompanied by her healthcare worker      The following portions of the patient's history were reviewed and updated as appropriate:   She has a past medical history of [address] (cerebral vascular [address]) ([facility] ), CVA, old, aphasia, Depression, Hypertension, Hypertensive heart disease with congestive heart failure ([facility] [facility] ) (2020), Medial meniscus tear, Mitral valve prolapse, Spastic hemiplegia of right dominant side as late effect of cerebral infarction ([facility] ) (2019), and Stroke ([facility] )  ,  does not have any pertinent problems on file  ,   has a past surgical history that includes Hysterectomy;  section; Tonsillectomy; Fracture surgery; and pr open rx femur fx+intramed rodriguez (Left, 2017)  ,  family history includes Arthritis in her mother; Heart attack in her father; Heart disease in her father  ,   reports that she has never smoked  She has never used smokeless tobacco  She reports previous alcohol use  She reports that she does not use drugs  ,  has No Known Allergies     Current Outpatient Medications   Medication Sig Dispense Refill    Ascorbic Acid (VITAMIN C PO) Take by mouth      CALCIUM PO Take by mouth      aspirin 325 mg tablet Take 325 mg by mouth daily      atorvastatin (LIPITOR) 80 mg tablet TAKE 1 TABLET DAILY 90 tablet 3    cholecalciferol (VITAMIN D3) 1,000 units tablet Take 2,000 Units by mouth daily       escitalopram (LEXAPRO) 10 mg tablet TAKE 1 TABLET DAILY 90 tablet 1    HYDROcodone-acetaminophen (Norco) 5-325 mg per tablet Take 1 tablet by mouth 2 (two) times a day as needed for pain Max Daily Amount: 2 tablets 10 tablet 0    ibuprofen (MOTRIN) 600 mg tablet TAKE 1 TABLET EVERY 8 HOURS AS NEEDED FOR MILD PAIN OR MODERATE PAIN FOR UP TO 5 DAYS 270 tablet 3    Incontinence Supply Disposable (A + D Personal Care Wipes) MISC Use daily 2 each 11    Misc  Devices (Wheelchair Cushion) [address] [facility] by Does not apply route daily Combo cushion 1 each 0    nitrofurantoin (MACRODANTIN) 100 mg capsule Take 100 mg by mouth 4 (four) times a day      ondansetron (ZOFRAN-ODT) 4 mg disintegrating tablet Take 1 tablet (4 mg total) by mouth every 8 (eight) hours as needed for nausea or vomiting for up to 20 doses 20 tablet 0    potassium chloride (Klor-Con M20) 20 mEq tablet Take 1 tablet (20 mEq total) by mouth daily 90 tablet 1     No current facility-administered medications for this visit  Review of Systems   Constitutional: Negative for chills and fever  HENT: Negative for ear pain and sore throat  Eyes: Negative for pain and visual disturbance  Respiratory: Negative for cough and shortness of breath  Cardiovascular: Negative for chest pain and palpitations  Gastrointestinal: Negative for abdominal pain and vomiting  Genitourinary: Negative for dysuria and hematuria  Musculoskeletal: Negative for arthralgias and back pain  Right arm pain from fracture   Skin: Negative for color change and rash  Neurological: Positive for weakness  Negative for seizures and syncope  Right-sided weakness post CVA   All other systems reviewed and are negative  Objective:    /82   Pulse 68   Temp 98 8 °F (37 1 °C)   Resp 20   LMP  (LMP Unknown)   There is no height or weight on file to calculate BMI  Physical Exam  Constitutional:       Appearance: She is well-developed  She is obese  HENT:      Head: Normocephalic  Eyes:      Pupils: Pupils are equal, round, and reactive to light  Cardiovascular:      Rate and Rhythm: Normal rate and regular rhythm  Heart sounds: Normal heart sounds  Pulmonary:      Effort: Pulmonary effort is normal       Breath sounds: Normal breath sounds  Abdominal:      General: Bowel sounds are normal       Palpations: Abdomen is soft  Tenderness: There is no abdominal tenderness  Musculoskeletal:      Cervical back: Normal range of motion  Skin:     General: Skin is warm  Neurological:      Mental Status: She is alert and oriented to person, place, and time        Comments: Right-sided sylvester deficit

## 2022-09-28 DIAGNOSIS — I11.0 HYPERTENSIVE HEART DISEASE WITH CONGESTIVE HEART FAILURE, UNSPECIFIED HEART FAILURE TYPE (HCC): ICD-10-CM

## 2022-09-28 RX ORDER — POTASSIUM CHLORIDE 1500 MG/1
TABLET, EXTENDED RELEASE ORAL
Qty: 90 TABLET | Refills: 3 | Status: SHIPPED | OUTPATIENT
Start: 2022-09-28

## 2022-10-04 ENCOUNTER — APPOINTMENT (OUTPATIENT)
Dept: RADIOLOGY | Facility: MEDICAL CENTER | Age: 67
End: 2022-10-04
Payer: COMMERCIAL

## 2022-10-04 ENCOUNTER — OFFICE VISIT (OUTPATIENT)
Dept: OBGYN CLINIC | Facility: CLINIC | Age: 67
End: 2022-10-04
Payer: COMMERCIAL

## 2022-10-04 VITALS
SYSTOLIC BLOOD PRESSURE: 139 MMHG | BODY MASS INDEX: 30.13 KG/M2 | HEIGHT: 70 IN | DIASTOLIC BLOOD PRESSURE: 83 MMHG | HEART RATE: 91 BPM

## 2022-10-04 DIAGNOSIS — S42.201D CLOSED FRACTURE OF PROXIMAL END OF RIGHT HUMERUS WITH ROUTINE HEALING, UNSPECIFIED FRACTURE MORPHOLOGY, SUBSEQUENT ENCOUNTER: ICD-10-CM

## 2022-10-04 DIAGNOSIS — S42.201D CLOSED FRACTURE OF PROXIMAL END OF RIGHT HUMERUS WITH ROUTINE HEALING, UNSPECIFIED FRACTURE MORPHOLOGY, SUBSEQUENT ENCOUNTER: Primary | ICD-10-CM

## 2022-10-04 PROCEDURE — 99212 OFFICE O/P EST SF 10 MIN: CPT | Performed by: STUDENT IN AN ORGANIZED HEALTH CARE EDUCATION/TRAINING PROGRAM

## 2022-10-04 PROCEDURE — 73030 X-RAY EXAM OF SHOULDER: CPT

## 2022-10-04 NOTE — PROGRESS NOTES
77 y o female presents for follow-up x-ray of right proximal humerus fracture in a patient with previous hemiparesis secondary to previous CVA  Injury was 2022, 2 1/2 months ago  She presents for presumed final x-ray and follow-up  She notes significantly less pain at the fracture site and actually no pain at all  Review of Systems  Review of systems negative unless otherwise specified in HPI    Past Medical History  Past Medical History:   Diagnosis Date    [facility]A [facility]cere[facility]) [facility] [address] )     CVA, old, aphasia     Depression     Hypertension     Hypertensive heart disease with congestive heart failure ([facility] [address] ) 2020    Medial meniscus tear     left    Mitral valve prolapse     Spastic hemiplegia of right dominant side as late effect of cerebral infarction ([facility] [address] ) 2019    Stroke [facility])      Past Surgical History  Past Surgical History:   Procedure Laterality Date     SECTION      FRACTURE SURGERY      HYSTERECTOMY      [place] OPEN RX FEMUR FX+INTRAMED ALAYNA Left 2017    Procedure: INSERTION NAIL IM FEMUR ANTEGRADE (TROCHANTERIC);   Surgeon: Doris Lakhani MD;  [facility];  Service: Orthopedics    TONSILLECTOMY       Current Medications  Current Outpatient Medications on File Prior to Visit   Medication Sig Dispense Refill    Ascorbic Acid (VITAMIN C PO) Take by mouth      aspirin 325 mg tablet Take 325 mg by mouth daily      atorvastatin (LIPITOR) 80 mg tablet TAKE 1 TABLET DAILY 90 tablet 3    CALCIUM PO Take by mouth      cholecalciferol (VITAMIN D3) 1,000 units tablet Take 2,000 Units by mouth daily       escitalopram (LEXAPRO) 10 mg tablet TAKE 1 TABLET DAILY 90 tablet 1    HYDROcodone-acetaminophen (Norco) 5-325 mg per tablet Take 1 tablet by mouth 2 (two) times a day as needed for pain Max Daily Amount: 2 tablets 10 tablet 0    ibuprofen (MOTRIN) 600 mg tablet TAKE 1 TABLET EVERY 8 HOURS AS NEEDED FOR MILD PAIN OR MODERATE PAIN FOR UP TO 5 DAYS 270 tablet 3    Incontinence Supply Disposable (A + D Personal Care Wipes) MISC Use daily 2 each 11    Klor-Con M20 20 MEQ tablet TAKE 1 TABLET DAILY 90 tablet 3    Misc  Devices (Wheelchair Cushion) [address] [facility] by Does not apply route daily Combo cushion 1 each 0    nitrofurantoin (MACRODANTIN) 100 mg capsule Take 100 mg by mouth 4 (four) times a day      ondansetron (ZOFRAN-ODT) 4 mg disintegrating tablet Take 1 tablet (4 mg total) by mouth every 8 (eight) hours as needed for nausea or vomiting for up to 20 doses 20 tablet 0     No current facility-administered medications on file prior to visit  Recent Labs [facility])  0   Lab Value Date/Time    HCT 42 6 04/29/2022 2034    HCT 44 0 05/11/2015 1725    HGB 13 7 04/29/2022 2034    HGB 14 6 05/11/2015 1725    WBC 14 88 (H) 04/29/2022 2034    WBC 11 97 (H) 05/11/2015 1725    INR 0 90 04/29/2022 2034    ESR 20 05/11/2015 1725    GLUCOSE 87 05/11/2015 1725    HGBA1C 5 7 05/23/2016 0439     Physical exam  Body mass index is 30 13 kg/m²  · General: Awake, Alert, Oriented  · Eyes: Pupils equal, round and reactive to light  · Heart: regular rate and rhythm  · Lungs: No audible wheezing  · Abdomen: soft  right Shoulder  · No gross outward long bone deformity  Resolved ecchymosis  No tenderness with palpation at the fracture site  No tenderness at the fracture site with a attempted internal external rotation of the arm she is limited due to the previous CVA/hemiparesis  Procedure  None today    Imaging  I personally reviewed x-rays right shoulder taken in the office today which note significant callus formation at the fracture site  Maintained alignment, no dislocation, acceptable alignment parameters    1   Closed fracture of proximal end of right humerus with routine healing, unspecified fracture morphology, subsequent encounter      Assessment:  right shoulder proximal humerus fracture minimally displaced surgical neck with injury date 07/20/2022, now clinically healed  Plan:  Patient may be discharged from care  Patient may weightbear on the arm  Return only if needed  This note was created with voice recognition software

## 2022-10-25 ENCOUNTER — TELEPHONE (OUTPATIENT)
Dept: FAMILY MEDICINE CLINIC | Facility: CLINIC | Age: 67
End: 2022-10-25

## 2023-01-16 ENCOUNTER — TELEPHONE (OUTPATIENT)
Dept: FAMILY MEDICINE CLINIC | Facility: CLINIC | Age: 68
End: 2023-01-16

## 2023-01-16 NOTE — TELEPHONE ENCOUNTER
Spoke to aid regarding missed apt today - she will have the aide that drives call us back to schedule

## 2023-01-18 ENCOUNTER — TELEPHONE (OUTPATIENT)
Dept: OTHER | Facility: OTHER | Age: 68
End: 2023-01-18

## 2023-01-18 NOTE — TELEPHONE ENCOUNTER
Patient's transportation caregiver called to reschedule patient's missed appointment  She would like a call back

## 2023-02-27 DIAGNOSIS — F06.31 DEPRESSION AS LATE EFFECT OF CEREBROVASCULAR ACCIDENT (CVA): ICD-10-CM

## 2023-02-27 DIAGNOSIS — I69.398 DEPRESSION AS LATE EFFECT OF CEREBROVASCULAR ACCIDENT (CVA): ICD-10-CM

## 2023-02-27 RX ORDER — ESCITALOPRAM OXALATE 10 MG/1
TABLET ORAL
Qty: 90 TABLET | Refills: 3 | Status: SHIPPED | OUTPATIENT
Start: 2023-02-27 | End: 2023-02-28 | Stop reason: SDUPTHER

## 2023-02-28 DIAGNOSIS — I11.0 HYPERTENSIVE HEART DISEASE WITH CONGESTIVE HEART FAILURE, UNSPECIFIED HEART FAILURE TYPE (HCC): ICD-10-CM

## 2023-02-28 DIAGNOSIS — E78.2 MIXED HYPERLIPIDEMIA: ICD-10-CM

## 2023-02-28 DIAGNOSIS — F06.31 DEPRESSION AS LATE EFFECT OF CEREBROVASCULAR ACCIDENT (CVA): ICD-10-CM

## 2023-02-28 DIAGNOSIS — I69.398 DEPRESSION AS LATE EFFECT OF CEREBROVASCULAR ACCIDENT (CVA): ICD-10-CM

## 2023-02-28 RX ORDER — ESCITALOPRAM OXALATE 10 MG/1
10 TABLET ORAL DAILY
Qty: 90 TABLET | Refills: 1 | Status: SHIPPED | OUTPATIENT
Start: 2023-02-28

## 2023-02-28 RX ORDER — ATORVASTATIN CALCIUM 80 MG/1
80 TABLET, FILM COATED ORAL DAILY
Qty: 90 TABLET | Refills: 1 | Status: SHIPPED | OUTPATIENT
Start: 2023-02-28

## 2023-02-28 RX ORDER — POTASSIUM CHLORIDE 20 MEQ/1
20 TABLET, EXTENDED RELEASE ORAL DAILY
Qty: 90 TABLET | Refills: 1 | Status: SHIPPED | OUTPATIENT
Start: 2023-02-28

## 2023-03-21 ENCOUNTER — OFFICE VISIT (OUTPATIENT)
Dept: FAMILY MEDICINE CLINIC | Facility: CLINIC | Age: 68
End: 2023-03-21

## 2023-03-21 VITALS
RESPIRATION RATE: 20 BRPM | SYSTOLIC BLOOD PRESSURE: 134 MMHG | DIASTOLIC BLOOD PRESSURE: 84 MMHG | HEART RATE: 84 BPM | TEMPERATURE: 98.4 F

## 2023-03-21 DIAGNOSIS — Z86.73 H/O ISCHEMIC LEFT MCA STROKE: ICD-10-CM

## 2023-03-21 DIAGNOSIS — E66.9 CLASS 1 OBESITY WITHOUT SERIOUS COMORBIDITY WITH BODY MASS INDEX (BMI) OF 30.0 TO 30.9 IN ADULT, UNSPECIFIED OBESITY TYPE: ICD-10-CM

## 2023-03-21 DIAGNOSIS — F06.31 DEPRESSION AS LATE EFFECT OF CEREBROVASCULAR ACCIDENT (CVA): ICD-10-CM

## 2023-03-21 DIAGNOSIS — I69.398 DEPRESSION AS LATE EFFECT OF CEREBROVASCULAR ACCIDENT (CVA): ICD-10-CM

## 2023-03-21 DIAGNOSIS — Z00.00 ANNUAL PHYSICAL EXAM: Primary | ICD-10-CM

## 2023-03-21 DIAGNOSIS — R47.01 EXPRESSIVE APHASIA: ICD-10-CM

## 2023-03-21 DIAGNOSIS — N39.41 URGENCY INCONTINENCE: ICD-10-CM

## 2023-03-21 DIAGNOSIS — I69.351 SPASTIC HEMIPLEGIA OF RIGHT DOMINANT SIDE AS LATE EFFECT OF CEREBRAL INFARCTION (HCC): ICD-10-CM

## 2023-03-21 RX ORDER — OXYBUTYNIN CHLORIDE 5 MG/1
5 TABLET ORAL 3 TIMES DAILY
Qty: 90 TABLET | Refills: 3 | Status: SHIPPED | OUTPATIENT
Start: 2023-03-21

## 2023-03-21 NOTE — PROGRESS NOTES
ADULT ANNUAL [id number] [address] PRIMARY CARE    NAME: Sundeep Booker  AGE: 79 y o  SEX: female  : 1955     DATE: 3/21/2023     Assessment and Plan: History of massive CVA with right sided hemideficit and aphasia    Dyslipidemia on atorvastatin 80 mg laboratories pending    Depression improved with Lexapro 10 mg daily    Urge incontinence we will add the triptan 5 mg 3 times daily     The patient defers mammography for breast cancer screening and defers colon cancer screening         Problem List Items Addressed This Visit    None      Immunizations and preventive care screenings were discussed with patient today  Appropriate education was printed on patient's after visit summary  Counseling:  Alcohol/drug use: discussed moderation in alcohol intake, the recommendations for healthy alcohol use, and avoidance of illicit drug use  Dental Health: discussed importance of regular tooth brushing, flossing, and dental visits  Injury prevention: discussed safety/seat belts, safety helmets, smoke detectors, carbon dioxide detectors, and smoking near bedding or upholstery  · Sexual health: discussed sexually transmitted diseases, partner selection, use of condoms, avoidance of unintended pregnancy, and contraceptive alternatives  BMI Counseling: There is no height or weight on file to calculate BMI  The BMI is above normal  Nutrition recommendations include decreasing portion sizes, encouraging healthy choices of fruits and vegetables, decreasing fast food intake, consuming healthier snacks, limiting drinks that contain sugar, moderation in carbohydrate intake, increasing intake of lean protein, reducing intake of saturated and trans fat and reducing intake of cholesterol  Exercise recommendations include moderate physical activity 150 minutes/week  No pharmacotherapy was ordered   Rationale for BMI follow-up plan is due to patient being overweight or obese  Depression Screening and Follow-up Plan: Patient was screened for depression during today's encounter  They screened negative with a PHQ-2 score of 1  No follow-ups on file  Chief Complaint:     Chief Complaint   Patient presents with   • Annual Exam   • Follow-up      History of Present Illness:     Adult Annual Physical   Patient here for a comprehensive physical exam  The patient reports no problems  Diet and Physical Activity  · Diet/Nutrition: well balanced diet  · Exercise: no formal exercise  Depression Screening  PHQ-2/9 Depression Screening    Little interest or pleasure in doing things: 0 - not at all  Feeling down, depressed, or hopeless: 1 - several days  PHQ-2 Score: 1  PHQ-2 Interpretation: Negative depression screen       General Health  · Sleep: sleeps well, gets 4-6 hours of sleep on average and gets 7-8 hours of sleep on average  · Hearing: normal - bilateral   · Vision: no vision problems  · Dental: no dental visits for >1 year  /GYN Health  · Patient is: postmenopausal  · Last menstrual period: yrs  · Contraceptive method: menapausal      Review of Systems:     Review of Systems   Constitutional: Negative for chills and fever  HENT: Negative for ear pain and sore throat  Eyes: Negative for pain and visual disturbance  Respiratory: Negative for cough and shortness of breath  Cardiovascular: Negative for chest pain and palpitations  Gastrointestinal: Negative for abdominal pain and vomiting  Genitourinary: Positive for urgency  Negative for dysuria and hematuria  Musculoskeletal: Positive for gait problem  Negative for arthralgias and back pain  Wheelchair-bound with right hemideficit   Skin: Negative for color change and rash  Neurological: Negative for seizures and syncope  All other systems reviewed and are negative       Past Medical History:     Past Medical History:   Diagnosis Date   • CVA (cerebral vascular accident) [facility])    • CVA, old, aphasia    • Depression    • Hypertension    • Hypertensive heart disease with congestive heart failure ([facility] [address] ) 2020   • Medial meniscus tear     left   • Mitral valve prolapse    • Spastic hemiplegia of right dominant side as late effect of [facility] 2019   • Stroke [facility])       Past Surgical History:     Past Surgical History:   Procedure Laterality Date   •  SECTION     • FRACTURE SURGERY     • HYSTERECTOMY     • [place] OPTX FEM SHFT FX W/INSJ IMED IMPLT W/WO SCREW Left 2017    Procedure: INSERTION NAIL IM FEMUR ANTEGRADE (TROCHANTERIC);   Surgeon: Jen Tai MD;  [facility];  Service: Orthopedics   • TONSILLECTOMY        Social History:     Social History     Socioeconomic History   • Marital status:      Spouse name: None   • Number of children: None   • Years of education: None   • Highest education level: None   Occupational History   • None   Tobacco Use   • Smoking status: Never   • Smokeless tobacco: Never   Vaping Use   • Vaping Use: Never used   Substance and Sexual Activity   • Alcohol use: Not Currently   • Drug use: No   • Sexual activity: Not Currently   Other Topics Concern   • None   Social History Narrative   • None     Social Determinants of Health     Financial Resource Strain: Not on file   Food Insecurity: Not on file   Transportation Needs: Not on file   Physical Activity: Not on file   Stress: Not on file   Social Connections: Not on file   Intimate Partner Violence: Not on file   Housing Stability: Not on file      Family History:     Family History   Problem Relation Age of Onset   • Arthritis Mother    • Heart disease Father    • Heart attack Father       Current Medications:     Current Outpatient Medications   Medication Sig Dispense Refill   • Ascorbic Acid (VITAMIN C PO) Take by mouth     • aspirin 325 mg tablet Take 325 mg by mouth daily     • atorvastatin (LIPITOR) 80 mg tablet Take 1 tablet (80 mg total) by mouth daily 90 tablet 1   • CALCIUM PO Take by mouth     • cholecalciferol (VITAMIN D3) 1,000 units tablet Take 2,000 Units by mouth daily      • escitalopram (LEXAPRO) 10 mg tablet Take 1 tablet (10 mg total) by mouth daily 90 tablet 1   • HYDROcodone-acetaminophen (Norco) 5-325 mg per tablet Take 1 tablet by mouth 2 (two) times a day as needed for pain Max Daily Amount: 2 tablets 10 tablet 0   • ibuprofen (MOTRIN) 600 mg tablet TAKE 1 TABLET EVERY 8 HOURS AS NEEDED FOR MILD PAIN OR MODERATE PAIN FOR UP TO 5 DAYS 270 tablet 3   • Incontinence Supply Disposable (A + D Personal Care Wipes) MISC Use daily 2 each 11   • Misc  Devices (Wheelchair Cushion) [address] [facility] by Does not apply route daily Combo cushion 1 each 0   • nitrofurantoin (MACRODANTIN) 100 mg capsule Take 100 mg by mouth 4 (four) times a day     • ondansetron (ZOFRAN-ODT) 4 mg disintegrating tablet Take 1 tablet (4 mg total) by mouth every 8 (eight) hours as needed for nausea or vomiting for up to 20 doses 20 tablet 0   • potassium chloride (Klor-Con M20) 20 mEq tablet Take 1 tablet (20 mEq total) by mouth daily 90 tablet 1     No current facility-administered medications for this visit  Allergies:     No Known Allergies   Physical Exam:     /84   Pulse 84   Temp 98 4 °F (36 9 °C)   Resp 20   LMP  (LMP Unknown)     Physical Exam  Vitals and nursing note reviewed  Exam conducted with a chaperone present  Constitutional:       General: She is not in acute distress  Appearance: She is well-developed  She is obese  HENT:      Head: Normocephalic and atraumatic  Right Ear: Tympanic membrane, ear canal and external ear normal       Left Ear: Tympanic membrane, ear canal and external ear normal       Nose: Nose normal       Mouth/Throat:      Mouth: Mucous membranes are moist       Pharynx: Oropharynx is clear  Eyes:      Extraocular Movements: Extraocular movements intact        Conjunctiva/sclera: Conjunctivae normal       Pupils: Pupils are equal, round, and reactive to light  Cardiovascular:      Rate and Rhythm: Normal rate and regular rhythm  Heart sounds: No murmur heard  Pulmonary:      Effort: Pulmonary effort is normal  No respiratory distress  Breath sounds: Normal breath sounds  Abdominal:      General: Abdomen is flat  Bowel sounds are normal       Palpations: Abdomen is soft  Tenderness: There is no abdominal tenderness  Musculoskeletal:         General: No swelling  Normal range of motion  Cervical back: Normal range of motion and neck supple  Comments: Wheelchair-bound   Skin:     General: Skin is warm and dry  Capillary Refill: Capillary refill takes less than 2 seconds  Neurological:      Mental Status: She is alert and oriented to person, place, and time  Mental status is at baseline        Comments: Right-sided hemideficit with aphasia   Psychiatric:         Mood and Affect: Mood normal           Edilson Adame DO  [facility] PRIMARY CARE

## 2023-05-22 DIAGNOSIS — M19.90 ARTHRITIS: ICD-10-CM

## 2023-05-22 RX ORDER — IBUPROFEN 600 MG/1
TABLET ORAL
Qty: 270 TABLET | Refills: 3 | OUTPATIENT
Start: 2023-05-22

## 2023-07-21 ENCOUNTER — OFFICE VISIT (OUTPATIENT)
Dept: FAMILY MEDICINE CLINIC | Facility: CLINIC | Age: 68
End: 2023-07-21
Payer: COMMERCIAL

## 2023-07-21 VITALS
HEART RATE: 76 BPM | DIASTOLIC BLOOD PRESSURE: 98 MMHG | OXYGEN SATURATION: 96 % | SYSTOLIC BLOOD PRESSURE: 134 MMHG | RESPIRATION RATE: 16 BRPM

## 2023-07-21 DIAGNOSIS — N39.41 URGE INCONTINENCE: ICD-10-CM

## 2023-07-21 DIAGNOSIS — F06.31 DEPRESSION AS LATE EFFECT OF CEREBROVASCULAR ACCIDENT (CVA): ICD-10-CM

## 2023-07-21 DIAGNOSIS — R47.01 EXPRESSIVE APHASIA: ICD-10-CM

## 2023-07-21 DIAGNOSIS — Z86.73 H/O ISCHEMIC LEFT MCA STROKE: ICD-10-CM

## 2023-07-21 DIAGNOSIS — I10 HYPERTENSION, UNSPECIFIED TYPE: Primary | ICD-10-CM

## 2023-07-21 DIAGNOSIS — E78.2 MIXED HYPERLIPIDEMIA: ICD-10-CM

## 2023-07-21 DIAGNOSIS — I69.351 SPASTIC HEMIPLEGIA OF RIGHT DOMINANT SIDE AS LATE EFFECT OF CEREBRAL INFARCTION (HCC): ICD-10-CM

## 2023-07-21 DIAGNOSIS — I69.398 DEPRESSION AS LATE EFFECT OF CEREBROVASCULAR ACCIDENT (CVA): ICD-10-CM

## 2023-07-21 PROCEDURE — 99214 OFFICE O/P EST MOD 30 MIN: CPT | Performed by: FAMILY MEDICINE

## 2023-07-21 RX ORDER — LOSARTAN POTASSIUM 50 MG/1
50 TABLET ORAL DAILY
Qty: 30 TABLET | Refills: 0 | Status: SHIPPED | OUTPATIENT
Start: 2023-07-21

## 2023-07-21 NOTE — PROGRESS NOTES
Assessment/Plan: Blood pressure elevated at 134/98 we will begin Cozaar 50 mg daily. Dyslipidemia on atorvastatin 80 mg laboratories are pending    Depression has improved with Lexapro 10 mg daily    Urge incontinence we will add Ditropan 5 mg 3 times daily    Hemiplegia and aphasia patient is wheelchair-bound neurologically she is at baseline. The patient has a history of left MCA stroke    Problem List Items Addressed This Visit        Cardiovascular and Mediastinum    Hypertension - Primary    Relevant Medications    losartan (Cozaar) 50 mg tablet       Nervous and Auditory    Spastic hemiplegia of right dominant side as late effect of cerebral infarction (HCC) (Chronic)       Other    Expressive aphasia    H/O ischemic left MCA stroke    Relevant Medications    losartan (Cozaar) 50 mg tablet    Other Relevant Orders    CBC and differential   Other Visit Diagnoses     Mixed hyperlipidemia        Depression as late effect of cerebrovascular accident (CVA)        Urge incontinence               Diagnoses and all orders for this visit:    Hypertension, unspecified type    Mixed hyperlipidemia    Depression as late effect of cerebrovascular accident (CVA)    Urge incontinence    H/O ischemic left MCA stroke  -     losartan (Cozaar) 50 mg tablet; Take 1 tablet (50 mg total) by mouth daily  -     CBC and differential; Future    Expressive aphasia    Spastic hemiplegia of right dominant side as late effect of cerebral infarction [facility])        No problem-specific Assessment & Plan notes found for this encounter. PHQ-2/9 Depression Screening            There is no height or weight on file to calculate BMI. BMI Counseling: There is no height or weight on file to calculate BMI. The BMI   Subjective:      Patient ID: Lio Lucero is a 79 y.o. female.     Patient presents accompanied by her healthcare aide      The following portions of the patient's history were reviewed and updated as appropriate:   She has a past medical history of CVA (cerebral vascular accident) ([address]), CVA, old, aphasia, Depression, Hypertension, Hypertensive heart disease with congestive heart failure ([address]) (2020), Medial meniscus tear, Mitral valve prolapse, Spastic hemiplegia of right dominant side as late effect of cerebral infarction ([address]) (2019), and Stroke [facility]). ,  does not have any pertinent problems on file. ,   has a past surgical history that includes Hysterectomy;  section; Tonsillectomy; Fracture surgery; and pr optx fem shft fx w/insj imed implt w/wo screw (Left, 2017). ,  family history includes Arthritis in her mother; Heart attack in her father; Heart disease in her father. ,   reports that she has never smoked. She has never used smokeless tobacco. She reports that she does not currently use alcohol. She reports that she does not use drugs. ,  has No Known Allergies. .  Current Outpatient Medications   Medication Sig Dispense Refill   • Ascorbic Acid (VITAMIN C PO) Take by mouth     • aspirin 325 mg tablet Take 325 mg by mouth daily     • atorvastatin (LIPITOR) 80 mg tablet Take 1 tablet (80 mg total) by mouth daily 90 tablet 1   • cholecalciferol (VITAMIN D3) 1,000 units tablet Take 2,000 Units by mouth daily      • escitalopram (LEXAPRO) 10 mg tablet Take 1 tablet (10 mg total) by mouth daily 90 tablet 1   • ibuprofen (MOTRIN) 600 mg tablet TAKE 1 TABLET EVERY 8 HOURS AS NEEDED FOR MILD PAIN OR MODERATE PAIN FOR UP TO 5 DAYS 270 tablet 3   • losartan (Cozaar) 50 mg tablet Take 1 tablet (50 mg total) by mouth daily 30 tablet 0   • oxybutynin (DITROPAN) 5 mg tablet Take 1 tablet (5 mg total) by mouth 3 (three) times a day 90 tablet 3   • potassium chloride (Klor-Con M20) 20 mEq tablet Take 1 tablet (20 mEq total) by mouth daily 90 tablet 1   • CALCIUM PO Take by mouth     • HYDROcodone-acetaminophen (Norco) 5-325 mg per tablet Take 1 tablet by mouth 2 (two) times a day as needed for pain Max Daily Amount: 2 tablets 10 tablet 0   • Incontinence Supply Disposable (A + D Personal Care Wipes) MISC Use daily 2 each 11   • Misc. Devices (Wheelchair Cushion) [address] by Does not apply route daily Combo cushion 1 each 0   • nitrofurantoin (MACRODANTIN) 100 mg capsule Take 100 mg by mouth 4 (four) times a day     • ondansetron (ZOFRAN-ODT) 4 mg disintegrating tablet Take 1 tablet (4 mg total) by mouth every 8 (eight) hours as needed for nausea or vomiting for up to 20 doses 20 tablet 0     No current facility-administered medications for this visit. Review of Systems   Constitutional: Negative. HENT: Negative. Eyes: Negative. Respiratory: Negative. Cardiovascular: Negative. Gastrointestinal: Negative. Endocrine: Negative. Genitourinary: Negative. Musculoskeletal: Negative. Skin: Negative. Allergic/Immunologic: Negative. Neurological: Negative. Hematological: Negative. Psychiatric/Behavioral: Negative. Objective:    /98 (BP Location: Left arm, Patient Position: Sitting, Cuff Size: Standard)   Pulse 76   Resp 16   LMP  (LMP Unknown)   SpO2 96%   There is no height or weight on file to calculate BMI. Physical Exam  Constitutional:       Appearance: Normal appearance. She is well-developed. HENT:      Head: Normocephalic and atraumatic. Right Ear: Tympanic membrane, ear canal and external ear normal.      Left Ear: Tympanic membrane, ear canal and external ear normal.      Nose: Nose normal.      Mouth/Throat:      Mouth: Mucous membranes are moist.      Pharynx: Oropharynx is clear. Eyes:      Extraocular Movements: Extraocular movements intact. Conjunctiva/sclera: Conjunctivae normal.      Pupils: Pupils are equal, round, and reactive to light. Cardiovascular:      Rate and Rhythm: Normal rate and regular rhythm. Pulses: Normal pulses. Heart sounds: Normal heart sounds.    Pulmonary:      Effort: Pulmonary effort is normal.      Breath sounds: Normal breath sounds. Abdominal:      General: Abdomen is flat. Bowel sounds are normal.      Palpations: Abdomen is soft. Tenderness: There is no abdominal tenderness. Musculoskeletal:         General: Normal range of motion. Cervical back: Normal range of motion and neck supple. Skin:     General: Skin is warm and dry. Capillary Refill: Capillary refill takes less than 2 seconds. Neurological:      Mental Status: She is alert and oriented to person, place, and time. Mental status is at baseline. Comments: Right-sided hemiplegia aphasic   Psychiatric:         Mood and Affect: Mood normal.         Behavior: Behavior normal.         Thought Content:  Thought content normal.         Judgment: Judgment normal.

## 2023-07-25 ENCOUNTER — TELEPHONE (OUTPATIENT)
Dept: LAB | Facility: HOSPITAL | Age: 68
End: 2023-07-25

## 2023-07-27 ENCOUNTER — TELEPHONE (OUTPATIENT)
Dept: FAMILY MEDICINE CLINIC | Facility: CLINIC | Age: 68
End: 2023-07-27

## 2023-07-27 DIAGNOSIS — N39.41 URGENCY INCONTINENCE: ICD-10-CM

## 2023-07-27 RX ORDER — OXYBUTYNIN CHLORIDE 5 MG/1
5 TABLET ORAL 3 TIMES DAILY
Qty: 90 TABLET | Refills: 0 | Status: SHIPPED | OUTPATIENT
Start: 2023-07-27

## 2023-07-27 RX ORDER — OXYBUTYNIN CHLORIDE 5 MG/1
5 TABLET ORAL 3 TIMES DAILY
Qty: 90 TABLET | Refills: 1 | Status: SHIPPED | OUTPATIENT
Start: 2023-07-27

## 2023-07-27 NOTE — TELEPHONE ENCOUNTER
Patients caregiver states taht patient is out of ditropan, if she could have a few to the pharmacy [facility] and the rest to [facility]

## 2023-08-01 ENCOUNTER — TELEPHONE (OUTPATIENT)
Dept: FAMILY MEDICINE CLINIC | Facility: CLINIC | Age: 68
End: 2023-08-01

## 2023-08-01 DIAGNOSIS — I69.351 SPASTIC HEMIPLEGIA OF RIGHT DOMINANT SIDE AS LATE EFFECT OF CEREBRAL INFARCTION (HCC): ICD-10-CM

## 2023-08-01 DIAGNOSIS — Z86.73 H/O ISCHEMIC LEFT MCA STROKE: ICD-10-CM

## 2023-08-01 DIAGNOSIS — N39.41 URGENCY INCONTINENCE: Primary | ICD-10-CM

## 2023-08-01 RX ORDER — DIAPER,BRIEF,ADULT, DISPOSABLE
EACH MISCELLANEOUS
Qty: 300 EACH | Refills: 11 | Status: SHIPPED | OUTPATIENT
Start: 2023-08-01

## 2023-08-01 NOTE — TELEPHONE ENCOUNTER
Patient needs to have a new Rx for incontinent products sent to new [facility] - fax [phone]

## 2023-08-08 ENCOUNTER — APPOINTMENT (OUTPATIENT)
Dept: LAB | Facility: HOSPITAL | Age: 68
End: 2023-08-08
Payer: COMMERCIAL

## 2023-08-08 DIAGNOSIS — E55.9 VITAMIN D DEFICIENCY: ICD-10-CM

## 2023-08-08 DIAGNOSIS — Z86.73 H/O ISCHEMIC LEFT MCA STROKE: ICD-10-CM

## 2023-08-08 DIAGNOSIS — Z00.00 ANNUAL PHYSICAL EXAM: ICD-10-CM

## 2023-08-08 PROCEDURE — 80061 LIPID PANEL: CPT

## 2023-08-08 PROCEDURE — 80053 COMPREHEN METABOLIC PANEL: CPT

## 2023-08-08 PROCEDURE — 85025 COMPLETE CBC W/AUTO DIFF WBC: CPT

## 2023-08-08 PROCEDURE — 82306 VITAMIN D 25 HYDROXY: CPT

## 2023-08-08 PROCEDURE — 36415 COLL VENOUS BLD VENIPUNCTURE: CPT

## 2023-08-08 PROCEDURE — 84443 ASSAY THYROID STIM HORMONE: CPT

## 2023-08-08 RX ORDER — LOSARTAN POTASSIUM 50 MG/1
50 TABLET ORAL DAILY
Qty: 15 TABLET | Refills: 0 | Status: SHIPPED | OUTPATIENT
Start: 2023-08-08

## 2023-08-08 RX ORDER — LOSARTAN POTASSIUM 50 MG/1
50 TABLET ORAL DAILY
Qty: 90 TABLET | Refills: 1 | Status: SHIPPED | OUTPATIENT
Start: 2023-08-08

## 2023-08-09 LAB
25(OH)D3 SERPL-MCNC: 49.5 NG/ML (ref 30–100)
ALBUMIN SERPL BCP-MCNC: 3.7 G/DL (ref 3.5–5)
ALP SERPL-CCNC: 128 U/L (ref 46–116)
ALT SERPL W P-5'-P-CCNC: 25 U/L (ref 12–78)
ANION GAP SERPL CALCULATED.3IONS-SCNC: 6 MMOL/L
AST SERPL W P-5'-P-CCNC: 20 U/L (ref 5–45)
BASOPHILS # BLD AUTO: 0.03 THOUSANDS/ÂΜL (ref 0–0.1)
BASOPHILS NFR BLD AUTO: 0 % (ref 0–1)
BILIRUB SERPL-MCNC: 0.65 MG/DL (ref 0.2–1)
BUN SERPL-MCNC: 13 MG/DL (ref 5–25)
CALCIUM SERPL-MCNC: 9.4 MG/DL (ref 8.3–10.1)
CHLORIDE SERPL-SCNC: 109 MMOL/L (ref 96–108)
CHOLEST SERPL-MCNC: 117 MG/DL
CO2 SERPL-SCNC: 28 MMOL/L (ref 21–32)
CREAT SERPL-MCNC: 0.86 MG/DL (ref 0.6–1.3)
EOSINOPHIL # BLD AUTO: 0.1 THOUSAND/ÂΜL (ref 0–0.61)
EOSINOPHIL NFR BLD AUTO: 1 % (ref 0–6)
ERYTHROCYTE [DISTWIDTH] IN BLOOD BY AUTOMATED COUNT: 13.4 % (ref 11.6–15.1)
GFR SERPL CREATININE-BSD FRML MDRD: 70 ML/MIN/1.73SQ M
GLUCOSE P FAST SERPL-MCNC: 68 MG/DL (ref 65–99)
HCT VFR BLD AUTO: 46.8 % (ref 34.8–46.1)
HDLC SERPL-MCNC: 48 MG/DL
HGB BLD-MCNC: 14 G/DL (ref 11.5–15.4)
IMM GRANULOCYTES # BLD AUTO: 0.02 THOUSAND/UL (ref 0–0.2)
IMM GRANULOCYTES NFR BLD AUTO: 0 % (ref 0–2)
LDLC SERPL CALC-MCNC: 41 MG/DL (ref 0–100)
LYMPHOCYTES # BLD AUTO: 2.8 THOUSANDS/ÂΜL (ref 0.6–4.47)
LYMPHOCYTES NFR BLD AUTO: 35 % (ref 14–44)
MCH RBC QN AUTO: 29.7 PG (ref 26.8–34.3)
MCHC RBC AUTO-ENTMCNC: 29.9 G/DL (ref 31.4–37.4)
MCV RBC AUTO: 99 FL (ref 82–98)
MONOCYTES # BLD AUTO: 0.51 THOUSAND/ÂΜL (ref 0.17–1.22)
MONOCYTES NFR BLD AUTO: 6 % (ref 4–12)
NEUTROPHILS # BLD AUTO: 4.6 THOUSANDS/ÂΜL (ref 1.85–7.62)
NEUTS SEG NFR BLD AUTO: 58 % (ref 43–75)
NONHDLC SERPL-MCNC: 69 MG/DL
NRBC BLD AUTO-RTO: 0 /100 WBCS
PLATELET # BLD AUTO: 330 THOUSANDS/UL (ref 149–390)
PMV BLD AUTO: 10.6 FL (ref 8.9–12.7)
POTASSIUM SERPL-SCNC: 4.2 MMOL/L (ref 3.5–5.3)
PROT SERPL-MCNC: 7.7 G/DL (ref 6.4–8.4)
RBC # BLD AUTO: 4.71 MILLION/UL (ref 3.81–5.12)
SODIUM SERPL-SCNC: 143 MMOL/L (ref 135–147)
TRIGL SERPL-MCNC: 139 MG/DL
TSH SERPL DL<=0.05 MIU/L-ACNC: 3.24 UIU/ML (ref 0.45–4.5)
WBC # BLD AUTO: 8.06 THOUSAND/UL (ref 4.31–10.16)

## 2023-08-10 ENCOUNTER — TELEPHONE (OUTPATIENT)
Dept: FAMILY MEDICINE CLINIC | Facility: CLINIC | Age: 68
End: 2023-08-10

## 2023-08-10 DIAGNOSIS — E61.1 IRON DEFICIENCY: Primary | ICD-10-CM

## 2023-08-10 NOTE — TELEPHONE ENCOUNTER
Isolated alkaline phosphatase needs probably from bone mildly avid liver enzymes are normal.  Chloride of 109 is not an issue. MCV and MCHC need to be followed with a repeat count probably about 4 months.   Will obtain an iron panel with next scheduled

## 2023-08-31 DIAGNOSIS — I11.0 HYPERTENSIVE HEART DISEASE WITH CONGESTIVE HEART FAILURE, UNSPECIFIED HEART FAILURE TYPE (HCC): ICD-10-CM

## 2023-08-31 RX ORDER — POTASSIUM CHLORIDE 1500 MG/1
20 TABLET, EXTENDED RELEASE ORAL DAILY
Qty: 90 TABLET | Refills: 3 | Status: SHIPPED | OUTPATIENT
Start: 2023-08-31

## 2023-10-26 ENCOUNTER — TELEPHONE (OUTPATIENT)
Dept: LAB | Facility: HOSPITAL | Age: 68
End: 2023-10-26

## 2023-10-31 ENCOUNTER — APPOINTMENT (OUTPATIENT)
Dept: LAB | Facility: HOSPITAL | Age: 68
End: 2023-10-31
Payer: COMMERCIAL

## 2023-10-31 DIAGNOSIS — E61.1 IRON DEFICIENCY: ICD-10-CM

## 2023-10-31 LAB
ALBUMIN SERPL BCP-MCNC: 4.2 G/DL (ref 3.5–5)
ALP SERPL-CCNC: 140 U/L (ref 34–104)
ALT SERPL W P-5'-P-CCNC: 18 U/L (ref 7–52)
ANION GAP SERPL CALCULATED.3IONS-SCNC: 6 MMOL/L
AST SERPL W P-5'-P-CCNC: 16 U/L (ref 13–39)
BACTERIA UR QL AUTO: ABNORMAL /HPF
BASOPHILS # BLD AUTO: 0.04 THOUSANDS/ÂΜL (ref 0–0.1)
BASOPHILS NFR BLD AUTO: 1 % (ref 0–1)
BILIRUB SERPL-MCNC: 0.5 MG/DL (ref 0.2–1)
BILIRUB UR QL STRIP: NEGATIVE
BUN SERPL-MCNC: 19 MG/DL (ref 5–25)
CALCIUM SERPL-MCNC: 8.9 MG/DL (ref 8.4–10.2)
CHLORIDE SERPL-SCNC: 105 MMOL/L (ref 96–108)
CLARITY UR: CLEAR
CO2 SERPL-SCNC: 31 MMOL/L (ref 21–32)
COLOR UR: ABNORMAL
CREAT SERPL-MCNC: 0.68 MG/DL (ref 0.6–1.3)
CREAT UR-MCNC: 114.7 MG/DL
EOSINOPHIL # BLD AUTO: 0.11 THOUSAND/ÂΜL (ref 0–0.61)
EOSINOPHIL NFR BLD AUTO: 1 % (ref 0–6)
ERYTHROCYTE [DISTWIDTH] IN BLOOD BY AUTOMATED COUNT: 13.6 % (ref 11.6–15.1)
FERRITIN SERPL-MCNC: 33 NG/ML (ref 11–307)
GFR SERPL CREATININE-BSD FRML MDRD: 90 ML/MIN/1.73SQ M
GLUCOSE P FAST SERPL-MCNC: 84 MG/DL (ref 65–99)
GLUCOSE UR STRIP-MCNC: NEGATIVE MG/DL
HCT VFR BLD AUTO: 42.9 % (ref 34.8–46.1)
HGB BLD-MCNC: 13.3 G/DL (ref 11.5–15.4)
HGB UR QL STRIP.AUTO: NEGATIVE
IMM GRANULOCYTES # BLD AUTO: 0.01 THOUSAND/UL (ref 0–0.2)
IMM GRANULOCYTES NFR BLD AUTO: 0 % (ref 0–2)
IRON SATN MFR SERPL: 14 % (ref 15–50)
IRON SERPL-MCNC: 68 UG/DL (ref 50–212)
KETONES UR STRIP-MCNC: NEGATIVE MG/DL
LEUKOCYTE ESTERASE UR QL STRIP: NEGATIVE
LYMPHOCYTES # BLD AUTO: 2.78 THOUSANDS/ÂΜL (ref 0.6–4.47)
LYMPHOCYTES NFR BLD AUTO: 33 % (ref 14–44)
MCH RBC QN AUTO: 30 PG (ref 26.8–34.3)
MCHC RBC AUTO-ENTMCNC: 31 G/DL (ref 31.4–37.4)
MCV RBC AUTO: 97 FL (ref 82–98)
MICROALBUMIN UR-MCNC: 7.3 MG/L
MICROALBUMIN/CREAT 24H UR: 6 MG/G CREATININE (ref 0–30)
MONOCYTES # BLD AUTO: 0.53 THOUSAND/ÂΜL (ref 0.17–1.22)
MONOCYTES NFR BLD AUTO: 6 % (ref 4–12)
MUCOUS THREADS UR QL AUTO: ABNORMAL
NEUTROPHILS # BLD AUTO: 4.94 THOUSANDS/ÂΜL (ref 1.85–7.62)
NEUTS SEG NFR BLD AUTO: 59 % (ref 43–75)
NITRITE UR QL STRIP: POSITIVE
NON-SQ EPI CELLS URNS QL MICRO: ABNORMAL /HPF
NRBC BLD AUTO-RTO: 0 /100 WBCS
PH UR STRIP.AUTO: 6 [PH]
PLATELET # BLD AUTO: 316 THOUSANDS/UL (ref 149–390)
PMV BLD AUTO: 10.8 FL (ref 8.9–12.7)
POTASSIUM SERPL-SCNC: 4.7 MMOL/L (ref 3.5–5.3)
PROT SERPL-MCNC: 6.9 G/DL (ref 6.4–8.4)
PROT UR STRIP-MCNC: NEGATIVE MG/DL
RBC # BLD AUTO: 4.44 MILLION/UL (ref 3.81–5.12)
RBC #/AREA URNS AUTO: ABNORMAL /HPF
SODIUM SERPL-SCNC: 142 MMOL/L (ref 135–147)
SP GR UR STRIP.AUTO: 1.02 (ref 1–1.03)
TIBC SERPL-MCNC: 478 UG/DL (ref 250–450)
UIBC SERPL-MCNC: 410 UG/DL (ref 155–355)
UROBILINOGEN UR STRIP-ACNC: <2 MG/DL
WBC # BLD AUTO: 8.41 THOUSAND/UL (ref 4.31–10.16)
WBC #/AREA URNS AUTO: ABNORMAL /HPF

## 2023-10-31 PROCEDURE — 82728 ASSAY OF FERRITIN: CPT

## 2023-10-31 PROCEDURE — 36415 COLL VENOUS BLD VENIPUNCTURE: CPT

## 2023-10-31 PROCEDURE — 85025 COMPLETE CBC W/AUTO DIFF WBC: CPT

## 2023-10-31 PROCEDURE — 83550 IRON BINDING TEST: CPT

## 2023-10-31 PROCEDURE — 80053 COMPREHEN METABOLIC PANEL: CPT

## 2023-10-31 PROCEDURE — 82043 UR ALBUMIN QUANTITATIVE: CPT

## 2023-10-31 PROCEDURE — 83540 ASSAY OF IRON: CPT

## 2023-10-31 PROCEDURE — 82570 ASSAY OF URINE CREATININE: CPT

## 2023-11-01 DIAGNOSIS — E78.2 MIXED HYPERLIPIDEMIA: ICD-10-CM

## 2023-11-01 DIAGNOSIS — F06.31 DEPRESSION AS LATE EFFECT OF CEREBROVASCULAR ACCIDENT (CVA): ICD-10-CM

## 2023-11-01 DIAGNOSIS — I69.398 DEPRESSION AS LATE EFFECT OF CEREBROVASCULAR ACCIDENT (CVA): ICD-10-CM

## 2023-11-01 RX ORDER — ATORVASTATIN CALCIUM 80 MG/1
80 TABLET, FILM COATED ORAL DAILY
Qty: 90 TABLET | Refills: 3 | Status: SHIPPED | OUTPATIENT
Start: 2023-11-01

## 2023-11-01 RX ORDER — ESCITALOPRAM OXALATE 10 MG/1
10 TABLET ORAL DAILY
Qty: 90 TABLET | Refills: 3 | Status: SHIPPED | OUTPATIENT
Start: 2023-11-01

## 2023-11-16 ENCOUNTER — OFFICE VISIT (OUTPATIENT)
Dept: FAMILY MEDICINE CLINIC | Facility: CLINIC | Age: 68
End: 2023-11-16
Payer: COMMERCIAL

## 2023-11-16 VITALS
RESPIRATION RATE: 20 BRPM | SYSTOLIC BLOOD PRESSURE: 134 MMHG | HEART RATE: 84 BPM | TEMPERATURE: 98.9 F | DIASTOLIC BLOOD PRESSURE: 76 MMHG

## 2023-11-16 DIAGNOSIS — J40 BRONCHITIS: ICD-10-CM

## 2023-11-16 DIAGNOSIS — I69.398 DEPRESSION AS LATE EFFECT OF CEREBROVASCULAR ACCIDENT (CVA): ICD-10-CM

## 2023-11-16 DIAGNOSIS — E78.2 MIXED HYPERLIPIDEMIA: ICD-10-CM

## 2023-11-16 DIAGNOSIS — J01.00 ACUTE MAXILLARY SINUSITIS, RECURRENCE NOT SPECIFIED: Primary | ICD-10-CM

## 2023-11-16 DIAGNOSIS — Z86.73 H/O ISCHEMIC LEFT MCA STROKE: ICD-10-CM

## 2023-11-16 DIAGNOSIS — I10 HYPERTENSION, UNSPECIFIED TYPE: ICD-10-CM

## 2023-11-16 DIAGNOSIS — J02.9 PHARYNGITIS, UNSPECIFIED ETIOLOGY: ICD-10-CM

## 2023-11-16 DIAGNOSIS — I69.351 SPASTIC HEMIPLEGIA OF RIGHT DOMINANT SIDE AS LATE EFFECT OF CEREBRAL INFARCTION (HCC): ICD-10-CM

## 2023-11-16 DIAGNOSIS — F06.31 DEPRESSION AS LATE EFFECT OF CEREBROVASCULAR ACCIDENT (CVA): ICD-10-CM

## 2023-11-16 PROCEDURE — 99214 OFFICE O/P EST MOD 30 MIN: CPT | Performed by: FAMILY MEDICINE

## 2023-11-16 RX ORDER — BENZONATATE 200 MG/1
200 CAPSULE ORAL 3 TIMES DAILY PRN
Qty: 20 CAPSULE | Refills: 0 | Status: SHIPPED | OUTPATIENT
Start: 2023-11-16

## 2023-11-16 RX ORDER — METHYLPREDNISOLONE 4 MG/1
TABLET ORAL
Qty: 21 EACH | Refills: 0 | Status: SHIPPED | OUTPATIENT
Start: 2023-11-16

## 2023-11-16 RX ORDER — AZITHROMYCIN 250 MG/1
TABLET, FILM COATED ORAL
Qty: 6 TABLET | Refills: 0 | Status: SHIPPED | OUTPATIENT
Start: 2023-11-16 | End: 2023-11-21

## 2023-11-16 NOTE — PROGRESS NOTES
Assessment/Plan: Acute maxillary sinusitis pharyngitis and bronchitis we will treat with a Medrol Dosepak Zithromax Z-Otto and Tessalon Perles    Hypertensive cardiovascular disease with a blood pressure controlled on the current regimen    Dyslipidemia on atorvastatin 80 mg    Depression improved with Lexapro 10 mg    Right-sided spastic hemiplegia and aphasia. The patient is wheelchair-bound, neurologically she is at baseline. The patient has a history of a left MCA stroke    Problem List Items Addressed This Visit     Hypertension    H/O ischemic left MCA stroke    Spastic hemiplegia of right dominant side as late effect of cerebral infarction (HCC) (Chronic)   Other Visit Diagnoses     Acute maxillary sinusitis, recurrence not specified    -  Primary    Relevant Medications    azithromycin (Zithromax) 250 mg tablet    benzonatate (TESSALON) 200 MG capsule    methylPREDNISolone 4 MG tablet therapy pack    Pharyngitis, unspecified etiology        Relevant Medications    azithromycin (Zithromax) 250 mg tablet    benzonatate (TESSALON) 200 MG capsule    methylPREDNISolone 4 MG tablet therapy pack    Bronchitis        Relevant Medications    azithromycin (Zithromax) 250 mg tablet    benzonatate (TESSALON) 200 MG capsule    methylPREDNISolone 4 MG tablet therapy pack    Mixed hyperlipidemia        Depression as late effect of cerebrovascular accident (CVA)                 Diagnoses and all orders for this visit:    Acute maxillary sinusitis, recurrence not specified  -     azithromycin (Zithromax) 250 mg tablet; Take 2 tablets (500 mg total) by mouth daily for 1 day, THEN 1 tablet (250 mg total) daily for 4 days. -     benzonatate (TESSALON) 200 MG capsule; Take 1 capsule (200 mg total) by mouth 3 (three) times a day as needed for cough  -     methylPREDNISolone 4 MG tablet therapy pack; Use as directed on package    Pharyngitis, unspecified etiology  -     azithromycin (Zithromax) 250 mg tablet;  Take 2 tablets (500 mg total) by mouth daily for 1 day, THEN 1 tablet (250 mg total) daily for 4 days. -     benzonatate (TESSALON) 200 MG capsule; Take 1 capsule (200 mg total) by mouth 3 (three) times a day as needed for cough  -     methylPREDNISolone 4 MG tablet therapy pack; Use as directed on package    Bronchitis  -     azithromycin (Zithromax) 250 mg tablet; Take 2 tablets (500 mg total) by mouth daily for 1 day, THEN 1 tablet (250 mg total) daily for 4 days. -     benzonatate (TESSALON) 200 MG capsule; Take 1 capsule (200 mg total) by mouth 3 (three) times a day as needed for cough  -     methylPREDNISolone 4 MG tablet therapy pack; Use as directed on package    Hypertension, unspecified type    Mixed hyperlipidemia    Depression as late effect of cerebrovascular accident (CVA)    H/O ischemic left MCA stroke    Spastic hemiplegia of right dominant side as late effect of cerebral infarction ([address])        No problem-specific Assessment & Plan notes found for this encounter. PHQ-2/9 Depression Screening    Little interest or pleasure in doing things: 0 - not at all  Feeling down, depressed, or hopeless: 0 - not at all  PHQ-2 Score: 0  PHQ-2 Interpretation: Negative depression screen          There is no height or weight on file to calculate BMI. BMI Counseling: There is no height or weight on file to calculate BMI. The BMI     Subjective:      Patient ID: Thomas Oglesby is a 79 y.o. female. Patient presents with a 2-day history of cold-like symptoms with a cough productive of yellowish phlegm sore throat and nasal congestion    Sore Throat   Associated symptoms include congestion and coughing. Pertinent negatives include no abdominal pain, ear pain, shortness of breath or vomiting.        The following portions of the patient's history were reviewed and updated as appropriate:   She has a past medical history of CVA (cerebral vascular accident) ([address]), CVA, old, aphasia, Depression, Hypertension, Hypertensive heart disease with congestive heart failure ([address]) (2020), Medial meniscus tear, Mitral valve prolapse, Spastic hemiplegia of right dominant side as late effect of cerebral infarction ([address]) (2019), and Stroke [facility]). ,  does not have any pertinent problems on file. ,   has a past surgical history that includes Hysterectomy;  section; Tonsillectomy; Fracture surgery; and pr optx fem shft fx w/insj imed implt w/wo screw (Left, 2017). ,  family history includes Arthritis in her mother; Heart attack in her father; Heart disease in her father. ,   reports that she has never smoked. She has never used smokeless tobacco. She reports that she does not currently use alcohol. She reports that she does not use drugs. ,  has No Known Allergies. .  Current Outpatient Medications   Medication Sig Dispense Refill   • azithromycin (Zithromax) 250 mg tablet Take 2 tablets (500 mg total) by mouth daily for 1 day, THEN 1 tablet (250 mg total) daily for 4 days.  6 tablet 0   • benzonatate (TESSALON) 200 MG capsule Take 1 capsule (200 mg total) by mouth 3 (three) times a day as needed for cough 20 capsule 0   • methylPREDNISolone 4 MG tablet therapy pack Use as directed on package 21 each 0   • Ascorbic Acid (VITAMIN C PO) Take by mouth     • aspirin 325 mg tablet Take 325 mg by mouth daily     • atorvastatin (LIPITOR) 80 mg tablet TAKE 1 TABLET DAILY 90 tablet 3   • CALCIUM PO Take by mouth     • cholecalciferol (VITAMIN D3) 1,000 units tablet Take 2,000 Units by mouth daily      • escitalopram (LEXAPRO) 10 mg tablet TAKE 1 TABLET DAILY 90 tablet 3   • HYDROcodone-acetaminophen (Norco) 5-325 mg per tablet Take 1 tablet by mouth 2 (two) times a day as needed for pain Max Daily Amount: 2 tablets 10 tablet 0   • ibuprofen (MOTRIN) 600 mg tablet TAKE 1 TABLET EVERY 8 HOURS AS NEEDED FOR MILD PAIN OR MODERATE PAIN FOR UP TO 5 DAYS 270 tablet 3   • Incontinence Supplies MISC Use in the morning 12 each 11   • Incontinence Supply Disposable (A + D Personal Care Wipes) MISC Use daily 2 each 11   • Incontinence Supply Disposable (Disposable Brief X-Large) MISC Use 6 (six) times a day 300 each 11   • Incontinence Supply Disposable MISC Use daily DISPOSABLE BED PADS 60 each 11   • Klor-Con M20 20 MEQ tablet TAKE 1 TABLET DAILY 90 tablet 3   • losartan (Cozaar) 50 mg tablet Take 1 tablet (50 mg total) by mouth daily 15 tablet 0   • losartan (COZAAR) 50 mg tablet Take 1 tablet (50 mg total) by mouth daily 90 tablet 1   • Misc. Devices (Wheelchair Cushion) Bertram Logan by Does not apply route daily Combo cushion 1 each 0   • nitrofurantoin (MACRODANTIN) 100 mg capsule Take 100 mg by mouth 4 (four) times a day     • ondansetron (ZOFRAN-ODT) 4 mg disintegrating tablet Take 1 tablet (4 mg total) by mouth every 8 (eight) hours as needed for nausea or vomiting for up to 20 doses 20 tablet 0   • oxybutynin (DITROPAN) 5 mg tablet Take 1 tablet (5 mg total) by mouth 3 (three) times a day 90 tablet 1   • oxybutynin (DITROPAN) 5 mg tablet Take 1 tablet (5 mg total) by mouth 3 (three) times a day 90 tablet 0     No current facility-administered medications for this visit. Review of Systems   Constitutional:  Negative for chills and fever. HENT:  Positive for congestion, postnasal drip, sinus pressure, sinus pain and sore throat. Negative for ear pain. Eyes:  Negative for pain and visual disturbance. Respiratory:  Positive for cough. Negative for shortness of breath. Cardiovascular:  Negative for chest pain and palpitations. Gastrointestinal:  Negative for abdominal pain and vomiting. Genitourinary:  Negative for dysuria and hematuria. Musculoskeletal:  Negative for arthralgias and back pain. Skin:  Negative for color change and rash. Neurological:  Negative for seizures and syncope. All other systems reviewed and are negative.         Objective:    /76   Pulse 84   Temp 98.9 °F (37.2 °C)   Resp 20   LMP  (LMP Unknown)   There is no height or weight on file to calculate BMI. Physical Exam  Constitutional:       Appearance: Normal appearance. She is well-developed. HENT:      Head: Normocephalic and atraumatic. Right Ear: Tympanic membrane, ear canal and external ear normal.      Left Ear: Tympanic membrane, ear canal and external ear normal.      Nose:      Right Sinus: Maxillary sinus tenderness present. Left Sinus: Maxillary sinus tenderness present. Mouth/Throat:      Mouth: Mucous membranes are moist.      Pharynx: Oropharynx is clear. Posterior oropharyngeal erythema present. Eyes:      Extraocular Movements: Extraocular movements intact. Conjunctiva/sclera: Conjunctivae normal.      Pupils: Pupils are equal, round, and reactive to light. Cardiovascular:      Rate and Rhythm: Normal rate and regular rhythm. Pulses: Normal pulses. Heart sounds: Normal heart sounds. Pulmonary:      Effort: Pulmonary effort is normal.      Breath sounds: Normal breath sounds. Abdominal:      General: Abdomen is flat. Bowel sounds are normal.      Palpations: Abdomen is soft. Tenderness: There is no abdominal tenderness. Musculoskeletal:         General: Normal range of motion. Cervical back: Normal range of motion and neck supple. Skin:     General: Skin is warm and dry. Capillary Refill: Capillary refill takes less than 2 seconds. Neurological:      General: No focal deficit present. Mental Status: She is alert and oriented to person, place, and time. Comments: Right-sided spastic hemiplegia with aphasia   Psychiatric:         Mood and Affect: Mood normal.         Behavior: Behavior normal.         Thought Content:  Thought content normal.         Judgment: Judgment normal.

## 2023-11-16 NOTE — PROGRESS NOTES
Assessment/Plan:    Problem List Items Addressed This Visit    None       There are no diagnoses linked to this encounter. No problem-specific Assessment & Plan notes found for this encounter. PHQ-2/9 Depression Screening    Little interest or pleasure in doing things: 0 - not at all  Feeling down, depressed, or hopeless: 0 - not at all  PHQ-2 Score: 0  PHQ-2 Interpretation: Negative depression screen          There is no height or weight on file to calculate BMI. BMI Counseling: There is no height or weight on file to calculate BMI. The BMI {VB BMI Counselin}    Subjective:      Patient ID: Baldemar Ward is a 79 y.o. female. HPI    The following portions of the patient's history were reviewed and updated as appropriate:   She has a past medical history of CVA (cerebral vascular accident) ([address]), CVA, old, aphasia, Depression, Hypertension, Hypertensive heart disease with congestive heart failure ([address]) (2020), Medial meniscus tear, Mitral valve prolapse, Spastic hemiplegia of right dominant side as late effect of cerebral infarction ([address]) (2019), and Stroke ([address]). ,  does not have any pertinent problems on file. ,   has a past surgical history that includes Hysterectomy;  section; Tonsillectomy; Fracture surgery; and pr optx fem shft fx w/insj imed implt w/wo screw (Left, 2017). ,  family history includes Arthritis in her mother; Heart attack in her father; Heart disease in her father. ,   reports that she has never smoked. She has never used smokeless tobacco. She reports that she does not currently use alcohol. She reports that she does not use drugs. ,  has No Known Allergies. .  Current Outpatient Medications   Medication Sig Dispense Refill   • Ascorbic Acid (VITAMIN C PO) Take by mouth     • aspirin 325 mg tablet Take 325 mg by mouth daily     • atorvastatin (LIPITOR) 80 mg tablet TAKE 1 TABLET DAILY 90 tablet 3   • CALCIUM PO Take by mouth     • cholecalciferol (VITAMIN D3) 1,000 units tablet Take 2,000 Units by mouth daily      • escitalopram (LEXAPRO) 10 mg tablet TAKE 1 TABLET DAILY 90 tablet 3   • HYDROcodone-acetaminophen (Norco) 5-325 mg per tablet Take 1 tablet by mouth 2 (two) times a day as needed for pain Max Daily Amount: 2 tablets 10 tablet 0   • ibuprofen (MOTRIN) 600 mg tablet TAKE 1 TABLET EVERY 8 HOURS AS NEEDED FOR MILD PAIN OR MODERATE PAIN FOR UP TO 5 DAYS 270 tablet 3   • Incontinence Supplies MISC Use in the morning 12 each 11   • Incontinence Supply Disposable (A + D Personal Care Wipes) MISC Use daily 2 each 11   • Incontinence Supply Disposable (Disposable Brief X-Large) MISC Use 6 (six) times a day 300 each 11   • Incontinence Supply Disposable MISC Use daily DISPOSABLE BED PADS 60 each 11   • Klor-Con M20 20 MEQ tablet TAKE 1 TABLET DAILY 90 tablet 3   • losartan (Cozaar) 50 mg tablet Take 1 tablet (50 mg total) by mouth daily 15 tablet 0   • losartan (COZAAR) 50 mg tablet Take 1 tablet (50 mg total) by mouth daily 90 tablet 1   • Misc. Devices (Wheelchair Cushion) Bertram Logan by Does not apply route daily Combo cushion 1 each 0   • nitrofurantoin (MACRODANTIN) 100 mg capsule Take 100 mg by mouth 4 (four) times a day     • ondansetron (ZOFRAN-ODT) 4 mg disintegrating tablet Take 1 tablet (4 mg total) by mouth every 8 (eight) hours as needed for nausea or vomiting for up to 20 doses 20 tablet 0   • oxybutynin (DITROPAN) 5 mg tablet Take 1 tablet (5 mg total) by mouth 3 (three) times a day 90 tablet 1   • oxybutynin (DITROPAN) 5 mg tablet Take 1 tablet (5 mg total) by mouth 3 (three) times a day 90 tablet 0     No current facility-administered medications for this visit. Review of Systems      Objective:    /76   Pulse 84   Temp 98.9 °F (37.2 °C)   Resp 20   LMP  (LMP Unknown)   There is no height or weight on file to calculate BMI.      Physical Exam

## 2023-12-01 ENCOUNTER — TELEPHONE (OUTPATIENT)
Dept: FAMILY MEDICINE CLINIC | Facility: CLINIC | Age: 68
End: 2023-12-01

## 2023-12-01 DIAGNOSIS — N39.41 URGENCY INCONTINENCE: ICD-10-CM

## 2023-12-01 RX ORDER — OXYBUTYNIN CHLORIDE 5 MG/1
5 TABLET ORAL 3 TIMES DAILY
Qty: 90 TABLET | Refills: 1 | Status: SHIPPED | OUTPATIENT
Start: 2023-12-01

## 2023-12-01 NOTE — TELEPHONE ENCOUNTER
Patients caretaker [phone] states that she needs a refill on [facility], per the last script it appears that she has a refill if you can contact [facility] to maybe see why it isn't being refilled?

## 2024-01-17 DIAGNOSIS — Z86.73 H/O ISCHEMIC LEFT MCA STROKE: ICD-10-CM

## 2024-01-18 RX ORDER — LOSARTAN POTASSIUM 50 MG/1
50 TABLET ORAL DAILY
Qty: 90 TABLET | Refills: 0 | Status: SHIPPED | OUTPATIENT
Start: 2024-01-18

## 2024-02-02 ENCOUNTER — TELEPHONE (OUTPATIENT)
Dept: FAMILY MEDICINE CLINIC | Facility: CLINIC | Age: 69
End: 2024-02-02

## 2024-02-02 DIAGNOSIS — N39.41 URGENCY INCONTINENCE: ICD-10-CM

## 2024-02-02 DIAGNOSIS — I69.351 SPASTIC HEMIPLEGIA OF RIGHT DOMINANT SIDE AS LATE EFFECT OF CEREBRAL INFARCTION (HCC): ICD-10-CM

## 2024-02-02 DIAGNOSIS — Z86.73 H/O ISCHEMIC LEFT MCA STROKE: ICD-10-CM

## 2024-02-02 NOTE — TELEPHONE ENCOUNTER
Caregiver called requesting disposable briefs for patient. States requesting severe absorbency and size XL faxed to [employer] [phone]

## 2024-02-05 ENCOUNTER — TELEPHONE (OUTPATIENT)
Dept: FAMILY MEDICINE CLINIC | Facility: CLINIC | Age: 69
End: 2024-02-05

## 2024-02-05 DIAGNOSIS — R21 RASH: Primary | ICD-10-CM

## 2024-02-05 RX ORDER — DIAPER,BRIEF,ADULT, DISPOSABLE
EACH MISCELLANEOUS
Qty: 300 EACH | Refills: 11 | Status: SHIPPED | OUTPATIENT
Start: 2024-02-05 | End: 2024-02-05

## 2024-02-05 RX ORDER — NYSTATIN 100000 U/G
CREAM TOPICAL 2 TIMES DAILY
Qty: 30 G | Refills: 3 | Status: SHIPPED | OUTPATIENT
Start: 2024-02-05

## 2024-02-05 RX ORDER — DIAPER,BRIEF,ADULT, DISPOSABLE
EACH MISCELLANEOUS
Qty: 300 EACH | Refills: 11 | Status: SHIPPED | OUTPATIENT
Start: 2024-02-05

## 2024-02-05 NOTE — TELEPHONE ENCOUNTER
Caretaker reports rash under abdomen and under breasts , she states she had cream for it before , misael like a yeast infection, can that cream be ordered again

## 2024-02-21 PROBLEM — Z04.1 ENCOUNTER FOR EXAMINATION FOLLOWING MOTOR VEHICLE COLLISION (MVC): Status: RESOLVED | Noted: 2017-01-14 | Resolved: 2024-02-21

## 2024-03-29 ENCOUNTER — TELEPHONE (OUTPATIENT)
Dept: LAB | Facility: HOSPITAL | Age: 69
End: 2024-03-29

## 2024-03-29 ENCOUNTER — OFFICE VISIT (OUTPATIENT)
Dept: FAMILY MEDICINE CLINIC | Facility: CLINIC | Age: 69
End: 2024-03-29
Payer: COMMERCIAL

## 2024-03-29 VITALS
RESPIRATION RATE: 20 BRPM | TEMPERATURE: 98.4 F | SYSTOLIC BLOOD PRESSURE: 134 MMHG | DIASTOLIC BLOOD PRESSURE: 76 MMHG | HEART RATE: 84 BPM

## 2024-03-29 DIAGNOSIS — Z12.31 ENCOUNTER FOR SCREENING MAMMOGRAM FOR BREAST CANCER: ICD-10-CM

## 2024-03-29 DIAGNOSIS — I10 HYPERTENSION, UNSPECIFIED TYPE: ICD-10-CM

## 2024-03-29 DIAGNOSIS — N39.41 URGENCY INCONTINENCE: ICD-10-CM

## 2024-03-29 DIAGNOSIS — Z00.00 ANNUAL PHYSICAL EXAM: Primary | ICD-10-CM

## 2024-03-29 DIAGNOSIS — E55.9 VITAMIN D DEFICIENCY: ICD-10-CM

## 2024-03-29 DIAGNOSIS — I11.0 HYPERTENSIVE HEART DISEASE WITH CONGESTIVE HEART FAILURE, UNSPECIFIED HEART FAILURE TYPE (HCC): ICD-10-CM

## 2024-03-29 DIAGNOSIS — Z86.73 H/O ISCHEMIC LEFT MCA STROKE: ICD-10-CM

## 2024-03-29 DIAGNOSIS — F06.31 DEPRESSION AS LATE EFFECT OF CEREBROVASCULAR ACCIDENT (CVA): ICD-10-CM

## 2024-03-29 DIAGNOSIS — E87.6 HYPOKALEMIA: ICD-10-CM

## 2024-03-29 DIAGNOSIS — I69.398 DEPRESSION AS LATE EFFECT OF CEREBROVASCULAR ACCIDENT (CVA): ICD-10-CM

## 2024-03-29 DIAGNOSIS — I69.351 SPASTIC HEMIPLEGIA OF RIGHT DOMINANT SIDE AS LATE EFFECT OF CEREBRAL INFARCTION (HCC): ICD-10-CM

## 2024-03-29 DIAGNOSIS — E61.1 IRON DEFICIENCY: ICD-10-CM

## 2024-03-29 DIAGNOSIS — Z13.29 THYROID DISORDER SCREEN: ICD-10-CM

## 2024-03-29 DIAGNOSIS — E78.2 MIXED HYPERLIPIDEMIA: ICD-10-CM

## 2024-03-29 PROCEDURE — 99213 OFFICE O/P EST LOW 20 MIN: CPT | Performed by: FAMILY MEDICINE

## 2024-03-29 PROCEDURE — 99396 PREV VISIT EST AGE 40-64: CPT | Performed by: FAMILY MEDICINE

## 2024-03-29 NOTE — PROGRESS NOTES
ADULT ANNUAL PHYSICAL  [facility] PRIMARY CARE    NAME: Mariaelena Duarte  AGE: 68 y.o. SEX: female  : 1955     DATE: 3/29/2024     Assessment and Plan:     Problem List Items Addressed This Visit    None  Visit Diagnoses     Annual physical exam    -  Primary    Encounter for screening mammogram for breast cancer                Immunizations and preventive care screenings were discussed with patient today. Appropriate education was printed on patient's after visit summary.    Counseling:  Alcohol/drug use: discussed moderation in alcohol intake, the recommendations for healthy alcohol use, and avoidance of illicit drug use.  Dental Health: discussed importance of regular tooth brushing, flossing, and dental visits.  Injury prevention: discussed safety/seat belts, safety helmets, smoke detectors, carbon dioxide detectors, and smoking near bedding or upholstery.      Urinary Incontinence Plan of Care: counseling topics discussed: weight loss, preventing constipation and limiting fluid intake to 60 oz. per day.         No follow-ups on file.     Chief Complaint:     Chief Complaint   Patient presents with   • Annual Exam   • Follow-up   • Facial Swelling     Right sided facial swelling. Aide noticed today but aide has not been with patient for the last 5 days.       History of Present Illness:     Adult Annual Physical   Patient here for a comprehensive physical exam. The patient reports no problems.    Diet and Physical Activity  Diet/Nutrition: well balanced diet and limited junk food.   Exercise: no formal exercise.      Depression Screening  PHQ-2/9 Depression Screening         General Health  Sleep: sleeps well and gets 7-8 hours of sleep on average.   Hearing: normal - bilateral.  Vision: no vision problems.   Dental: regular dental visits and brushes teeth twice daily.       /GYN Health  Follows with gynecology? no   Patient is: postmenopausal  Last  menstrual periodyrs  Contraceptive method: menopause.    Advanced Care Planning  Do you have an advanced directive? yes  Do you have a durable medical power of ? yes  ACP document given to the patient? no     Review of Systems:     Review of Systems   Past Medical History:     Past Medical History:   Diagnosis Date   • CVA (cerebral vascular accident) ([facility])    • CVA, old, aphasia    • Depression    • Hypertension    • Hypertensive heart disease with congestive heart failure ([facility]) 2020   • Medial meniscus tear     left   • Mitral valve prolapse    • Spastic hemiplegia of right dominant side as late effect of cerebral infarction ([facility]) 2019   • Stroke ([facility])       Past Surgical History:     Past Surgical History:   Procedure Laterality Date   •  SECTION     • FRACTURE SURGERY     • HYSTERECTOMY     • MA OPTX FEM SHFT FX W/INSJ IMED IMPLT W/WO SCREW Left 2017    Procedure: INSERTION NAIL IM FEMUR ANTEGRADE (TROCHANTERIC);  Surgeon: Debbi Clark MD;  Location: [facility];  Service: Orthopedics   • TONSILLECTOMY        Social History:     Social History     Socioeconomic History   • Marital status:      Spouse name: None   • Number of children: None   • Years of education: None   • Highest education level: None   Occupational History   • None   Tobacco Use   • Smoking status: Never   • Smokeless tobacco: Never   Vaping Use   • Vaping status: Never Used   Substance and Sexual Activity   • Alcohol use: Not Currently   • Drug use: No   • Sexual activity: Not Currently   Other Topics Concern   • None   Social History Narrative   • None     Social Determinants of Health     Financial Resource Strain: Not on file   Food Insecurity: Not on file   Transportation Needs: Not on file   Physical Activity: Not on file   Stress: Not on file   Social Connections: Not on file   Intimate Partner Violence: Not on file   Housing Stability: Not on file      Family History:     Family History    Problem Relation Age of Onset   • Arthritis Mother    • Heart disease Father    • Heart attack Father       Current Medications:     Current Outpatient Medications   Medication Sig Dispense Refill   • Ascorbic Acid (VITAMIN C PO) Take by mouth     • aspirin 325 mg tablet Take 325 mg by mouth daily     • atorvastatin (LIPITOR) 80 mg tablet TAKE 1 TABLET DAILY 90 tablet 3   • benzonatate (TESSALON) 200 MG capsule Take 1 capsule (200 mg total) by mouth 3 (three) times a day as needed for cough (Patient not taking: Reported on 3/29/2024) 20 capsule 0   • CALCIUM PO Take by mouth     • cholecalciferol (VITAMIN D3) 1,000 units tablet Take 2,000 Units by mouth daily      • escitalopram (LEXAPRO) 10 mg tablet TAKE 1 TABLET DAILY 90 tablet 3   • HYDROcodone-acetaminophen (Norco) 5-325 mg per tablet Take 1 tablet by mouth 2 (two) times a day as needed for pain Max Daily Amount: 2 tablets 10 tablet 0   • ibuprofen (MOTRIN) 600 mg tablet TAKE 1 TABLET EVERY 8 HOURS AS NEEDED FOR MILD PAIN OR MODERATE PAIN FOR UP TO 5 DAYS 270 tablet 3   • Incontinence Supplies MISC Use in the morning 12 each 11   • Incontinence Supply Disposable (A + D Personal Care Wipes) MISC Use daily 2 each 11   • Incontinence Supply Disposable (Disposable Brief X-Large) MISC Use 6 (six) times a day 300 each 11   • Incontinence Supply Disposable MISC Use daily DISPOSABLE BED PADS 60 each 11   • Klor-Con M20 20 MEQ tablet TAKE 1 TABLET DAILY 90 tablet 3   • losartan (COZAAR) 50 mg tablet TAKE 1 TABLET DAILY 90 tablet 0   • methylPREDNISolone 4 MG tablet therapy pack Use as directed on package (Patient not taking: Reported on 3/29/2024) 21 each 0   • Misc. Devices (Wheelchair Cushion) MISC by Does not apply route daily Combo cushion 1 each 0   • nitrofurantoin (MACRODANTIN) 100 mg capsule Take 100 mg by mouth 4 (four) times a day     • nystatin (MYCOSTATIN) cream Apply topically 2 (two) times a day 30 g 3   • ondansetron (ZOFRAN-ODT) 4 mg disintegrating  tablet Take 1 tablet (4 mg total) by mouth every 8 (eight) hours as needed for nausea or vomiting for up to 20 doses 20 tablet 0   • oxybutynin (DITROPAN) 5 mg tablet Take 1 tablet (5 mg total) by mouth 3 (three) times a day 90 tablet 1     No current facility-administered medications for this visit.      Allergies:     No Known Allergies   Physical Exam:     /76   Pulse 84   Temp 98.4 °F (36.9 °C)   Resp 20   LMP  (LMP Unknown)     Physical Exam  Vitals and nursing note reviewed.   Constitutional:       General: She is not in acute distress.     Appearance: She is well-developed.   HENT:      Head: Normocephalic and atraumatic.      Right Ear: Tympanic membrane, ear canal and external ear normal.      Left Ear: Tympanic membrane, ear canal and external ear normal.      Nose: Nose normal.      Mouth/Throat:      Mouth: Mucous membranes are moist.      Pharynx: Oropharynx is clear.   Eyes:      Extraocular Movements: Extraocular movements intact.      Conjunctiva/sclera: Conjunctivae normal.      Pupils: Pupils are equal, round, and reactive to light.   Cardiovascular:      Rate and Rhythm: Normal rate and regular rhythm.      Heart sounds: No murmur heard.  Pulmonary:      Effort: Pulmonary effort is normal. No respiratory distress.      Breath sounds: Normal breath sounds.   Abdominal:      General: Abdomen is flat. Bowel sounds are normal.      Palpations: Abdomen is soft.      Tenderness: There is no abdominal tenderness.   Musculoskeletal:         General: No swelling.      Cervical back: Normal range of motion and neck supple.   Skin:     General: Skin is warm and dry.      Capillary Refill: Capillary refill takes less than 2 seconds.   Neurological:      Mental Status: She is alert. Mental status is at baseline.   Psychiatric:         Mood and Affect: Mood normal.         Behavior: Behavior normal.         Thought Content: Thought content normal.         Judgment: Judgment normal.          Edilson  DO Deep  [facility] PRIMARY [facility]

## 2024-03-29 NOTE — PROGRESS NOTES
ADULT ANNUAL PHYSICAL  [facility] PRIMARY CARE    NAME: Mariaelena Duarte  AGE: 68 y.o. 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    Dyslipidemia on atorvastatin 80 mg    Hypertensive cardiovascular disease with a blood pressure controlled on the current regimen    Depression improved with Lexapro 10 mg daily    Urge incontinence Ditropan 5 mg 3 times daily improves function    The patient is agreeable to mammography    Patient defers colon cancer screening and a DEXA scan     Problem List Items Addressed This Visit       Hypertension    H/O ischemic left MCA stroke    Spastic hemiplegia of right dominant side as late effect of cerebral infarction (HCC) (Chronic)    Hypertensive heart disease with congestive heart failure (HCC)     Other Visit Diagnoses       Annual physical exam    -  Primary    Depression as late effect of cerebrovascular accident (CVA)        Urgency incontinence        Encounter for screening mammogram for breast cancer        Relevant Orders    Mammo screening bilateral w 3d & cad              Immunizations and preventive care screenings were discussed with patient today. Appropriate education was printed on patient's after visit summary.    Counseling:  Alcohol/drug use: discussed moderation in alcohol intake, the recommendations for healthy alcohol use, and avoidance of illicit drug use.  Dental Health: discussed importance of regular tooth brushing, flossing, and dental visits.  Injury prevention: discussed safety/seat belts, safety helmets, smoke detectors, carbon dioxide detectors, and smoking near bedding or upholstery.         No follow-ups on file.     Chief Complaint:     Chief Complaint   Patient presents with    Annual Exam    Follow-up    Facial Swelling     Right sided facial swelling. Aide noticed today but aide has not been with patient  for the last 5 days.       History of Present Illness:     Adult Annual Physical   Patient here for a comprehensive physical exam. The patient reports no problems.    Diet and Physical Activity  Diet/Nutrition: well balanced diet.   Exercise: no formal exercise.      Depression Screening  PHQ-2/9 Depression Screening           General Health  Sleep: sleeps well and gets 7-8 hours of sleep on average.   Hearing: normal - bilateral.  Vision: wears glasses.   Dental: regular dental visits and brushes teeth twice daily.       /GYN Health  Follows with gynecology? yes   Patient is: postmenopausal  Last menstrual period: yrs  Contraceptive method: menopause.    Advanced Care Planning  Do you have an advanced directive? yes  Do you have a durable medical power of ? yes  ACP document given to the patient? no     Review of Systems:     Review of Systems   Constitutional: Negative.  Negative for chills and fever.   HENT: Negative.  Negative for ear pain and sore throat.    Eyes: Negative.  Negative for pain and visual disturbance.   Respiratory: Negative.  Negative for cough and shortness of breath.    Cardiovascular: Negative.  Negative for chest pain and palpitations.   Gastrointestinal: Negative.  Negative for abdominal pain and vomiting.   Endocrine: Negative.    Genitourinary: Negative.  Negative for dysuria and hematuria.   Musculoskeletal: Negative.  Negative for arthralgias and back pain.   Skin: Negative.  Negative for color change and rash.   Allergic/Immunologic: Negative.    Neurological: Negative.  Negative for seizures and syncope.   Hematological: Negative.    Psychiatric/Behavioral: Negative.     All other systems reviewed and are negative.     Past Medical History:     Past Medical History:   Diagnosis Date    CVA (cerebral vascular accident) (HCC)     CVA, old, aphasia     Depression     Hypertension     Hypertensive heart disease with congestive heart failure (HCC) 6/12/2020    Medial meniscus  tear     left    Mitral valve prolapse     Spastic hemiplegia of right dominant side as late effect of cerebral infarction (HCC) 2019    Stroke (HCC)       Past Surgical History:     Past Surgical History:   Procedure Laterality Date     SECTION      FRACTURE SURGERY      HYSTERECTOMY      AR OPTX FEM SHFT FX W/INSJ IMED IMPLT W/WO SCREW Left 2017    Procedure: INSERTION NAIL IM FEMUR ANTEGRADE (TROCHANTERIC);  Surgeon: Debbi Clark MD;  [facility]: [facility];  Service: Orthopedics    TONSILLECTOMY        Social History:     Social History     Socioeconomic History    Marital status:      Spouse name: None    Number of children: None    Years of education: None    Highest education level: None   Occupational History    None   Tobacco Use    Smoking status: Never    Smokeless tobacco: Never   Vaping Use    Vaping status: Never Used   Substance and Sexual Activity    Alcohol use: Not Currently    Drug use: No    Sexual activity: Not Currently   Other Topics Concern    None   Social History Narrative    None     Social Determinants of Health     Financial Resource Strain: Not on file   Food Insecurity: Not on file   Transportation Needs: Not on file   Physical Activity: Not on file   Stress: Not on file   Social Connections: Not on file   Intimate Partner Violence: Not on file   Housing Stability: Not on file      Family History:     Family History   Problem Relation Age of Onset    Arthritis Mother     Heart disease Father     Heart attack Father       Current Medications:     Current Outpatient Medications   Medication Sig Dispense Refill    Ascorbic Acid (VITAMIN C PO) Take by mouth      aspirin 325 mg tablet Take 325 mg by mouth daily      atorvastatin (LIPITOR) 80 mg tablet TAKE 1 TABLET DAILY 90 tablet 3    benzonatate (TESSALON) 200 MG capsule Take 1 capsule (200 mg total) by mouth 3 (three) times a day as needed for cough (Patient not taking: Reported on 3/29/2024) 20 capsule 0     CALCIUM PO Take by mouth      cholecalciferol (VITAMIN D3) 1,000 units tablet Take 2,000 Units by mouth daily       escitalopram (LEXAPRO) 10 mg tablet TAKE 1 TABLET DAILY 90 tablet 3    HYDROcodone-acetaminophen (Norco) 5-325 mg per tablet Take 1 tablet by mouth 2 (two) times a day as needed for pain Max Daily Amount: 2 tablets 10 tablet 0    ibuprofen (MOTRIN) 600 mg tablet TAKE 1 TABLET EVERY 8 HOURS AS NEEDED FOR MILD PAIN OR MODERATE PAIN FOR UP TO 5 DAYS 270 tablet 3    Incontinence Supplies MISC Use in the morning 12 each 11    Incontinence Supply Disposable (A + D Personal Care Wipes) MISC Use daily 2 each 11    Incontinence Supply Disposable (Disposable Brief X-Large) MISC Use 6 (six) times a day 300 each 11    Incontinence Supply Disposable MISC Use daily DISPOSABLE BED PADS 60 each 11    Klor-Con M20 20 MEQ tablet TAKE 1 TABLET DAILY 90 tablet 3    losartan (COZAAR) 50 mg tablet TAKE 1 TABLET DAILY 90 tablet 0    methylPREDNISolone 4 MG tablet therapy pack Use as directed on package (Patient not taking: Reported on 3/29/2024) 21 each 0    Misc. Devices (Wheelchair Cushion) MISC by Does not apply route daily Combo cushion 1 each 0    nitrofurantoin (MACRODANTIN) 100 mg capsule Take 100 mg by mouth 4 (four) times a day      nystatin (MYCOSTATIN) cream Apply topically 2 (two) times a day 30 g 3    ondansetron (ZOFRAN-ODT) 4 mg disintegrating tablet Take 1 tablet (4 mg total) by mouth every 8 (eight) hours as needed for nausea or vomiting for up to 20 doses 20 tablet 0    oxybutynin (DITROPAN) 5 mg tablet Take 1 tablet (5 mg total) by mouth 3 (three) times a day 90 tablet 1     No current facility-administered medications for this visit.      Allergies:     No Known Allergies   Physical Exam:     /76   Pulse 84   Temp 98.4 °F (36.9 °C)   Resp 20   LMP  (LMP Unknown)     Physical Exam  Neurological:      Comments: Right-sided spastic paralysis and aphasia          Edilson Adame, DO  [facility]  [facility]

## 2024-04-16 ENCOUNTER — APPOINTMENT (OUTPATIENT)
Dept: LAB | Facility: HOSPITAL | Age: 69
End: 2024-04-16
Payer: COMMERCIAL

## 2024-04-16 DIAGNOSIS — Z13.29 THYROID DISORDER SCREEN: ICD-10-CM

## 2024-04-16 DIAGNOSIS — E78.2 MIXED HYPERLIPIDEMIA: ICD-10-CM

## 2024-04-16 DIAGNOSIS — E55.9 VITAMIN D DEFICIENCY: ICD-10-CM

## 2024-04-16 DIAGNOSIS — E87.6 HYPOKALEMIA: ICD-10-CM

## 2024-04-16 DIAGNOSIS — Z00.00 ANNUAL PHYSICAL EXAM: ICD-10-CM

## 2024-04-16 DIAGNOSIS — I10 HYPERTENSION, UNSPECIFIED TYPE: ICD-10-CM

## 2024-04-16 DIAGNOSIS — E61.1 IRON DEFICIENCY: ICD-10-CM

## 2024-04-16 DIAGNOSIS — Z86.73 H/O ISCHEMIC LEFT MCA STROKE: ICD-10-CM

## 2024-04-16 LAB
25(OH)D3 SERPL-MCNC: 48.9 NG/ML (ref 30–100)
ALBUMIN SERPL BCG-MCNC: 4.1 G/DL (ref 3.5–5)
ALP SERPL-CCNC: 129 U/L (ref 34–104)
ALT SERPL W P-5'-P-CCNC: 18 U/L (ref 7–52)
ANION GAP SERPL CALCULATED.3IONS-SCNC: 6 MMOL/L (ref 4–13)
AST SERPL W P-5'-P-CCNC: 17 U/L (ref 13–39)
BASOPHILS # BLD AUTO: 0.03 THOUSANDS/ÂΜL (ref 0–0.1)
BASOPHILS NFR BLD AUTO: 0 % (ref 0–1)
BILIRUB SERPL-MCNC: 0.54 MG/DL (ref 0.2–1)
BUN SERPL-MCNC: 19 MG/DL (ref 5–25)
CALCIUM SERPL-MCNC: 9.3 MG/DL (ref 8.4–10.2)
CHLORIDE SERPL-SCNC: 105 MMOL/L (ref 96–108)
CHOLEST SERPL-MCNC: 123 MG/DL
CO2 SERPL-SCNC: 29 MMOL/L (ref 21–32)
CREAT SERPL-MCNC: 0.75 MG/DL (ref 0.6–1.3)
EOSINOPHIL # BLD AUTO: 0.11 THOUSAND/ÂΜL (ref 0–0.61)
EOSINOPHIL NFR BLD AUTO: 2 % (ref 0–6)
ERYTHROCYTE [DISTWIDTH] IN BLOOD BY AUTOMATED COUNT: 13.7 % (ref 11.6–15.1)
GFR SERPL CREATININE-BSD FRML MDRD: 82 ML/MIN/1.73SQ M
GLUCOSE P FAST SERPL-MCNC: 79 MG/DL (ref 65–99)
HCT VFR BLD AUTO: 44.6 % (ref 34.8–46.1)
HDLC SERPL-MCNC: 51 MG/DL
HGB BLD-MCNC: 13.8 G/DL (ref 11.5–15.4)
IMM GRANULOCYTES # BLD AUTO: 0.02 THOUSAND/UL (ref 0–0.2)
IMM GRANULOCYTES NFR BLD AUTO: 0 % (ref 0–2)
LDLC SERPL CALC-MCNC: 46 MG/DL (ref 0–100)
LYMPHOCYTES # BLD AUTO: 3.06 THOUSANDS/ÂΜL (ref 0.6–4.47)
LYMPHOCYTES NFR BLD AUTO: 41 % (ref 14–44)
MCH RBC QN AUTO: 30.2 PG (ref 26.8–34.3)
MCHC RBC AUTO-ENTMCNC: 30.9 G/DL (ref 31.4–37.4)
MCV RBC AUTO: 98 FL (ref 82–98)
MONOCYTES # BLD AUTO: 0.53 THOUSAND/ÂΜL (ref 0.17–1.22)
MONOCYTES NFR BLD AUTO: 7 % (ref 4–12)
NEUTROPHILS # BLD AUTO: 3.81 THOUSANDS/ÂΜL (ref 1.85–7.62)
NEUTS SEG NFR BLD AUTO: 50 % (ref 43–75)
NRBC BLD AUTO-RTO: 0 /100 WBCS
PLATELET # BLD AUTO: 377 THOUSANDS/UL (ref 149–390)
PMV BLD AUTO: 10.2 FL (ref 8.9–12.7)
POTASSIUM SERPL-SCNC: 4.6 MMOL/L (ref 3.5–5.3)
PROT SERPL-MCNC: 7.1 G/DL (ref 6.4–8.4)
RBC # BLD AUTO: 4.57 MILLION/UL (ref 3.81–5.12)
SODIUM SERPL-SCNC: 140 MMOL/L (ref 135–147)
TRIGL SERPL-MCNC: 130 MG/DL
TSH SERPL DL<=0.05 MIU/L-ACNC: 3.4 UIU/ML (ref 0.45–4.5)
WBC # BLD AUTO: 7.56 THOUSAND/UL (ref 4.31–10.16)

## 2024-04-16 PROCEDURE — 82306 VITAMIN D 25 HYDROXY: CPT

## 2024-04-16 PROCEDURE — 36415 COLL VENOUS BLD VENIPUNCTURE: CPT

## 2024-04-16 PROCEDURE — 80061 LIPID PANEL: CPT

## 2024-04-16 PROCEDURE — 84443 ASSAY THYROID STIM HORMONE: CPT

## 2024-04-16 PROCEDURE — 85025 COMPLETE CBC W/AUTO DIFF WBC: CPT

## 2024-04-16 PROCEDURE — 80053 COMPREHEN METABOLIC PANEL: CPT

## 2024-04-16 RX ORDER — LOSARTAN POTASSIUM 50 MG/1
50 TABLET ORAL DAILY
Qty: 90 TABLET | Refills: 1 | Status: SHIPPED | OUTPATIENT
Start: 2024-04-16

## 2024-05-10 ENCOUNTER — TELEPHONE (OUTPATIENT)
Age: 69
End: 2024-05-10

## 2024-05-10 NOTE — TELEPHONE ENCOUNTER
Patients nirmala called in looking for suggestions on how to go about getting patient a lift chair and motor wheelchair. Nirmala would like a call. Please advise.

## 2024-05-16 NOTE — TELEPHONE ENCOUNTER
Spoke to Fe - advised that she would need to see physiatry to get a motorized scooter - niece understood and will hold off on that - states she had one before and did not use it -     She states she does need a new wheelchair - her brakes do not work   I told her we will order wheel chair and try the lift chair     Sent order for wheelchair for PCP approval     I will need to call [facility] regarding how to order a lift chair

## 2024-05-17 LAB

## 2024-06-20 LAB

## 2024-07-02 PROBLEM — M19.90 ARTHRITIS: Status: ACTIVE | Noted: 2024-07-02

## 2024-07-09 ENCOUNTER — OFFICE VISIT (OUTPATIENT)
Dept: URGENT CARE | Facility: MEDICAL CENTER | Age: 69
End: 2024-07-09
Payer: COMMERCIAL

## 2024-07-09 VITALS
HEART RATE: 54 BPM | RESPIRATION RATE: 18 BRPM | OXYGEN SATURATION: 96 % | TEMPERATURE: 98 F | SYSTOLIC BLOOD PRESSURE: 110 MMHG | DIASTOLIC BLOOD PRESSURE: 70 MMHG

## 2024-07-09 DIAGNOSIS — R21 RASH: Primary | ICD-10-CM

## 2024-07-09 DIAGNOSIS — H11.32 SUBCONJUNCTIVAL HEMORRHAGE OF LEFT EYE: ICD-10-CM

## 2024-07-09 PROCEDURE — S9083 URGENT CARE CENTER GLOBAL: HCPCS | Performed by: PHYSICIAN ASSISTANT

## 2024-07-09 PROCEDURE — G0382 LEV 3 HOSP TYPE B ED VISIT: HCPCS | Performed by: PHYSICIAN ASSISTANT

## 2024-07-09 NOTE — PROGRESS NOTES
[facility]        NAME: Mariaelena Duarte is a 68 y.o. female  : 1955    MRN: [id number]  DATE: 2024  TIME: 2:34 PM    Assessment and Plan   Rash [R21]  1. Rash  silver sulfadiazine (SILVADENE,SSD) 1 % cream    DISCONTINUED: silver sulfadiazine (SILVADENE,SSD) 1 % cream      2. Subconjunctival hemorrhage of left eye              Patient Instructions     Start silvadene for rash on buttocks  If facial swelling worsens have patient rechecked    Follow up with PCP in 3-5 days.  Proceed to  ER if symptoms worsen.    If tests have been performed at [facility], our office will contact you with results if changes need to be made to the care plan discussed with you at the visit.  You can review your full results on [facility].    Chief Complaint     Chief Complaint   Patient presents with   • Eye Problem         History of Present Illness       Patient presents with her care taker who states patient woke up with redness to her right eye and swelling of her right cheek.  Patient has aphasia secondary to a stroke.  She was able able to answer no to several questions.  Patient is also having a rash between her buttocks for the past several days.  Patient denies dental pain or fever.        Review of Systems   Review of Systems   Constitutional:  Negative for chills and fever.   HENT:  Negative for dental problem and sore throat.    Eyes:  Positive for redness. Negative for pain, discharge and visual disturbance.   Respiratory:  Negative for cough.    Skin:  Positive for rash.   Hematological:  Negative for adenopathy.         Current Medications       Current Outpatient Medications:   •  Ascorbic Acid (VITAMIN C PO), Take by mouth, Disp: , Rfl:   •  aspirin 325 mg tablet, Take 325 mg by mouth daily, Disp: , Rfl:   •  atorvastatin (LIPITOR) 80 mg tablet, TAKE 1 TABLET DAILY, Disp: 90 tablet, Rfl: 3  •  CALCIUM PO, Take by mouth, Disp: , Rfl:   •  cholecalciferol (VITAMIN D3) 1,000 units tablet, Take  2,000 Units by mouth daily , Disp: , Rfl:   •  escitalopram (LEXAPRO) 10 mg tablet, TAKE 1 TABLET DAILY, Disp: 90 tablet, Rfl: 3  •  HYDROcodone-acetaminophen (Norco) 5-325 mg per tablet, Take 1 tablet by mouth 2 (two) times a day as needed for pain Max Daily Amount: 2 tablets, Disp: 10 tablet, Rfl: 0  •  Klor-Con M20 20 MEQ tablet, TAKE 1 TABLET DAILY, Disp: 90 tablet, Rfl: 3  •  oxybutynin (DITROPAN) 5 mg tablet, Take 1 tablet (5 mg total) by mouth 3 (three) times a day, Disp: 90 tablet, Rfl: 1  •  silver sulfadiazine (SILVADENE,SSD) 1 % cream, Apply topically 2 (two) times a day, Disp: 50 g, Rfl: 1  •  benzonatate (TESSALON) 200 MG capsule, Take 1 capsule (200 mg total) by mouth 3 (three) times a day as needed for cough (Patient not taking: Reported on 3/29/2024), Disp: 20 capsule, Rfl: 0  •  ibuprofen (MOTRIN) 600 mg tablet, TAKE 1 TABLET EVERY 8 HOURS AS NEEDED FOR MILD PAIN OR MODERATE PAIN FOR UP TO 5 DAYS, Disp: 270 tablet, Rfl: 3  •  Incontinence Supplies MISC, Use in the morning, Disp: 12 each, Rfl: 11  •  Incontinence Supply Disposable (A + D Personal Care Wipes) MISC, Use daily, Disp: 2 each, Rfl: 11  •  Incontinence Supply Disposable (Disposable Brief X-Large) MISC, Use 6 (six) times a day, Disp: 300 each, Rfl: 11  •  Incontinence Supply Disposable MISC, Use daily DISPOSABLE BED PADS, Disp: 60 each, Rfl: 11  •  losartan (COZAAR) 50 mg tablet, TAKE 1 TABLET DAILY, Disp: 90 tablet, Rfl: 1  •  methylPREDNISolone 4 MG tablet therapy pack, Use as directed on package (Patient not taking: Reported on 3/29/2024), Disp: 21 each, Rfl: 0  •  Misc. Devices (Wheelchair Cushion) MISC, by Does not apply route daily Combo cushion, Disp: 1 each, Rfl: 0  •  nitrofurantoin (MACRODANTIN) 100 mg capsule, Take 100 mg by mouth 4 (four) times a day, Disp: , Rfl:   •  nystatin (MYCOSTATIN) cream, Apply topically 2 (two) times a day, Disp: 30 g, Rfl: 3  •  ondansetron (ZOFRAN-ODT) 4 mg disintegrating tablet, Take 1 tablet (4 mg  total) by mouth every 8 (eight) hours as needed for nausea or vomiting for up to 20 doses, Disp: 20 tablet, Rfl: 0    Current Allergies     Allergies as of 2024   • (No Known Allergies)            The following portions of the patient's history were reviewed and updated as appropriate: allergies, current medications, past family history, past medical history, past social history, past surgical history and problem list.     Past Medical History:   Diagnosis Date   • Arthritis 2024   • CVA (cerebral vascular accident) ([facility])    • CVA, old, aphasia    • Depression    • Hypertension    • Hypertensive heart disease with congestive heart failure ([facility]) 2020   • Medial meniscus tear     left   • Mitral valve prolapse    • Spastic hemiplegia of right dominant side as late effect of cerebral infarction ([facility]) 2019   • Stroke ([facility])        Past Surgical History:   Procedure Laterality Date   •  SECTION     • FRACTURE SURGERY     • HYSTERECTOMY     • CO OPTX FEM SHFT FX W/INSJ IMED IMPLT W/WO SCREW Left 2017    Procedure: INSERTION NAIL IM FEMUR ANTEGRADE (TROCHANTERIC);  Surgeon: Debbi Clark MD;  Location: [facility];  Service: Orthopedics   • TONSILLECTOMY         Family History   Problem Relation Age of Onset   • Arthritis Mother    • Heart disease Father    • Heart attack Father          Medications have been verified.        Objective   /70   Pulse (!) 54   Temp 98 °F (36.7 °C)   Resp 18   LMP  (LMP Unknown)   SpO2 96%   No LMP recorded (lmp unknown). Patient has had a hysterectomy.       Physical Exam     Physical Exam  Vitals and nursing note reviewed.   Constitutional:       Appearance: Normal appearance.   HENT:      Head: Normocephalic.      Comments: Minute amount of right maxillary swelling when compared with left.  No pain with palpation.  No obvious dental abscess, no swelling of the gingiva     Mouth/Throat:      Mouth: Mucous membranes are moist.   Eyes:       Pupils: Pupils are equal, round, and reactive to light.      Comments: Small subconjunctival hemorrhage right eye which appears to be resolving   Cardiovascular:      Rate and Rhythm: Normal rate.   Pulmonary:      Effort: Pulmonary effort is normal.   Skin:     General: Skin is warm.      Comments: Intergluteal rash right buttock with mild erythema rash appears to be nonspecific.  No blistering   Neurological:      Mental Status: She is alert.

## 2024-08-26 DIAGNOSIS — I11.0 HYPERTENSIVE HEART DISEASE WITH CONGESTIVE HEART FAILURE, UNSPECIFIED HEART FAILURE TYPE (HCC): ICD-10-CM

## 2024-08-27 ENCOUNTER — OFFICE VISIT (OUTPATIENT)
Dept: FAMILY MEDICINE CLINIC | Facility: CLINIC | Age: 69
End: 2024-08-27
Payer: COMMERCIAL

## 2024-08-27 VITALS
TEMPERATURE: 98.4 F | HEART RATE: 84 BPM | RESPIRATION RATE: 20 BRPM | SYSTOLIC BLOOD PRESSURE: 128 MMHG | DIASTOLIC BLOOD PRESSURE: 84 MMHG

## 2024-08-27 DIAGNOSIS — N39.41 URGENCY INCONTINENCE: ICD-10-CM

## 2024-08-27 DIAGNOSIS — I10 PRIMARY HYPERTENSION: ICD-10-CM

## 2024-08-27 DIAGNOSIS — Z86.73 H/O ISCHEMIC LEFT MCA STROKE: ICD-10-CM

## 2024-08-27 DIAGNOSIS — I69.351 SPASTIC HEMIPLEGIA OF RIGHT DOMINANT SIDE AS LATE EFFECT OF CEREBRAL INFARCTION (HCC): ICD-10-CM

## 2024-08-27 DIAGNOSIS — E78.2 MIXED HYPERLIPIDEMIA: ICD-10-CM

## 2024-08-27 DIAGNOSIS — K12.0 APHTHOUS ULCER: Primary | ICD-10-CM

## 2024-08-27 DIAGNOSIS — F06.31 DEPRESSION AS LATE EFFECT OF CEREBROVASCULAR ACCIDENT (CVA): ICD-10-CM

## 2024-08-27 DIAGNOSIS — E55.9 VITAMIN D DEFICIENCY: ICD-10-CM

## 2024-08-27 DIAGNOSIS — I69.398 DEPRESSION AS LATE EFFECT OF CEREBROVASCULAR ACCIDENT (CVA): ICD-10-CM

## 2024-08-27 PROCEDURE — 99214 OFFICE O/P EST MOD 30 MIN: CPT | Performed by: FAMILY MEDICINE

## 2024-08-27 RX ORDER — TRIAMCINOLONE ACETONIDE 1 MG/G
CREAM TOPICAL 2 TIMES DAILY
Qty: 45 G | Refills: 1 | Status: SHIPPED | OUTPATIENT
Start: 2024-08-27 | End: 2024-08-29 | Stop reason: SDUPTHER

## 2024-08-27 RX ORDER — ASPIRIN 81 MG/1
81 TABLET, CHEWABLE ORAL DAILY
COMMUNITY

## 2024-08-27 RX ORDER — POTASSIUM CHLORIDE 1500 MG/1
20 TABLET, EXTENDED RELEASE ORAL DAILY
Qty: 90 TABLET | Refills: 1 | Status: SHIPPED | OUTPATIENT
Start: 2024-08-27

## 2024-08-27 NOTE — PROGRESS NOTES
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    Dyslipidemia on atorvastatin 80 mg laboratory has been ordered    Hypertensive cardiovascular disease with a blood pressure controlled on the current regimen    Depression improved Lexapro 10 mg    Urge incontinence Ditropan 5 mg 3 times daily improved function    Problem List Items Addressed This Visit       Hypertension    H/O ischemic left MCA stroke    Relevant Orders    CBC and differential    Comprehensive metabolic panel    Spastic hemiplegia of right dominant side as late effect of cerebral infarction (HCC) (Chronic)     Other Visit Diagnoses       Aphthous ulcer    -  Primary    Relevant Medications    triamcinolone (KENALOG) 0.1 % cream    Depression as late effect of cerebrovascular accident (CVA)        Urgency incontinence        Vitamin D deficiency        Relevant Orders    Vitamin D 25 hydroxy    Mixed hyperlipidemia        Relevant Orders    Lipid Panel with Direct LDL reflex               Diagnoses and all orders for this visit:    Aphthous ulcer  -     triamcinolone (KENALOG) 0.1 % cream; Apply topically 2 (two) times a day    H/O ischemic left MCA stroke  -     CBC and differential; Future  -     Comprehensive metabolic panel; Future    Spastic hemiplegia of right dominant side as late effect of cerebral infarction (HCC)    Primary hypertension    Depression as late effect of cerebrovascular accident (CVA)    Urgency incontinence    Vitamin D deficiency  -     Vitamin D 25 hydroxy; Future    Mixed hyperlipidemia  -     Lipid Panel with Direct LDL reflex; Future    Other orders  -     aspirin 81 mg chewable tablet; Chew 81 mg daily        No problem-specific Assessment & Plan notes found for this encounter.      PHQ-2/9 Depression Screening    Little interest or pleasure in doing things: 0 - not at all  Feeling down, depressed, or hopeless: 0 -  not at all  PHQ-2 Score: 0  PHQ-2 Interpretation: Negative depression screen          There is no height or weight on file to calculate BMI.    BMI Counseling: There is no height or weight on file to calculate BMI. The BMI     Subjective:      Patient ID: Mariaelena Duarte is a 68 y.o. female.    Patient's aide states that she picks at her tongue every morning        The following portions of the patient's history were reviewed and updated as appropriate:   She has a past medical history of Arthritis (2024), CVA (cerebral vascular accident) ([facility]), CVA, old, aphasia, Depression, Hypertension, Hypertensive heart disease with congestive heart failure ([facility]) (2020), Medial meniscus tear, Mitral valve prolapse, Spastic hemiplegia of right dominant side as late effect of cerebral infarction ([facility]) (2019), and Stroke ([facility]).,  does not have any pertinent problems on file.,   has a past surgical history that includes Hysterectomy;  section; Tonsillectomy; Fracture surgery; and pr optx fem shft fx w/insj imed implt w/wo screw (Left, 2017).,  family history includes Arthritis in her mother; Heart attack in her father; Heart disease in her father.,   reports that she has never smoked. She has never used smokeless tobacco. She reports that she does not currently use alcohol. She reports that she does not use drugs.,  is allergic to medical tape..  Current Outpatient Medications   Medication Sig Dispense Refill    Ascorbic Acid (VITAMIN C PO) Take by mouth      aspirin 81 mg chewable tablet Chew 81 mg daily      atorvastatin (LIPITOR) 80 mg tablet TAKE 1 TABLET DAILY 90 tablet 3    escitalopram (LEXAPRO) 10 mg tablet TAKE 1 TABLET DAILY 90 tablet 3    ibuprofen (MOTRIN) 600 mg tablet TAKE 1 TABLET EVERY 8 HOURS AS NEEDED FOR MILD PAIN OR MODERATE PAIN FOR UP TO 5 DAYS (Patient taking differently: Take 600 mg by mouth if needed for moderate pain) 270 tablet 3    losartan (COZAAR) 50 mg tablet TAKE 1 TABLET  DAILY 90 tablet 1    potassium chloride (Klor-Con M20) 20 mEq tablet TAKE 1 TABLET DAILY 90 tablet 1    triamcinolone (KENALOG) 0.1 % cream Apply topically 2 (two) times a day 45 g 1    Incontinence Supplies MISC Use in the morning 12 each 11    Incontinence Supply Disposable (A + D Personal Care Wipes) MISC Use daily 2 each 11    Incontinence Supply Disposable (Disposable Brief X-Large) MISC Use 6 (six) times a day 300 each 11    Incontinence Supply Disposable MISC Use daily DISPOSABLE BED PADS 60 each 11    Misc. Devices (Wheelchair Cushion) MISC by Does not apply route daily Combo cushion 1 each 0    nystatin (MYCOSTATIN) cream Apply topically 2 (two) times a day (Patient not taking: Reported on 8/27/2024) 30 g 3    oxybutynin (DITROPAN) 5 mg tablet Take 1 tablet (5 mg total) by mouth 3 (three) times a day 90 tablet 1    silver sulfadiazine (SILVADENE,SSD) 1 % cream Apply topically 2 (two) times a day 50 g 1     No current facility-administered medications for this visit.       Review of Systems   Constitutional:  Negative for chills and fever.   HENT:  Negative for ear pain and sore throat.         Aphthous ulcer of tongue   Eyes:  Negative for pain and visual disturbance.   Respiratory:  Negative for cough and shortness of breath.    Cardiovascular:  Negative for chest pain and palpitations.   Gastrointestinal:  Negative for abdominal pain and vomiting.   Genitourinary:  Negative for dysuria and hematuria.   Musculoskeletal:  Negative for arthralgias and back pain.   Skin:  Negative for color change and rash.   Neurological:  Negative for seizures and syncope.        Aphasic post CVA   All other systems reviewed and are negative.        Objective:    /84   Pulse 84   Temp 98.4 °F (36.9 °C)   Resp 20   LMP  (LMP Unknown)   There is no height or weight on file to calculate BMI.     Physical Exam  Constitutional:       Appearance: Normal appearance. She is well-developed.   HENT:      Head: Normocephalic  and atraumatic.      Right Ear: Tympanic membrane, ear canal and external ear normal.      Left Ear: Tympanic membrane, ear canal and external ear normal.      Nose: Nose normal.      Mouth/Throat:      Mouth: Mucous membranes are moist.      Pharynx: Oropharynx is clear.   Eyes:      Extraocular Movements: Extraocular movements intact.      Conjunctiva/sclera: Conjunctivae normal.      Pupils: Pupils are equal, round, and reactive to light.   Cardiovascular:      Rate and Rhythm: Normal rate and regular rhythm.      Pulses: Normal pulses.      Heart sounds: Normal heart sounds.   Pulmonary:      Effort: Pulmonary effort is normal.      Breath sounds: Normal breath sounds.   Abdominal:      General: Abdomen is flat. Bowel sounds are normal.      Palpations: Abdomen is soft.      Tenderness: There is no abdominal tenderness.   Musculoskeletal:         General: Normal range of motion.      Cervical back: Normal range of motion and neck supple.   Skin:     General: Skin is warm and dry.      Capillary Refill: Capillary refill takes less than 2 seconds.   Neurological:      Mental Status: She is alert and oriented to person, place, and time. Mental status is at baseline.      Sensory: Sensory deficit present.      Coordination: Coordination abnormal.      Gait: Gait abnormal.      Deep Tendon Reflexes: Reflexes abnormal.      Comments: Deep tendon reflexes absent on the right side right-sided hemideficit aphasic   Psychiatric:         Mood and Affect: Mood normal.         Behavior: Behavior normal.         Thought Content: Thought content normal.         Judgment: Judgment normal.

## 2024-08-29 DIAGNOSIS — K12.0 APHTHOUS ULCER: ICD-10-CM

## 2024-08-29 RX ORDER — DIPHENHYDRAMINE HYDROCHLORIDE AND LIDOCAINE HYDROCHLORIDE AND ALUMINUM HYDROXIDE AND MAGNESIUM HYDRO
10 KIT EVERY 4 HOURS PRN
Qty: 119 ML | Refills: 0 | Status: SHIPPED | OUTPATIENT
Start: 2024-08-29

## 2024-08-29 RX ORDER — TRIAMCINOLONE ACETONIDE 1 MG/G
CREAM TOPICAL 2 TIMES DAILY
Qty: 45 G | Refills: 1 | Status: SHIPPED | OUTPATIENT
Start: 2024-08-29

## 2024-08-29 RX ORDER — DIPHENHYDRAMINE HYDROCHLORIDE AND LIDOCAINE HYDROCHLORIDE AND ALUMINUM HYDROXIDE AND MAGNESIUM HYDRO
10 KIT EVERY 4 HOURS PRN
Qty: 119 ML | Refills: 0 | Status: SHIPPED | OUTPATIENT
Start: 2024-08-29 | End: 2024-08-29 | Stop reason: SDUPTHER

## 2024-08-29 NOTE — TELEPHONE ENCOUNTER
Triamcinolone cream sent to mailorder. Pt's caregiver requesting to have this sent to [facility] pharmacy.

## 2024-08-29 NOTE — TELEPHONE ENCOUNTER
Caregiver called back stating the prescription triamcinolone (Kenalog) 0.1 % cream is the wrong thing.  Pt was told she would have something prescribed to apply to her tongue and pharmacy is saying this medication cannot be used for that.    Please advise

## 2024-09-09 ENCOUNTER — TELEPHONE (OUTPATIENT)
Dept: LAB | Facility: HOSPITAL | Age: 69
End: 2024-09-09

## 2024-09-24 ENCOUNTER — APPOINTMENT (OUTPATIENT)
Dept: LAB | Facility: HOSPITAL | Age: 69
End: 2024-09-24
Payer: COMMERCIAL

## 2024-09-24 DIAGNOSIS — E55.9 VITAMIN D DEFICIENCY: ICD-10-CM

## 2024-09-24 DIAGNOSIS — Z86.73 H/O ISCHEMIC LEFT MCA STROKE: ICD-10-CM

## 2024-09-24 DIAGNOSIS — E78.2 MIXED HYPERLIPIDEMIA: ICD-10-CM

## 2024-09-24 LAB
25(OH)D3 SERPL-MCNC: 67.9 NG/ML (ref 30–100)
ALBUMIN SERPL BCG-MCNC: 4.3 G/DL (ref 3.5–5)
ALP SERPL-CCNC: 143 U/L (ref 34–104)
ALT SERPL W P-5'-P-CCNC: 17 U/L (ref 7–52)
ANION GAP SERPL CALCULATED.3IONS-SCNC: 9 MMOL/L (ref 4–13)
AST SERPL W P-5'-P-CCNC: 18 U/L (ref 13–39)
BASOPHILS # BLD AUTO: 0.04 THOUSANDS/ΜL (ref 0–0.1)
BASOPHILS NFR BLD AUTO: 1 % (ref 0–1)
BILIRUB SERPL-MCNC: 0.66 MG/DL (ref 0.2–1)
BUN SERPL-MCNC: 14 MG/DL (ref 5–25)
CALCIUM SERPL-MCNC: 9.5 MG/DL (ref 8.4–10.2)
CHLORIDE SERPL-SCNC: 105 MMOL/L (ref 96–108)
CHOLEST SERPL-MCNC: 114 MG/DL
CO2 SERPL-SCNC: 29 MMOL/L (ref 21–32)
CREAT SERPL-MCNC: 0.72 MG/DL (ref 0.6–1.3)
EOSINOPHIL # BLD AUTO: 0.08 THOUSAND/ΜL (ref 0–0.61)
EOSINOPHIL NFR BLD AUTO: 1 % (ref 0–6)
ERYTHROCYTE [DISTWIDTH] IN BLOOD BY AUTOMATED COUNT: 14.1 % (ref 11.6–15.1)
GFR SERPL CREATININE-BSD FRML MDRD: 86 ML/MIN/1.73SQ M
GLUCOSE P FAST SERPL-MCNC: 86 MG/DL (ref 65–99)
HCT VFR BLD AUTO: 45.3 % (ref 34.8–46.1)
HDLC SERPL-MCNC: 45 MG/DL
HGB BLD-MCNC: 14.1 G/DL (ref 11.5–15.4)
IMM GRANULOCYTES # BLD AUTO: 0.01 THOUSAND/UL (ref 0–0.2)
IMM GRANULOCYTES NFR BLD AUTO: 0 % (ref 0–2)
LDLC SERPL CALC-MCNC: 46 MG/DL (ref 0–100)
LYMPHOCYTES # BLD AUTO: 2.91 THOUSANDS/ΜL (ref 0.6–4.47)
LYMPHOCYTES NFR BLD AUTO: 34 % (ref 14–44)
MCH RBC QN AUTO: 30.2 PG (ref 26.8–34.3)
MCHC RBC AUTO-ENTMCNC: 31.1 G/DL (ref 31.4–37.4)
MCV RBC AUTO: 97 FL (ref 82–98)
MONOCYTES # BLD AUTO: 0.53 THOUSAND/ΜL (ref 0.17–1.22)
MONOCYTES NFR BLD AUTO: 6 % (ref 4–12)
NEUTROPHILS # BLD AUTO: 5.03 THOUSANDS/ΜL (ref 1.85–7.62)
NEUTS SEG NFR BLD AUTO: 58 % (ref 43–75)
NRBC BLD AUTO-RTO: 0 /100 WBCS
PLATELET # BLD AUTO: 349 THOUSANDS/UL (ref 149–390)
PMV BLD AUTO: 10.7 FL (ref 8.9–12.7)
POTASSIUM SERPL-SCNC: 4.7 MMOL/L (ref 3.5–5.3)
PROT SERPL-MCNC: 7.4 G/DL (ref 6.4–8.4)
RBC # BLD AUTO: 4.67 MILLION/UL (ref 3.81–5.12)
SODIUM SERPL-SCNC: 143 MMOL/L (ref 135–147)
TRIGL SERPL-MCNC: 114 MG/DL
WBC # BLD AUTO: 8.6 THOUSAND/UL (ref 4.31–10.16)

## 2024-09-24 PROCEDURE — 82306 VITAMIN D 25 HYDROXY: CPT

## 2024-09-24 PROCEDURE — 80053 COMPREHEN METABOLIC PANEL: CPT

## 2024-09-24 PROCEDURE — 36415 COLL VENOUS BLD VENIPUNCTURE: CPT

## 2024-09-24 PROCEDURE — 85025 COMPLETE CBC W/AUTO DIFF WBC: CPT

## 2024-09-24 PROCEDURE — 80061 LIPID PANEL: CPT

## 2024-10-14 DIAGNOSIS — Z86.73 H/O ISCHEMIC LEFT MCA STROKE: ICD-10-CM

## 2024-10-15 RX ORDER — LOSARTAN POTASSIUM 50 MG/1
50 TABLET ORAL DAILY
Qty: 90 TABLET | Refills: 1 | Status: SHIPPED | OUTPATIENT
Start: 2024-10-15

## 2024-10-18 ENCOUNTER — TELEPHONE (OUTPATIENT)
Age: 69
End: 2024-10-18

## 2024-10-18 DIAGNOSIS — R21 RASH: ICD-10-CM

## 2024-10-18 RX ORDER — SILVER SULFADIAZINE 10 MG/G
CREAM TOPICAL 2 TIMES DAILY
Qty: 50 G | Refills: 1 | Status: SHIPPED | OUTPATIENT
Start: 2024-10-18

## 2024-10-18 NOTE — TELEPHONE ENCOUNTER
Patient has a painful sore between buttocks for about a month. Requesting something that may help - please send to [facility].  Thank you.

## 2024-10-28 DIAGNOSIS — I69.398 DEPRESSION AS LATE EFFECT OF CEREBROVASCULAR ACCIDENT (CVA): ICD-10-CM

## 2024-10-28 DIAGNOSIS — F06.31 DEPRESSION AS LATE EFFECT OF CEREBROVASCULAR ACCIDENT (CVA): ICD-10-CM

## 2024-10-28 DIAGNOSIS — E78.2 MIXED HYPERLIPIDEMIA: ICD-10-CM

## 2024-10-28 RX ORDER — ATORVASTATIN CALCIUM 80 MG/1
80 TABLET, FILM COATED ORAL DAILY
Qty: 90 TABLET | Refills: 1 | Status: SHIPPED | OUTPATIENT
Start: 2024-10-28

## 2024-10-28 RX ORDER — ESCITALOPRAM OXALATE 10 MG/1
10 TABLET ORAL DAILY
Qty: 90 TABLET | Refills: 1 | Status: SHIPPED | OUTPATIENT
Start: 2024-10-28

## 2024-11-21 DIAGNOSIS — N39.41 URGENCY INCONTINENCE: Primary | ICD-10-CM

## 2024-11-21 RX ORDER — OXYBUTYNIN CHLORIDE 5 MG/1
5 TABLET ORAL 3 TIMES DAILY
Qty: 45 TABLET | Refills: 0 | Status: SHIPPED | OUTPATIENT
Start: 2024-11-21

## 2024-11-21 NOTE — TELEPHONE ENCOUNTER
Joana is with patient and called stating that her prescription for Oxybutynin will not be filled in time through the mail order pharmacy. The pharmacy suggested that they call PCP and ask for a partial one time dose please be called into [facility]. Please review and advise Patient or Joana that medication was sent.

## 2025-01-31 DIAGNOSIS — Z86.73 H/O ISCHEMIC LEFT MCA STROKE: ICD-10-CM

## 2025-01-31 RX ORDER — LOSARTAN POTASSIUM 50 MG/1
50 TABLET ORAL DAILY
Qty: 90 TABLET | Refills: 1 | Status: SHIPPED | OUTPATIENT
Start: 2025-01-31 | End: 2025-02-03 | Stop reason: SDUPTHER

## 2025-02-03 DIAGNOSIS — Z86.73 H/O ISCHEMIC LEFT MCA STROKE: ICD-10-CM

## 2025-02-03 RX ORDER — LOSARTAN POTASSIUM 50 MG/1
50 TABLET ORAL DAILY
Qty: 90 TABLET | Refills: 1 | Status: SHIPPED | OUTPATIENT
Start: 2025-02-03

## 2025-02-21 DIAGNOSIS — I11.0 HYPERTENSIVE HEART DISEASE WITH CONGESTIVE HEART FAILURE, UNSPECIFIED HEART FAILURE TYPE (HCC): ICD-10-CM

## 2025-02-21 RX ORDER — POTASSIUM CHLORIDE 1500 MG/1
20 TABLET, EXTENDED RELEASE ORAL DAILY
Qty: 90 TABLET | Refills: 3 | Status: SHIPPED | OUTPATIENT
Start: 2025-02-21

## 2025-03-18 DIAGNOSIS — E78.2 MIXED HYPERLIPIDEMIA: ICD-10-CM

## 2025-03-18 DIAGNOSIS — I69.398 DEPRESSION AS LATE EFFECT OF CEREBROVASCULAR ACCIDENT (CVA): ICD-10-CM

## 2025-03-18 DIAGNOSIS — F06.31 DEPRESSION AS LATE EFFECT OF CEREBROVASCULAR ACCIDENT (CVA): ICD-10-CM

## 2025-03-18 DIAGNOSIS — Z86.73 H/O ISCHEMIC LEFT MCA STROKE: ICD-10-CM

## 2025-03-18 DIAGNOSIS — I11.0 HYPERTENSIVE HEART DISEASE WITH CONGESTIVE HEART FAILURE, UNSPECIFIED HEART FAILURE TYPE (HCC): ICD-10-CM

## 2025-03-18 DIAGNOSIS — N39.41 URGENCY INCONTINENCE: ICD-10-CM

## 2025-03-19 RX ORDER — LOSARTAN POTASSIUM 50 MG/1
50 TABLET ORAL DAILY
Qty: 30 TABLET | Refills: 0 | Status: SHIPPED | OUTPATIENT
Start: 2025-03-19

## 2025-03-19 RX ORDER — ESCITALOPRAM OXALATE 10 MG/1
10 TABLET ORAL DAILY
Qty: 30 TABLET | Refills: 0 | Status: SHIPPED | OUTPATIENT
Start: 2025-03-19

## 2025-03-19 RX ORDER — ATORVASTATIN CALCIUM 80 MG/1
80 TABLET, FILM COATED ORAL DAILY
Qty: 30 TABLET | Refills: 0 | Status: SHIPPED | OUTPATIENT
Start: 2025-03-19

## 2025-03-19 RX ORDER — OXYBUTYNIN CHLORIDE 5 MG/1
5 TABLET ORAL 3 TIMES DAILY
Qty: 90 TABLET | Refills: 0 | Status: SHIPPED | OUTPATIENT
Start: 2025-03-19

## 2025-03-19 NOTE — TELEPHONE ENCOUNTER
Patient needs an appointment. Please contact the patient to schedule an appointment. Last office visit: 8/27/24

## 2025-03-20 ENCOUNTER — TELEPHONE (OUTPATIENT)
Age: 70
End: 2025-03-20

## 2025-03-20 RX ORDER — POTASSIUM CHLORIDE 1500 MG/1
20 TABLET, EXTENDED RELEASE ORAL DAILY
Qty: 90 TABLET | Refills: 0 | Status: SHIPPED | OUTPATIENT
Start: 2025-03-20

## 2025-03-20 NOTE — TELEPHONE ENCOUNTER
Simón Miramontes called to schedule patient an appointment for a check up.    Pt scheduled for 3/26/25 at 1200 noon.    Pt will bring along insurance cards.

## 2025-04-01 ENCOUNTER — OFFICE VISIT (OUTPATIENT)
Dept: FAMILY MEDICINE CLINIC | Facility: CLINIC | Age: 70
End: 2025-04-01
Payer: COMMERCIAL

## 2025-04-01 VITALS
DIASTOLIC BLOOD PRESSURE: 84 MMHG | SYSTOLIC BLOOD PRESSURE: 130 MMHG | HEART RATE: 84 BPM | TEMPERATURE: 98.6 F | RESPIRATION RATE: 20 BRPM

## 2025-04-01 DIAGNOSIS — F06.31 DEPRESSION AS LATE EFFECT OF CEREBROVASCULAR ACCIDENT (CVA): ICD-10-CM

## 2025-04-01 DIAGNOSIS — I69.398 DEPRESSION AS LATE EFFECT OF CEREBROVASCULAR ACCIDENT (CVA): ICD-10-CM

## 2025-04-01 DIAGNOSIS — Z86.73 H/O ISCHEMIC LEFT MCA STROKE: Primary | ICD-10-CM

## 2025-04-01 DIAGNOSIS — E78.2 MIXED HYPERLIPIDEMIA: ICD-10-CM

## 2025-04-01 DIAGNOSIS — N39.41 URGENCY INCONTINENCE: ICD-10-CM

## 2025-04-01 DIAGNOSIS — E55.9 VITAMIN D DEFICIENCY: ICD-10-CM

## 2025-04-01 DIAGNOSIS — I10 PRIMARY HYPERTENSION: ICD-10-CM

## 2025-04-01 DIAGNOSIS — I69.351 SPASTIC HEMIPLEGIA OF RIGHT DOMINANT SIDE AS LATE EFFECT OF CEREBRAL INFARCTION (HCC): ICD-10-CM

## 2025-04-01 PROBLEM — I95.9 HYPOTENSION: Status: RESOLVED | Noted: 2019-02-25 | Resolved: 2025-04-01

## 2025-04-01 PROBLEM — S72.142A INTERTROCHANTERIC FRACTURE OF LEFT FEMUR (HCC): Status: RESOLVED | Noted: 2017-01-14 | Resolved: 2025-04-01

## 2025-04-01 PROBLEM — R79.1 ELEVATED INTERNATIONAL NORMALIZED RATIO (INR): Status: RESOLVED | Noted: 2017-01-14 | Resolved: 2025-04-01

## 2025-04-01 PROBLEM — R07.9 CHEST PAIN AT REST: Status: RESOLVED | Noted: 2019-02-23 | Resolved: 2025-04-01

## 2025-04-01 PROBLEM — R10.13 EPIGASTRIC PAIN: Status: RESOLVED | Noted: 2019-02-25 | Resolved: 2025-04-01

## 2025-04-01 PROBLEM — F11.20 CONTINUOUS OPIOID DEPENDENCE (HCC): Status: RESOLVED | Noted: 2022-09-19 | Resolved: 2025-04-01

## 2025-04-01 PROBLEM — I11.0 HYPERTENSIVE HEART DISEASE WITH CONGESTIVE HEART FAILURE (HCC): Status: RESOLVED | Noted: 2020-06-12 | Resolved: 2025-04-01

## 2025-04-01 PROBLEM — S27.321A LEFT PULMONARY CONTUSION: Status: RESOLVED | Noted: 2017-01-14 | Resolved: 2025-04-01

## 2025-04-01 PROCEDURE — 99214 OFFICE O/P EST MOD 30 MIN: CPT | Performed by: FAMILY MEDICINE

## 2025-04-01 RX ORDER — ATORVASTATIN CALCIUM 80 MG/1
80 TABLET, FILM COATED ORAL DAILY
Qty: 90 TABLET | Refills: 1 | Status: SHIPPED | OUTPATIENT
Start: 2025-04-01

## 2025-04-01 RX ORDER — ESCITALOPRAM OXALATE 10 MG/1
10 TABLET ORAL DAILY
Qty: 90 TABLET | Refills: 1 | Status: SHIPPED | OUTPATIENT
Start: 2025-04-01

## 2025-04-01 RX ORDER — OXYBUTYNIN CHLORIDE 5 MG/1
5 TABLET ORAL 3 TIMES DAILY
Qty: 90 TABLET | Refills: 5 | Status: SHIPPED | OUTPATIENT
Start: 2025-04-01

## 2025-04-01 RX ORDER — LOSARTAN POTASSIUM 50 MG/1
50 TABLET ORAL DAILY
Qty: 90 TABLET | Refills: 1 | Status: SHIPPED | OUTPATIENT
Start: 2025-04-01

## 2025-04-01 NOTE — PROGRESS NOTES
Name: Mariaelena Duarte      : 1955      MRN: [id number]  Encounter Provider: Edilson Adame DO  Encounter Date: 2025   Encounter department: [facility] PRIMARY CARE  :  Assessment & Plan  H/O ischemic left MCA stroke    Orders:    losartan (COZAAR) 50 mg tablet; Take 1 tablet (50 mg total) by mouth daily    Spastic hemiplegia of right dominant side as late effect of cerebral infarction (HCC)  The patient is unable to get out of her chair without assistance.       Primary hypertension    Orders:    CBC and differential; Future    Comprehensive metabolic panel; Future    Mixed hyperlipidemia    Orders:    atorvastatin (LIPITOR) 80 mg tablet; Take 1 tablet (80 mg total) by mouth daily    Lipid Panel with Direct LDL reflex; Future    Depression as late effect of cerebrovascular accident (CVA)      Orders:    escitalopram (LEXAPRO) 10 mg tablet; Take 1 tablet (10 mg total) by mouth daily    Urgency incontinence    Orders:    oxybutynin (DITROPAN) 5 mg tablet; Take 1 tablet (5 mg total) by mouth 3 (three) times a day    Vitamin D deficiency    Orders:    Vitamin D 25 hydroxy; Future          Depression Screening and Follow-up Plan: Patient was screened for depression during today's encounter. They screened negative with a PHQ-2 score of 0.        History of Present Illness   Patient presents for overdue checkup has a history of stroke with right-sided Herson deficit and aphasia      Review of Systems   Constitutional:  Negative for chills and fever.   HENT:  Negative for ear pain and sore throat.    Eyes:  Negative for pain and visual disturbance.   Respiratory:  Negative for cough and shortness of breath.    Cardiovascular:  Negative for chest pain and palpitations.   Gastrointestinal:  Negative for abdominal pain and vomiting.   Genitourinary:  Negative for dysuria and hematuria.   Musculoskeletal:  Negative for arthralgias and back pain.   Skin:  Negative for color change and rash.    Neurological:  Positive for speech difficulty and weakness. Negative for seizures and syncope.        Right-sided Herson deficit with aphasia   All other systems reviewed and are negative.      Objective   /84   Pulse 84   Temp 98.6 °F (37 °C)   Resp 20   LMP  (LMP Unknown)      Physical Exam  Constitutional:       Appearance: Normal appearance.   HENT:      Head: Normocephalic and atraumatic.      Right Ear: Tympanic membrane, ear canal and external ear normal.      Left Ear: Tympanic membrane, ear canal and external ear normal.      Nose: Nose normal.      Mouth/Throat:      Mouth: Mucous membranes are moist.      Pharynx: Oropharynx is clear.   Eyes:      Extraocular Movements: Extraocular movements intact.      Conjunctiva/sclera: Conjunctivae normal.      Pupils: Pupils are equal, round, and reactive to light.   Cardiovascular:      Rate and Rhythm: Normal rate and regular rhythm.      Pulses: Normal pulses.      Heart sounds: Normal heart sounds.   Pulmonary:      Effort: Pulmonary effort is normal.      Breath sounds: Normal breath sounds.   Abdominal:      General: Abdomen is flat. Bowel sounds are normal.      Palpations: Abdomen is soft.   Musculoskeletal:         General: Normal range of motion.      Cervical back: Normal range of motion and neck supple.      Comments: Wheelchair-bound with right-sided Herson deficit   Skin:     General: Skin is warm and dry.   Neurological:      General: No focal deficit present.      Mental Status: She is alert and oriented to person, place, and time. Mental status is at baseline.      Comments: Aphasia   Psychiatric:         Mood and Affect: Mood normal.         Behavior: Behavior normal.

## 2025-04-22 DIAGNOSIS — M19.90 ARTHRITIS: ICD-10-CM

## 2025-04-22 RX ORDER — IBUPROFEN 600 MG/1
600 TABLET, FILM COATED ORAL EVERY 8 HOURS PRN
Qty: 270 TABLET | Refills: 2 | Status: SHIPPED | OUTPATIENT
Start: 2025-04-22

## 2025-04-22 NOTE — TELEPHONE ENCOUNTER
Reason for call:   [x] Refill   [] Prior Auth  [] Other:     Office:   [x] PCP/Provider -   [] Specialty/Provider -     Medication: ibuprofen 600 mg, take 1 tablet every 8 hours as needed     [facility]   Does the patient have enough for 3 days?   [] Yes   [x] No - Send as HP to POD    [facility] Pharmacy   Does the patient have enough for 10 days?   [] Yes   [] No - Send as HP to POD

## 2025-06-25 DIAGNOSIS — R21 RASH: ICD-10-CM

## 2025-06-25 RX ORDER — SILVER SULFADIAZINE 10 MG/G
CREAM TOPICAL 2 TIMES DAILY
Qty: 25 G | Refills: 0 | Status: SHIPPED | OUTPATIENT
Start: 2025-06-25

## 2025-06-25 NOTE — TELEPHONE ENCOUNTER
Reason for call:   [x] Refill   [] Prior Auth  [] Other:     Office:   [x] PCP/Provider -   [] Specialty/Provider -     Medication: silver sulfadiazine (SILVADENE,SSD) 1 % cream     Dose/Frequency: Apply topically 2 (two) times a day     Quantity: 50 g    Pharmacy:   [facility] - [facility] [place] - [address] [facility]   Does the patient have enough for 3 days?   [] Yes   [x] No - Send as HP to POD    [facility] Pharmacy   Does the patient have enough for 10 days?   [] Yes   [] No - Send as HP to POD

## 2025-07-25 DIAGNOSIS — I11.0 HYPERTENSIVE HEART DISEASE WITH CONGESTIVE HEART FAILURE, UNSPECIFIED HEART FAILURE TYPE (HCC): ICD-10-CM

## 2025-07-25 RX ORDER — POTASSIUM CHLORIDE 1500 MG/1
20 TABLET, EXTENDED RELEASE ORAL DAILY
Qty: 90 TABLET | Refills: 1 | Status: SHIPPED | OUTPATIENT
Start: 2025-07-25

## (undated) DEVICE — ALL PURPOSE SPONGES,NONWOVEN, 4 PLY: Brand: CURITY

## (undated) DEVICE — MEDI-VAC YANKAUER SUCTION HANDLE W/STRAIGHT TIP & CONTROL VENT: Brand: CARDINAL HEALTH

## (undated) DEVICE — DRAPE SURGIKIT SADDLE BAG

## (undated) DEVICE — SUT VICRYL PLUS 1 CTB-1 36 IN VCPB947H

## (undated) DEVICE — GLOVE SRG BIOGEL 8

## (undated) DEVICE — PAD CAST 4 IN COTTON NON STERILE

## (undated) DEVICE — INTENDED FOR TISSUE SEPARATION, AND OTHER PROCEDURES THAT REQUIRE A SHARP SURGICAL BLADE TO PUNCTURE OR CUT.: Brand: BARD-PARKER SAFETY BLADES SIZE 10, STERILE

## (undated) DEVICE — GAUZE SPONGES,16 PLY: Brand: CURITY

## (undated) DEVICE — BETADINE SURGICAL SCRUB 32OZ

## (undated) DEVICE — GLOVE INDICATOR PI UNDERGLOVE SZ 8.5 BLUE

## (undated) DEVICE — CURITY NON-ADHERENT STRIPS: Brand: CURITY

## (undated) DEVICE — SPONGE PVP SCRUB WING STERILE

## (undated) DEVICE — 2.5MM REAMING ROD WITH BALL TIP/950MM-STERILE

## (undated) DEVICE — INTENDED FOR TISSUE SEPARATION, AND OTHER PROCEDURES THAT REQUIRE A SHARP SURGICAL BLADE TO PUNCTURE OR CUT.: Brand: BARD-PARKER SAFETY BLADES SIZE 15, STERILE

## (undated) DEVICE — 3M™ TEGADERM™ TRANSPARENT FILM DRESSING FRAME STYLE, 1626W, 4 IN X 4-3/4 IN (10 CM X 12 CM), 50/CT 4CT/CASE: Brand: 3M™ TEGADERM™

## (undated) DEVICE — CHLORAPREP HI-LITE 26ML ORANGE

## (undated) DEVICE — DRAPE C-ARMOUR

## (undated) DEVICE — SUT VICRYL PLUS 2-0 CTB-1 27 IN VCPB259H

## (undated) DEVICE — STERILE ORIF HIP PACK: Brand: CARDINAL HEALTH

## (undated) DEVICE — 3.2MM GUIDE WIRE 400MM

## (undated) DEVICE — 6617 IOBAN II PATIENT ISOLATION DRAPE 5/BX,4BX/CS: Brand: STERI-DRAPE™ IOBAN™ 2

## (undated) DEVICE — ABDOMINAL PAD: Brand: DERMACEA

## (undated) DEVICE — HEAVY DUTY TABLE COVER: Brand: CONVERTORS

## (undated) DEVICE — 4.0MM THREE-FLUTED DRILL BIT QC/260MM/65MM CALIBRATION